# Patient Record
Sex: MALE | Race: WHITE | Employment: OTHER | URBAN - METROPOLITAN AREA
[De-identification: names, ages, dates, MRNs, and addresses within clinical notes are randomized per-mention and may not be internally consistent; named-entity substitution may affect disease eponyms.]

---

## 2021-08-04 LAB
CHOLESTEROL, TOTAL: 107 MG/DL
CHOLESTEROL/HDL RATIO: ABNORMAL
HDLC SERPL-MCNC: 25 MG/DL (ref 35–70)
LDL CHOLESTEROL CALCULATED: 50 MG/DL (ref 0–160)
NONHDLC SERPL-MCNC: 82 MG/DL
TRIGL SERPL-MCNC: 159 MG/DL
VLDLC SERPL CALC-MCNC: ABNORMAL MG/DL

## 2022-01-24 ENCOUNTER — OFFICE VISIT (OUTPATIENT)
Dept: PRIMARY CARE CLINIC | Age: 72
End: 2022-01-24
Payer: COMMERCIAL

## 2022-01-24 VITALS
BODY MASS INDEX: 27.74 KG/M2 | TEMPERATURE: 97.9 F | OXYGEN SATURATION: 95 % | HEIGHT: 78 IN | DIASTOLIC BLOOD PRESSURE: 62 MMHG | RESPIRATION RATE: 18 BRPM | WEIGHT: 239.8 LBS | SYSTOLIC BLOOD PRESSURE: 98 MMHG | HEART RATE: 116 BPM

## 2022-01-24 DIAGNOSIS — M25.511 ACUTE PAIN OF BOTH SHOULDERS: Primary | ICD-10-CM

## 2022-01-24 DIAGNOSIS — E11.9 TYPE 2 DIABETES MELLITUS WITHOUT COMPLICATION, WITHOUT LONG-TERM CURRENT USE OF INSULIN (HCC): ICD-10-CM

## 2022-01-24 DIAGNOSIS — M10.022 ACUTE IDIOPATHIC GOUT OF LEFT ELBOW: ICD-10-CM

## 2022-01-24 DIAGNOSIS — M25.512 ACUTE PAIN OF BOTH SHOULDERS: Primary | ICD-10-CM

## 2022-01-24 PROCEDURE — 36415 COLL VENOUS BLD VENIPUNCTURE: CPT | Performed by: NURSE PRACTITIONER

## 2022-01-24 PROCEDURE — 99203 OFFICE O/P NEW LOW 30 MIN: CPT | Performed by: NURSE PRACTITIONER

## 2022-01-24 RX ORDER — ATORVASTATIN CALCIUM 40 MG/1
40 TABLET, FILM COATED ORAL DAILY
COMMUNITY
Start: 2021-03-15 | End: 2022-02-07 | Stop reason: SDUPTHER

## 2022-01-24 RX ORDER — VIT C/E/ZN/COPPR/LUTEIN/ZEAXAN 60 MG-6 MG
1 CAPSULE ORAL DAILY
COMMUNITY

## 2022-01-24 RX ORDER — METFORMIN HYDROCHLORIDE 500 MG/1
TABLET, EXTENDED RELEASE ORAL
COMMUNITY
Start: 2021-03-15 | End: 2022-02-07 | Stop reason: SDUPTHER

## 2022-01-24 RX ORDER — FERROUS SULFATE 324(65)MG
1 TABLET, DELAYED RELEASE (ENTERIC COATED) ORAL
COMMUNITY

## 2022-01-24 RX ORDER — HYDROCHLOROTHIAZIDE 12.5 MG/1
25 CAPSULE, GELATIN COATED ORAL DAILY
COMMUNITY
Start: 2021-05-19 | End: 2022-02-07 | Stop reason: SDUPTHER

## 2022-01-24 RX ORDER — OMEPRAZOLE 20 MG/1
20 CAPSULE, DELAYED RELEASE ORAL 2 TIMES DAILY
COMMUNITY
Start: 2021-03-15 | End: 2022-02-07 | Stop reason: SDUPTHER

## 2022-01-24 RX ORDER — TAMSULOSIN HYDROCHLORIDE 0.4 MG/1
0.4 CAPSULE ORAL DAILY
COMMUNITY
Start: 2021-03-15 | End: 2022-02-07 | Stop reason: SDUPTHER

## 2022-01-24 RX ORDER — ASPIRIN 81 MG/1
81 TABLET ORAL DAILY
COMMUNITY

## 2022-01-24 RX ORDER — ALLOPURINOL 300 MG/1
300 TABLET ORAL DAILY
COMMUNITY
Start: 2021-10-19 | End: 2022-02-07 | Stop reason: SDUPTHER

## 2022-01-24 SDOH — ECONOMIC STABILITY: FOOD INSECURITY: WITHIN THE PAST 12 MONTHS, YOU WORRIED THAT YOUR FOOD WOULD RUN OUT BEFORE YOU GOT MONEY TO BUY MORE.: NEVER TRUE

## 2022-01-24 SDOH — ECONOMIC STABILITY: FOOD INSECURITY: WITHIN THE PAST 12 MONTHS, THE FOOD YOU BOUGHT JUST DIDN'T LAST AND YOU DIDN'T HAVE MONEY TO GET MORE.: NEVER TRUE

## 2022-01-24 ASSESSMENT — PATIENT HEALTH QUESTIONNAIRE - PHQ9
SUM OF ALL RESPONSES TO PHQ9 QUESTIONS 1 & 2: 0
1. LITTLE INTEREST OR PLEASURE IN DOING THINGS: 0
2. FEELING DOWN, DEPRESSED OR HOPELESS: 0
SUM OF ALL RESPONSES TO PHQ QUESTIONS 1-9: 0

## 2022-01-24 ASSESSMENT — SOCIAL DETERMINANTS OF HEALTH (SDOH): HOW HARD IS IT FOR YOU TO PAY FOR THE VERY BASICS LIKE FOOD, HOUSING, MEDICAL CARE, AND HEATING?: NOT HARD AT ALL

## 2022-01-24 ASSESSMENT — ENCOUNTER SYMPTOMS
WHEEZING: 0
COUGH: 0
SHORTNESS OF BREATH: 0

## 2022-01-24 NOTE — PROGRESS NOTES
PROGRESS NOTE  Date of Service:  1/24/2022  Address: [place] [address] [facility]  [address] [place] [address] [address]  Dept: [phone]  Loc: [phone]    Subjective:      Patient ID: [id number]  Carla Rose is a 66 y.o. 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 Patient is . Patient is having bilateral shoulder pain, patient said that he can get comfortable for a while, he said he will sleep on one then it with hurt him. Patient said it has been going on 18 months. Patient denies injury his shoulders. Patient works on older cars, he said getting out hurts, he said pushing up hurt now pulling hurt. Patient said he has lost a little strength. Patient is taking some aleve, he said he takes when he is trying to help him sleep. Gout: right wrist, patient said he has pain in the left elbow, he was told by ortho. He said he thinks he has gout in ankle. Diabetes: does not check sugars. Patient sugars were checked in september 6.8 a1c    Patient does go to the [place] for his medical care as well, he is getting melanoma excised from his neck, not scheduled. Review of Systems   Constitutional: Negative for chills and fatigue. Respiratory: Negative for cough, shortness of breath and wheezing. Cardiovascular: Negative for chest pain and palpitations. Musculoskeletal:        Bilateral shoulder pain    Psychiatric/Behavioral: The patient is not nervous/anxious. All other systems reviewed and are negative. Objective:   Physical Exam  Vitals reviewed. Constitutional:       Appearance: Normal appearance. Cardiovascular:      Rate and Rhythm: Normal rate and regular rhythm. Pulses: Normal pulses. Heart sounds: Normal heart sounds. Pulmonary:      Effort: Pulmonary effort is normal.      Breath sounds: Normal breath sounds. Abdominal:      General: Abdomen is flat. Palpations: Abdomen is soft. Skin:     Capillary Refill: Capillary refill takes less than 2 seconds. Neurological:      General: No focal deficit present. Mental Status: He is alert and oriented to person, place, and time. Psychiatric:         Mood and Affect: Mood normal.         Behavior: Behavior normal.         Thought Content: Thought content normal.         Judgment: Judgment normal.         Plan:   1. Acute pain of both shoulders-4 polymyalgia rheumatica. Been having pain in both shoulders will get sed rate and CRP if elevated will start patient on steroids discussed in office and get a referral to rheumatology. Patient understands that steroids can affect his blood sugars we will check A1c today. -     Sedimentation Rate  -     C-Reactive Protein  2. Acute idiopathic gout of left elbow-patient has a history of gout has been followed by orthopedic. We will continue to monitor patient will continue allopurinol. Patient's last uric acid was within normal limits. 3. Type 2 diabetes mellitus without complication, without long-term current use of insulin (HCC)-patient's A1c has been well controlled over the years. Patient is on Metformin will continue this with Lipitor.  -     Comprehensive Metabolic Panel  -     Hemoglobin A1C  -     glucose monitoring (FREESTYLE FREEDOM) kit; DAILY Starting Thu 1/27/2022, Disp-1 kit, R-0, Normal             Electronically signed by JOSE Campa CNP on 1/24/22 at 3:12 PM EST     This dictation was generated by voice recognition computer software. Although all attempts are made to edit the dictation for accuracy, there may be errors in the transcription that were not intended.

## 2022-01-25 LAB
A/G RATIO: 1 (ref 1.1–2.2)
ALBUMIN SERPL-MCNC: 3.8 G/DL (ref 3.4–5)
ALP BLD-CCNC: 149 U/L (ref 40–129)
ALT SERPL-CCNC: 22 U/L (ref 10–40)
ANION GAP SERPL CALCULATED.3IONS-SCNC: 14 MMOL/L (ref 3–16)
AST SERPL-CCNC: 21 U/L (ref 15–37)
BILIRUB SERPL-MCNC: 0.5 MG/DL (ref 0–1)
BUN BLDV-MCNC: 19 MG/DL (ref 7–20)
C-REACTIVE PROTEIN: 12.3 MG/L (ref 0–5.1)
CALCIUM SERPL-MCNC: 9.8 MG/DL (ref 8.3–10.6)
CHLORIDE BLD-SCNC: 98 MMOL/L (ref 99–110)
CO2: 22 MMOL/L (ref 21–32)
CREAT SERPL-MCNC: 1.1 MG/DL (ref 0.8–1.3)
ESTIMATED AVERAGE GLUCOSE: 151.3 MG/DL
GFR AFRICAN AMERICAN: >60
GFR NON-AFRICAN AMERICAN: >60
GLUCOSE BLD-MCNC: 117 MG/DL (ref 70–99)
HBA1C MFR BLD: 6.9 %
POTASSIUM SERPL-SCNC: 4.2 MMOL/L (ref 3.5–5.1)
SEDIMENTATION RATE, ERYTHROCYTE: 80 MM/HR (ref 0–20)
SODIUM BLD-SCNC: 134 MMOL/L (ref 136–145)
TOTAL PROTEIN: 7.7 G/DL (ref 6.4–8.2)

## 2022-01-26 DIAGNOSIS — M25.512 ACUTE PAIN OF BOTH SHOULDERS: Primary | ICD-10-CM

## 2022-01-26 DIAGNOSIS — M25.511 ACUTE PAIN OF BOTH SHOULDERS: Primary | ICD-10-CM

## 2022-01-26 DIAGNOSIS — R70.0 ELEVATED SED RATE: Primary | ICD-10-CM

## 2022-01-26 DIAGNOSIS — R70.0 ELEVATED SED RATE: ICD-10-CM

## 2022-01-26 RX ORDER — ALLOPURINOL 300 MG/1
300 TABLET ORAL DAILY
Qty: 30 TABLET | Status: CANCELLED | OUTPATIENT
Start: 2022-01-26

## 2022-01-26 RX ORDER — PREDNISONE 10 MG/1
TABLET ORAL
Qty: 55 TABLET | Refills: 0 | Status: SHIPPED | OUTPATIENT
Start: 2022-01-26 | End: 2022-03-30

## 2022-01-27 RX ORDER — BLOOD-GLUCOSE METER
1 KIT MISCELLANEOUS DAILY
Qty: 1 KIT | Refills: 0 | Status: SHIPPED | OUTPATIENT
Start: 2022-01-27 | End: 2022-07-20

## 2022-01-31 ENCOUNTER — TELEPHONE (OUTPATIENT)
Dept: PRIMARY CARE CLINIC | Age: 72
End: 2022-01-31

## 2022-02-05 ENCOUNTER — PATIENT MESSAGE (OUTPATIENT)
Dept: PRIMARY CARE CLINIC | Age: 72
End: 2022-02-05

## 2022-02-05 DIAGNOSIS — N39.44 NOCTURNAL ENURESIS: ICD-10-CM

## 2022-02-05 DIAGNOSIS — M10.9 GOUT, UNSPECIFIED CAUSE, UNSPECIFIED CHRONICITY, UNSPECIFIED SITE: ICD-10-CM

## 2022-02-05 DIAGNOSIS — E11.9 TYPE 2 DIABETES MELLITUS WITHOUT COMPLICATION, WITHOUT LONG-TERM CURRENT USE OF INSULIN (HCC): Primary | ICD-10-CM

## 2022-02-05 DIAGNOSIS — I10 HYPERTENSION, UNSPECIFIED TYPE: ICD-10-CM

## 2022-02-05 DIAGNOSIS — K21.9 GASTROESOPHAGEAL REFLUX DISEASE, UNSPECIFIED WHETHER ESOPHAGITIS PRESENT: ICD-10-CM

## 2022-02-07 DIAGNOSIS — E11.9 TYPE 2 DIABETES MELLITUS WITHOUT COMPLICATION, WITHOUT LONG-TERM CURRENT USE OF INSULIN (HCC): ICD-10-CM

## 2022-02-07 RX ORDER — ALLOPURINOL 300 MG/1
300 TABLET ORAL DAILY
Qty: 90 TABLET | Refills: 1 | Status: SHIPPED | OUTPATIENT
Start: 2022-02-07 | End: 2022-04-13 | Stop reason: SDUPTHER

## 2022-02-07 RX ORDER — HYDROCHLOROTHIAZIDE 12.5 MG/1
25 CAPSULE, GELATIN COATED ORAL DAILY
Qty: 90 CAPSULE | Refills: 1 | Status: SHIPPED | OUTPATIENT
Start: 2022-02-07 | End: 2022-07-26 | Stop reason: SDUPTHER

## 2022-02-07 RX ORDER — OMEPRAZOLE 20 MG/1
20 CAPSULE, DELAYED RELEASE ORAL 2 TIMES DAILY
Qty: 90 CAPSULE | Refills: 1 | Status: SHIPPED | OUTPATIENT
Start: 2022-02-07 | End: 2022-07-26 | Stop reason: SDUPTHER

## 2022-02-07 RX ORDER — ATORVASTATIN CALCIUM 40 MG/1
40 TABLET, FILM COATED ORAL DAILY
Qty: 90 TABLET | Refills: 1 | Status: SHIPPED | OUTPATIENT
Start: 2022-02-07 | End: 2022-08-16 | Stop reason: SDUPTHER

## 2022-02-07 RX ORDER — BLOOD-GLUCOSE METER
1 KIT MISCELLANEOUS DAILY
Qty: 1 KIT | Refills: 0 | Status: CANCELLED | OUTPATIENT
Start: 2022-02-07

## 2022-02-07 RX ORDER — METFORMIN HYDROCHLORIDE 500 MG/1
TABLET, EXTENDED RELEASE ORAL
Qty: 90 TABLET | Refills: 1 | Status: SHIPPED | OUTPATIENT
Start: 2022-02-07 | End: 2022-04-29 | Stop reason: SDUPTHER

## 2022-02-07 RX ORDER — LANCETS 30 GAUGE
1 EACH MISCELLANEOUS DAILY
Qty: 300 EACH | Refills: 1 | Status: SHIPPED | OUTPATIENT
Start: 2022-02-07 | End: 2022-02-09 | Stop reason: SDUPTHER

## 2022-02-07 RX ORDER — TAMSULOSIN HYDROCHLORIDE 0.4 MG/1
0.4 CAPSULE ORAL DAILY
Qty: 90 CAPSULE | Refills: 1 | Status: SHIPPED | OUTPATIENT
Start: 2022-02-07 | End: 2022-08-16 | Stop reason: SDUPTHER

## 2022-02-07 NOTE — TELEPHONE ENCOUNTER
Spoke to patient, requested all Rx's to [facility] order pharmacy. Orders loaded, please review and also review diagnosis prior to sending to pharmacy. Medication:   Requested Prescriptions     Pending Prescriptions Disp Refills    Blood Glucose Monitoring Suppl KIT 1 kit 0     Si kit by Does not apply route daily Dispense according to insurance formulary    blood glucose test strips (ASCENSIA AUTODISC VI;ONE TOUCH ULTRA TEST VI) strip 300 each 1     Si each by In Vitro route daily Test as directed,Dispense according to insurance formulary    Lancets MISC 300 each 1     Si each by Does not apply route daily Test as directed, Dispense according to insurance formulary    allopurinol (ZYLOPRIM) 300 MG tablet 90 tablet 1     Sig: Take 1 tablet by mouth daily    atorvastatin (LIPITOR) 40 MG tablet 90 tablet 1     Sig: Take 1 tablet by mouth daily    hydroCHLOROthiazide (MICROZIDE) 12.5 MG capsule 90 capsule 1     Sig: Take 2 capsules by mouth daily    metFORMIN (GLUCOPHAGE-XR) 500 MG extended release tablet 90 tablet 1     Sig: TAKE 1 TABLET EVERY NIGHT    omeprazole (PRILOSEC) 20 MG delayed release capsule 90 capsule 1     Sig: Take 1 capsule by mouth 2 times daily    tamsulosin (FLOMAX) 0.4 MG capsule 90 capsule 1     Sig: Take 1 capsule by mouth daily        Last Filled:      Patient Phone Number: [phone] (home)     Last appt: 2022   Next appt: 2022    Last OARRS: No flowsheet data found.

## 2022-02-08 DIAGNOSIS — E11.9 TYPE 2 DIABETES MELLITUS WITHOUT COMPLICATION, WITHOUT LONG-TERM CURRENT USE OF INSULIN (HCC): ICD-10-CM

## 2022-02-09 RX ORDER — LANCETS 30 GAUGE
1 EACH MISCELLANEOUS DAILY
Qty: 300 EACH | Refills: 1 | Status: SHIPPED | OUTPATIENT
Start: 2022-02-09

## 2022-03-26 NOTE — PROGRESS NOTES
Trevin Vasquez MD  [facility] Physicians - Rheumatology    [x] [facility]:  [facility]  [address] [facility] [] [address]:  [address], [address]   Office: [phone]  Fax: [phone]     RHEUMATOLOGY CONSULT NOTE    ASSESSMENT/PLAN:  Daniel Gomez is a 66 y.o. male referred by JOSE Langston - CNP for acute pain of both shoulders and elevated ESR. PMHx includes recent Hx of melanoma (on neck and trunk s/p resection, per pt localized) and T2DM. Current rheum meds:  Aleve PRN  Allopurinol 300 mg daily  Vitamin D3 2000 IU daily    Past rheum meds:  Prednisone taper starting w/ 50 mg daily: prescribed by PCP on 1/26/22, \"took away all my pains\"     1. Polyarthralgia  Assessment & Plan:  - chronic inflammatory polyarthritis involving the b/l MCP, PIP, wrist and likely MTP and ankle joints. He had significant synovitis in his wrist and MCP joints on exam w/ limited ROM in his wrist joints. Clinical presentation not c/w gout, I doubt he had gout. DDx includes RA vs CPPD arthropathy, favor RA given findings of possible rheumatoid nodule on L elbow and lung nodule on prior PET CT.  - obtain rheum w/u to evaluate for inflammatory arthritis, specifically RA vs CPPD arthropathy. - switch Aleve to Naproxen 500 mg BID PRN. - short term f/u, pending Rheum w/u. Orders:  -     C-Reactive Protein; Future  -     Sedimentation Rate; Future  -     Hepatitis B Core Antibody, Total; Future  -     Hepatitis C Antibody; Future  -     Hepatitis B Surface Antigen; Future  -     Cyclic Citrul Peptide Antibody, IgG; Future  -     Rheumatoid Factor; Future  -     REEMA Reflex to Antibody Cascade; Future  -     C3 Complement; Future  -     C4 Complement; Future  -     Vitamin D 25 Hydroxy; Future  -     XR HAND LEFT (MIN 3 VIEWS); Future  -     XR HAND RIGHT (MIN 3 VIEWS); Future  -     XR FOOT RIGHT (MIN 3 VIEWS); Future  -     XR FOOT LEFT (MIN 3 VIEWS);  Future  - naproxen (NAPROSYN) 500 MG tablet; Take 1 tablet by mouth 2 times daily as needed for Pain, Disp-60 tablet, R-1Normal  -     Lysozyme, Serum; Future  -     Angiotensin Converting Enzyme; Future  -     Vitamin D 1,25 Dihydroxy; Future  2. Idiopathic gout, unspecified chronicity, unspecified site  Assessment & Plan:  - pt reported Hx of non-crystal proven gout treated w/ Allopurinol 300 mg daily. Clinical presentation not c/w gout, I doubt he had gout. - most recent uric acid on 8/4/21 not at goal. Would hold off on titrating Allopurinol dose, pending rheum w/u for inflammatory arthritis including X-rays to evaluate for gouty erosions. Orders:  -     C-Reactive Protein; Future  -     Uric Acid; Future  -     Sedimentation Rate; Future  -     XR HAND LEFT (MIN 3 VIEWS); Future  -     XR HAND RIGHT (MIN 3 VIEWS); Future  -     XR FOOT RIGHT (MIN 3 VIEWS); Future  -     XR FOOT LEFT (MIN 3 VIEWS); Future  3. Encounter for therapeutic drug monitoring  -     CBC with Auto Differential; Future  -     Hepatic Function Panel; Future  -     Renal Panel; Future     Return in about 4 weeks (around 4/27/2022) for lab result discussion and treatment plan, medication monitoring. 3/30/2022 10:58 AM      The risks and benefits of my recommendations, as well as other treatment options, benefits and side effects were discussed w/ the pt today. Questions were answered. NOTE: This report is transcribed by using voice recognition software dragon. Every effort is made to ensure accuracy; however, inadvertent computerized transcription errors may be present. Consult note will be sent to the referring provider. Thank you very much for allowing me to participate in this pt's care. Please do not hesitate to contact me if I can be of further assistance. HISTORY OF PRESENT ILLNESS:      Pt reports a 4 yr Hx of polyarthralgias in his b/l MCP joints, wrists, elbows and most recently his ankles. Arthralgias are worst in the morning. ([facility]     GERD (gastroesophageal reflux disease)     Gout     Hyperlipidemia     Osteoarthritis         No past surgical history on file. Social History     Socioeconomic History    Marital status:      Spouse name: Not on file    Number of children: Not on file    Years of education: Not on file    Highest education level: Not on file   Occupational History    Not on file   Tobacco Use    Smoking status: Former Smoker     Packs/day: 1.00     Years: 15.00     Pack years: 15.00     Start date: 1968     Quit date: 2000     Years since quittin.1    Smokeless tobacco: Never Used   Vaping Use    Vaping Use: Never used   Substance and Sexual Activity    Alcohol use: Not Currently    Drug use: Not Currently    Sexual activity: Not Currently   Other Topics Concern    Not on file   Social History Narrative    Not on file     Social Determinants of Health     Financial Resource Strain: Low Risk     Difficulty of Paying Living Expenses: Not hard at all   Food Insecurity: No Food Insecurity    Worried About Running Out of Food in the Last Year: Never true    Lauren of Food in the Last Year: Never true   Transportation Needs:     Lack of Transportation (Medical): Not on file    Lack of Transportation (Non-Medical):  Not on file   Physical Activity:     Days of Exercise per Week: Not on file    Minutes of Exercise per Session: Not on file   Stress:     Feeling of Stress : Not on file   Social Connections:     Frequency of Communication with Friends and Family: Not on file    Frequency of Social Gatherings with Friends and Family: Not on file    Attends Confucianist Services: Not on file    Active Member of Clubs or Organizations: Not on file    Attends Club or Organization Meetings: Not on file    Marital Status: Not on file   Intimate Partner Violence:     Fear of Current or Ex-Partner: Not on file    Emotionally Abused: Not on file    Physically Abused: Not on file   [facility] Sexually Abused: Not on file   Housing Stability:     Unable to Pay for Housing in the Last Year: Not on file    Number of Places Lived in the Last Year: Not on file    Unstable Housing in the Last Year: Not on file        No family history on file.     MEDICATIONS:    Current Outpatient Medications:     naproxen (NAPROSYN) 500 MG tablet, Take 1 tablet by mouth 2 times daily as needed for Pain, Disp: 60 tablet, Rfl: 1    Lancets MISC, 1 each by Does not apply route daily Test as directed, Dispense according to insurance formulary, Disp: 300 each, Rfl: 1    Blood Glucose Monitoring Suppl KIT, 1 kit by Does not apply route daily Dispense according to insurance formulary, Disp: 1 kit, Rfl: 0    blood glucose test strips (ASCENSIA AUTODISC VI;ONE TOUCH ULTRA TEST VI) strip, 1 each by In Vitro route daily Test as directed,Dispense according to insurance formulary, Disp: 300 each, Rfl: 1    allopurinol (ZYLOPRIM) 300 MG tablet, Take 1 tablet by mouth daily, Disp: 90 tablet, Rfl: 1    atorvastatin (LIPITOR) 40 MG tablet, Take 1 tablet by mouth daily, Disp: 90 tablet, Rfl: 1    hydroCHLOROthiazide (MICROZIDE) 12.5 MG capsule, Take 2 capsules by mouth daily, Disp: 90 capsule, Rfl: 1    metFORMIN (GLUCOPHAGE-XR) 500 MG extended release tablet, TAKE 1 TABLET EVERY NIGHT, Disp: 90 tablet, Rfl: 1    omeprazole (PRILOSEC) 20 MG delayed release capsule, Take 1 capsule by mouth 2 times daily, Disp: 90 capsule, Rfl: 1    tamsulosin (FLOMAX) 0.4 MG capsule, Take 1 capsule by mouth daily, Disp: 90 capsule, Rfl: 1    glucose monitoring (FREESTYLE FREEDOM) kit, 1 kit by Does not apply route daily, Disp: 1 kit, Rfl: 0    aspirin 81 MG EC tablet, Take 81 mg by mouth daily, Disp: , Rfl:     Cholecalciferol 50 MCG (2000 UT) TABS, Take 2,000 Units by mouth daily, Disp: , Rfl:     Ferrous Sulfate 324 MG TBEC, Take 1 tablet by mouth daily (with breakfast), Disp: , Rfl:     Krill Oil 300 MG CAPS, Take 300 mg by mouth nightly, Disp: , Rfl:     Multiple Vitamins-Minerals (OCUVITE-LUTEIN) CAPS multivitamin, Take 1 tablet by mouth daily, Disp: , Rfl:     ALLERGIES:  Patient has no known allergies. PHYSICAL EXAM:    Vitals:    /76   Pulse 90   Ht 6' 3\" (1.905 m)   Wt 241 lb (109.3 kg)   SpO2 96%   BMI 30.12 kg/m²     GEN: AAOx3, in NAD, well-appearing  HEAD: normocephalic, atraumatic  EYES: no injection or icterus  CVS: RRR  LUNGS: in no acute respiratory distress, CTAB  MSK:  Spine: no paraspinal muscle or vertebral tenderness, SI joints NTTP  Upper extremities:   Hands: b/l MCP joints w/ synovial proliferation and synovitis TTP, b/l PIP joints w/ bony enlargement and synovitis TTP, b/l DIP joints w/o significant swelling slightly TTP, full fist formation w/ weak  strength, no evidence of sclerodactyly, calcinosis, digital ulcers or pitting   Wrist: b/l wrist joints w/ synovial proliferation and significant synovitis TTP, limited ROM   Elbow: no synovitis or bursitis, small soft tissue nodule in lateral aspect of the L elbow, FROM   Shoulders: no pain or swelling or warmth on palpation, FROM  Lower extremities:   Knees: no warmth or effusion present, FROM   Ankles: no synovitis, FROM, Achilles tendons w/o swelling or warmth NTTP   Feet: no toe swelling or pain or warmth on palpation w/ FROM, +MTP squeeze test b/l  INTEGUMENT: no rash or psoriatic lesions, no petechiae, bruises, or palpable purpura, no patchy alopecia, no nail pitting or periungual changes, no clubbing or digital ulcers    Labs:   I personally reviewed prior labs including:    Lab Results   Component Value Date     (L) 01/24/2022    K 4.2 01/24/2022    CL 98 (L) 01/24/2022    CO2 22 01/24/2022    BUN 19 01/24/2022    CREATININE 1.1 01/24/2022    GLUCOSE 117 (H) 01/24/2022    CALCIUM 9.8 01/24/2022    PROT 7.7 01/24/2022    LABALBU 3.8 01/24/2022    BILITOT 0.5 01/24/2022    ALKPHOS 149 (H) 01/24/2022    AST 21 01/24/2022    ALT 22 01/24/2022    LABGLOM >60 01/24/2022    GFRAA >60 01/24/2022    AGRATIO 1.0 (L) 01/24/2022     Lab Results   Component Value Date    CRP 12.3 (H) 01/24/2022     Lab Results   Component Value Date    SEDRATE 80 (H) 01/24/2022     Urci acid 7.2 (3/25/20) --> 7.9 (8/7/20) --> 5.5 (2/4/21) --> 6.1 (8/4/21)    Radiology:  I personally reviewed prior imaging including:    PET CT (4/5/21): FINDINGS:   HEAD/NECK:   There is a focal hypermetabolic (maximum SUV 98.7) soft tissue lesion measuring 3 x 1.8 cm (series 3 image 72) surrounding the left aspect of the hyoid bone and extending into the left thyroid cartilage and pre-epiglottic fat. There are multiple small hypermetabolic lymph nodes in the bilateral lower neck. CHEST:   There are mildly enlarged bilateral axillary lymph nodes with preserved fatty fransisco, with the left axillary lymph node measuring a maximum SUV of 5.3. Moderate upper lung zone predominant centrilobular and paraseptal emphysema. Bandlike opacities at the lung bases, likely subsegmental atelectasis/scarring. No abnormal FDG uptake in the lungs, including in the 1.4 cm left upper lobe nodule. Mild coronary artery calcifications. ABDOMEN/PELVIS:   Mild cortical renal scarring. Scattered atherosclerotic calcifications throughout the aorta and its branches. Prostatomegaly. There are mildly hypermetabolic bilateral inguinal lymph nodes with preserved fatty fransisco. BONES/SOFT TISSUES:   There is symmetric increased FDG uptake in the bilateral glenohumeral joints, elbow joints, wrists, and hips, and at C1/dens, likely related to arthritis. The well circumscribed lucent lesion in the right humeral head does not demonstrate increased FDG uptake and is likely a benign lesion. No abnormal FDG uptake in the skeleton. IMPRESSION:   1. 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    2.  Multiple small lymph nodes in the lower neck and mildly enlarged bilateral axillary lymph nodes are indeterminate. While they demonstrate increased FDG uptake, no suspicious morphological features are seen and they may be reactive or inflammatory. Recommend continued attention on follow-up or comparison with outside prior imaging. 3.  No abnormal FDG uptake in the lungs, including in the left upper lobe nodule. Consider continued chest CT follow up imaging. 4.  Polyarticular arthritis with FDG uptake, which may be inflammatory. Above results were discussed w/ the pt in detail during today's visit.

## 2022-03-30 ENCOUNTER — HOSPITAL ENCOUNTER (OUTPATIENT)
Dept: GENERAL RADIOLOGY | Age: 72
Discharge: HOME | End: 2022-03-30
Payer: COMMERCIAL

## 2022-03-30 ENCOUNTER — OFFICE VISIT (OUTPATIENT)
Dept: RHEUMATOLOGY | Age: 72
End: 2022-03-30
Payer: COMMERCIAL

## 2022-03-30 VITALS
DIASTOLIC BLOOD PRESSURE: 76 MMHG | BODY MASS INDEX: 29.97 KG/M2 | OXYGEN SATURATION: 96 % | SYSTOLIC BLOOD PRESSURE: 116 MMHG | HEIGHT: 75 IN | HEART RATE: 90 BPM | WEIGHT: 241 LBS

## 2022-03-30 DIAGNOSIS — M25.50 POLYARTHRALGIA: ICD-10-CM

## 2022-03-30 DIAGNOSIS — M10.00 IDIOPATHIC GOUT, UNSPECIFIED CHRONICITY, UNSPECIFIED SITE: ICD-10-CM

## 2022-03-30 DIAGNOSIS — M25.50 POLYARTHRALGIA: Primary | ICD-10-CM

## 2022-03-30 DIAGNOSIS — Z51.81 ENCOUNTER FOR THERAPEUTIC DRUG MONITORING: ICD-10-CM

## 2022-03-30 LAB
ALBUMIN SERPL-MCNC: 3.8 G/DL (ref 3.4–5)
ALP BLD-CCNC: 153 U/L (ref 40–129)
ALT SERPL-CCNC: 18 U/L (ref 10–40)
ANION GAP SERPL CALCULATED.3IONS-SCNC: 14 MMOL/L (ref 3–16)
AST SERPL-CCNC: 17 U/L (ref 15–37)
BASOPHILS ABSOLUTE: 0 K/UL (ref 0–0.2)
BASOPHILS RELATIVE PERCENT: 0.5 %
BILIRUB SERPL-MCNC: 0.6 MG/DL (ref 0–1)
BILIRUBIN DIRECT: <0.2 MG/DL (ref 0–0.3)
BILIRUBIN, INDIRECT: ABNORMAL MG/DL (ref 0–1)
BUN BLDV-MCNC: 16 MG/DL (ref 7–20)
C-REACTIVE PROTEIN: <3 MG/L (ref 0–5.1)
C3 COMPLEMENT: 148.2 MG/DL (ref 90–180)
C4 COMPLEMENT: 33.2 MG/DL (ref 10–40)
CALCIUM SERPL-MCNC: 9.2 MG/DL (ref 8.3–10.6)
CHLORIDE BLD-SCNC: 102 MMOL/L (ref 99–110)
CO2: 23 MMOL/L (ref 21–32)
CREAT SERPL-MCNC: 1.2 MG/DL (ref 0.8–1.3)
EOSINOPHILS ABSOLUTE: 0.4 K/UL (ref 0–0.6)
EOSINOPHILS RELATIVE PERCENT: 5.6 %
GFR AFRICAN AMERICAN: >60
GFR NON-AFRICAN AMERICAN: 59
GLUCOSE BLD-MCNC: 126 MG/DL (ref 70–99)
HCT VFR BLD CALC: 40 % (ref 40.5–52.5)
HEMOGLOBIN: 13.7 G/DL (ref 13.5–17.5)
HEPATITIS B SURFACE ANTIGEN INTERPRETATION: NORMAL
HEPATITIS C ANTIBODY INTERPRETATION: NORMAL
LYMPHOCYTES ABSOLUTE: 1.8 K/UL (ref 1–5.1)
LYMPHOCYTES RELATIVE PERCENT: 25.8 %
MCH RBC QN AUTO: 31 PG (ref 26–34)
MCHC RBC AUTO-ENTMCNC: 34.2 G/DL (ref 31–36)
MCV RBC AUTO: 90.6 FL (ref 80–100)
MONOCYTES ABSOLUTE: 0.6 K/UL (ref 0–1.3)
MONOCYTES RELATIVE PERCENT: 9.1 %
NEUTROPHILS ABSOLUTE: 4.1 K/UL (ref 1.7–7.7)
NEUTROPHILS RELATIVE PERCENT: 59 %
PDW BLD-RTO: 15.8 % (ref 12.4–15.4)
PHOSPHORUS: 3.2 MG/DL (ref 2.5–4.9)
PLATELET # BLD: 168 K/UL (ref 135–450)
PMV BLD AUTO: 8.5 FL (ref 5–10.5)
POTASSIUM SERPL-SCNC: 4.1 MMOL/L (ref 3.5–5.1)
RBC # BLD: 4.42 M/UL (ref 4.2–5.9)
RHEUMATOID FACTOR: 15 IU/ML
SEDIMENTATION RATE, ERYTHROCYTE: 40 MM/HR (ref 0–20)
SODIUM BLD-SCNC: 139 MMOL/L (ref 136–145)
TOTAL PROTEIN: 6.5 G/DL (ref 6.4–8.2)
URIC ACID, SERUM: 6.3 MG/DL (ref 3.5–7.2)
VITAMIN D 25-HYDROXY: 38.5 NG/ML
WBC # BLD: 6.9 K/UL (ref 4–11)

## 2022-03-30 PROCEDURE — 73130 X-RAY EXAM OF HAND: CPT

## 2022-03-30 PROCEDURE — 99204 OFFICE O/P NEW MOD 45 MIN: CPT | Performed by: INTERNAL MEDICINE

## 2022-03-30 PROCEDURE — 73630 X-RAY EXAM OF FOOT: CPT

## 2022-03-30 RX ORDER — NAPROXEN 500 MG/1
500 TABLET ORAL 2 TIMES DAILY PRN
Qty: 60 TABLET | Refills: 1 | Status: SHIPPED
Start: 2022-03-30 | End: 2022-04-13 | Stop reason: SINTOL

## 2022-03-30 NOTE — ASSESSMENT & PLAN NOTE
- chronic inflammatory polyarthritis involving the b/l MCP, PIP, wrist and likely MTP and ankle joints. He had significant synovitis in his wrist and MCP joints on exam w/ limited ROM in his wrist joints. Clinical presentation not c/w gout, I doubt he had gout. DDx includes RA vs CPPD arthropathy, favor RA given findings of possible rheumatoid nodule on L elbow and lung nodule on prior PET CT.  - obtain rheum w/u to evaluate for inflammatory arthritis, specifically RA vs CPPD arthropathy. - switch Aleve to Naproxen 500 mg BID PRN. - short term f/u, pending Rheum w/u.

## 2022-03-30 NOTE — LETTER
[facility]  [address] [address]  Phone: [phone]  Fax: [phone]    Petty Sterling MD    March 30, 2022     Fabiano Mary, APRN - [address] [facility] [address]    Patient: Tena Mott   MR Number: [id number]   YOB: 1950   Date of Visit: 3/30/2022       Dear Fabiano Mary: Thank you for referring Tena Mott to me for evaluation/treatment. Below are the relevant portions of my assessment and plan of care. If you have questions, please do not hesitate to call me. I look forward to following Marilee Pickett along with you.     Sincerely,      Petty Sterling MD

## 2022-03-30 NOTE — ASSESSMENT & PLAN NOTE
- pt reported Hx of non-crystal proven gout treated w/ Allopurinol 300 mg daily. Clinical presentation not c/w gout, I doubt he had gout. - most recent uric acid on 8/4/21 not at goal. Would hold off on titrating Allopurinol dose, pending rheum w/u for inflammatory arthritis including X-rays to evaluate for gouty erosions.

## 2022-03-30 NOTE — PATIENT INSTRUCTIONS
After your visit with Dr. Niyah Vazquez today, please obtain all labs and imaging as ordered prior to your 2 week follow up visit. Please make sure to obtain all X-rays at a [facility]) facility as Dr. Niyah Vazquez personally reviews the films to make the proper diagnosis for you. If you are referred to another doctor, make sure to call the provided number to schedule an appointment for yourself. If you dont hear anything from that doctors office after a few business days, please give our office a call so we help you or reach out to them on your own if you can find their contact information    Please note:   Lab and imaging results will be discussed at follow up appointments (not over the phone or via [address] If you would like a copy of your labs before your follow up appointment, you can call the office and request for them to be mailed to you. Take pictures on your phone! Many manifestations of rheumatic disease are transient. If you take a picture of your bothersome physical finding (rash, swollen joint, ulcer, etc), we will have more information at your next appointment. Prescriptions and refills will be handled at scheduled office visits and enough medicine will be prescribed to last at least until the patients next appointment. Since we expect to fill prescriptions at the office visit, running out may be a signal that it is time to call our office to schedule an appointment. 90-day refills will be provided at [address] [place] [address] as most of our medications are high risk medications that require toxicity monitoring. It is your responsibility to schedule your follow up appointment on time to ensure that you have enough refills in between office visits. Patients with rheumatic disease are more susceptible to a variety of infections, whether or not they are on medicine to suppress their immune system.  Please discuss vaccinations with your primary care doctor,  including but not limited to:  o Influenza vaccination (yearly)  o Pneumococcal vaccinations (Prevnar 13, Pneumovax)  o Shingles vaccination (Shingrix)  o HPV vaccines (SLE patients have a higher rate of HPV infection and precancerous cervical lesions)  o TDaP vaccination  o Hepatitis B vaccination    Patients with rheumatic disease are at a higher risk for a variety of cancers. Please make sure you discuss age appropriate screening with your primary care doctor, including but not limited to:  o Breast, cervical, colon, and prostate cancer screenings  o Yearly full body skin exams with a dermatologist    Patients with rheumatic disease are at a higher risk of osteoporosis and osteopenia (fragile bones, reduced bone density, increased risk for fractures). Please discuss your need for a bone density test (DEXA scan) with your primary care doctor or Dr. Nico Acuna. Knowledge is power. There is a wealth of free patient information available on www. [email or web address]. Some articles require a subscription, so if you are unable to access an article, please let Dr. Nico Acuna know and she can print it or e-mail the article to you. We encourage you to read as much as you can about your diagnosis and the recommended medications.

## 2022-03-31 LAB
ANTI-CENTROMERE B IGG: <0.2 AI (ref 0–0.9)
ANTI-CHROMATIN IGG: <0.2 AI (ref 0–0.9)
ANTI-DSDNA IGG: 3 IU/ML (ref 0–9)
ANTI-JO1 IGG: <0.2 AI (ref 0–0.9)
ANTI-NUCLEAR ANTIBODY (ANA): POSITIVE
ANTI-RIBOSOMAL P IGG: <0.2 AI (ref 0–0.9)
ANTI-RNP IGG: 1.7 AI (ref 0–0.9)
ANTI-SCL70 IGG: 0.2 AI (ref 0–0.9)
ANTI-SMITH IGG: <0.2 AI (ref 0–0.9)
ANTI-SMRNP IGG: <0.2 AI (ref 0–0.9)
ANTI-SS-A IGG: 6 AI (ref 0–0.9)
ANTI-SS-B IGG: <0.2 AI (ref 0–0.9)
CYCLIC CITRULLINATED PEPTIDE ANTIBODY IGG: >300 U/ML (ref 0–2.9)

## 2022-04-01 LAB
ANGIOTENSIN CONVERTING ENZYME: 83 U/L (ref 9–67)
HEPATITIS B CORE TOTAL ANTIBODY: NEGATIVE
VITAMIN D 1,25-DIHYDROXY: 33.6 PG/ML (ref 19.9–79.3)

## 2022-04-01 NOTE — RESULT ENCOUNTER NOTE
He tested strongly positive for rheumatoid arthritis. We had put him on our cancellation list, please offer 8 AM appt next Wednesday, 4/6/22.

## 2022-04-07 LAB
ANA PATTERN: ABNORMAL
ANA TITER: ABNORMAL
ANTINUCLEAR AB INTERPRETIVE COMMENT: ABNORMAL
ANTINUCLEAR ANTIBODY, HEP-2, IGG: DETECTED
CYTOPLASM PATTERN: ABNORMAL
CYTOPLASMIC PATTERN TITER: ABNORMAL

## 2022-04-09 NOTE — PROGRESS NOTES
Robert Duncan MD  [facility] ([facility]) Physicians - Rheumatology    [x] [facility]:  [facility]  [address] [facility] [] [place] [address]:  [address], [address]   Office: [phone]  Fax: [phone]     RHEUMATOLOGY PROGRESS NOTE    ASSESSMENT/PLAN:  Beryle Hamburger is a 66 y.o. male w/ newly diagnosed seropositive RA (RF 15.0, CCP >300) and gout. PMHx includes recent Hx of melanoma (on neck and trunk s/p resection, per pt localized) and T2DM. Current rheum meds:  Naproxen 500 mg BID PRN  Allopurinol 300 mg daily  Vitamin D3 2000 IU daily     Past rheum meds:  Prednisone taper starting w/ 50 mg daily: prescribed by PCP on 1/26/22, \"took away all my pains\"  Aleve PRN    1. Seropositive rheumatoid arthritis of multiple joints (HCC)  Assessment & Plan:  - discussed the disease course of his high titer CCP positive RA which portends severe, progressive disease. He had significant synovitis in his wrist and MCP joints on exam w/ limited ROM in his wrist joints. - start  mg BID and MTX 5 tabs/wk. - start Pred taper starting w/ 20 mg daily  - stop Naproxen/NSAIDs d/t mild NELLY and ineffectiveness. - check baseline CXR for new seropositive RA Dx, need to screen for ILD. Need to f/u on L lung nodule seen on prior PET CT study. Orders:  -     methotrexate (RHEUMATREX) 2.5 MG chemo tablet; Take 5 tablets by mouth once a week, Disp-60 tablet, R-0, DAWNormal  -     folic acid (FOLVITE) 1 MG tablet; Take 1 tablet by mouth daily, Disp-90 tablet, R-0Normal  -     hydroxychloroquine (PLAQUENIL) 200 MG tablet; Take 1 tablet by mouth 2 times daily, Disp-180 tablet, R-0Normal  -     C-Reactive Protein; Future  -     predniSONE (DELTASONE) 10 MG tablet; Take 2 tablets by mouth every morning for 10 days, THEN 1 tablet every morning for 10 days, THEN 0.5 tablets every morning for 10 days. , Disp-35 tablet, R-0Normal  -     XR CHEST STANDARD (2 VW); Future  2.  Idiopathic gout, unspecified chronicity, unspecified site  Assessment & Plan:  - he has radiographic evidence of gouty erosion, specifically at his R 1st MCP and L 1st MTP joints. Pt denied Hx of podagra or kidney stones. No evidence of tophi on exam.  - uric acid not at goal of <6.0.   - he will increase Allopurinol dose from 300 mg to 400 mg daily. Orders:  -     allopurinol (ZYLOPRIM) 100 MG tablet; Take 4 tablets by mouth daily, Disp-360 tablet, R-0Normal  -     C-Reactive Protein; Future  -     Uric Acid; Future  -     predniSONE (DELTASONE) 10 MG tablet; Take 2 tablets by mouth every morning for 10 days, THEN 1 tablet every morning for 10 days, THEN 0.5 tablets every morning for 10 days. , Disp-35 tablet, R-0Normal  3. Nodule of upper lobe of left lung  Assessment & Plan:  - seen on PET CT study from 4/5/21.  - he has known melanoma and newly diagnosed high titer CCP positive RA.  - check CXR. Orders:  -     XR CHEST STANDARD (2 VW); Future  4. High risk medication use  Assessment & Plan:  - the risk, benefits and side effects were fully explained to my best knowledge, including but not limited to skin rash, GI side effects, visual blurring, and the risk of retinal toxicity and need to have yearly eye/retinal exam. I also provided pt a detailed hand out on HCQ information. Pt verbalized understanding.  - d/w pt that MTX increases the risk of infections, birth defects, liver toxicity, rare lung problems, probable malignancy and bone marrow toxicity. Discussed need to check safety labs 4 weeks after starting MTX followed by labs every 3 months once we reach a steady dose. Strongly recommended alcohol abstinence. - the risks, benefits, and alternatives to the use of Prednisone were discussed w/ the pt.  Side effects included, but were not limited development of hypertension, hyperglycemia or diabetes mellitus, peptic ulcer disease, cataracts and glaucoma, osteoporosis and risk for fractures, weight gain, cushingoid appearance, avascular necrosis, mood changes including depression and euphoria, the development of a decreased ability to fight off infections. Rare side effects like AVN are seen with long term use of steroids as also, are complications associated with withdrawing from the drug abruptly. Orders:  -     Hepatic Function Panel; Future  -     CBC with Auto Differential; Future  -     Creatinine; Future     Return in about 8 weeks (around 6/8/2022) for lab result discussion and treatment plan, medication monitoring. TIME SPENT TODAY:  I spent over 40 minutes of face-to-face time with the pt (including taking interval history and performing physical exam, review of medical records, independently interpreting results and communicating results to the pt/family/caregiver, counseling and educating the pt/family/caregiver, implementation of treatment plan, coordination of care, documenting clinical information in the EMR) during today visit. At least 50% of this time was spent in counseling, explanation of diagnosis, planning of further management, and coordination of care. The risks and benefits of my recommendations, as well as other treatment options, benefits and side effects were discussed with the patient today. Questions were answered. NOTE: This report is transcribed by using voice recognition software dragon. Every effort is made to ensure accuracy; however, inadvertent computerized  transcription errors may be present. SUBJECTIVE:  Past medical/surgical history, medications and allergies are reviewed and updated as appropriate. Interval Hx:   Pt reports persistent polyarthralgias in his hands, wrists and ankle joints. He reports swelling of his fingers and wrists and prolonged morning stiffness. He feels Naproxen is no more effective than Aleve. He denies recent gout attacks or kidney stones. He reports compliance w/ Allopurinol 300 mg daily.     ROS:  Constitutional: denies chronic fatigue, fever/chills, night sweats, unintentional weight loss  Integumentary: denies photosensitivity, rash, patchy alopecia, or Sx of Raynaud's phenomenon  Eyes: denies dry eyes, redness or pain, visual disturbance, or floaters  Nose: denies nasal ulcers or recurrent sinusitis  Oral cavity: +chronic dry mouth, denies oral ulcers  Cardiovascular: denies CP, palpitations, Hx of pericardial effusion or pericarditis  Respiratory: denies SOB, cough, hemoptysis, or pleurisy  Gastrointestinal: denies heart burn, dysphagia or esophageal dysmotility, denies change in bowel habits or Sx of IBD  Hematologic/Lymphatic: denies abnormal bruising or bleeding, denies Hx of blood clots, denies swollen LNs  Musculoskeletal:  refer to above HPI   Neurological: denies focal weakness, paresthesias/hyperesthesias or change in sensation, denies Hx of seizure, denies change in gait, balance, or memory    No Known Allergies    Past Medical History:        Diagnosis Date    Anemia     BPH (benign prostatic hyperplasia)     Diabetes mellitus (HCC)     GERD (gastroesophageal reflux disease)     Gout     Hyperlipidemia     Osteoarthritis        Past Surgical History:    No past surgical history on file. Medications:    Current Outpatient Medications   Medication Sig Dispense Refill    allopurinol (ZYLOPRIM) 100 MG tablet Take 4 tablets by mouth daily 360 tablet 0    methotrexate (RHEUMATREX) 2.5 MG chemo tablet Take 5 tablets by mouth once a week 60 tablet 0    folic acid (FOLVITE) 1 MG tablet Take 1 tablet by mouth daily 90 tablet 0    hydroxychloroquine (PLAQUENIL) 200 MG tablet Take 1 tablet by mouth 2 times daily 180 tablet 0    predniSONE (DELTASONE) 10 MG tablet Take 2 tablets by mouth every morning for 10 days, THEN 1 tablet every morning for 10 days, THEN 0.5 tablets every morning for 10 days.  35 tablet 0    Lancets MISC 1 each by Does not apply route daily Test as directed, Dispense according to insurance formulary 300 each 1    Blood Glucose Monitoring Suppl KIT 1 kit by Does not apply route daily Dispense according to insurance formulary 1 kit 0    blood glucose test strips (ASCENSIA AUTODISC VI;ONE TOUCH ULTRA TEST VI) strip 1 each by In Vitro route daily Test as directed,Dispense according to insurance formulary 300 each 1    atorvastatin (LIPITOR) 40 MG tablet Take 1 tablet by mouth daily 90 tablet 1    hydroCHLOROthiazide (MICROZIDE) 12.5 MG capsule Take 2 capsules by mouth daily 90 capsule 1    metFORMIN (GLUCOPHAGE-XR) 500 MG extended release tablet TAKE 1 TABLET EVERY NIGHT 90 tablet 1    omeprazole (PRILOSEC) 20 MG delayed release capsule Take 1 capsule by mouth 2 times daily 90 capsule 1    tamsulosin (FLOMAX) 0.4 MG capsule Take 1 capsule by mouth daily 90 capsule 1    glucose monitoring (FREESTYLE FREEDOM) kit 1 kit by Does not apply route daily 1 kit 0    aspirin 81 MG EC tablet Take 81 mg by mouth daily      Cholecalciferol 50 MCG (2000 UT) TABS Take 2,000 Units by mouth daily      Ferrous Sulfate 324 MG TBEC Take 1 tablet by mouth daily (with breakfast)      Krill Oil 300 MG CAPS Take 300 mg by mouth nightly      Multiple Vitamins-Minerals (OCUVITE-LUTEIN) CAPS multivitamin Take 1 tablet by mouth daily       No current facility-administered medications for this visit.         OBJECTIVE:  Physical Exam:  /78   Pulse 71   Ht 6' 3\" (1.905 m)   Wt 245 lb (111.1 kg)   SpO2 96%   BMI 30.62 kg/m²     GEN: AAOx3, in NAD, well-appearing  HEAD: normocephalic, atraumatic  EYES: no injection or icterus  CVS: RRR  LUNGS: in no acute respiratory distress, CTAB  MSK:  Spine: no paraspinal muscle or vertebral tenderness, SI joints NTTP  Upper extremities:              Hands: b/l MCP joints w/ synovial proliferation and synovitis TTP, b/l PIP joints w/ bony enlargement and synovitis TTP, b/l DIP joints w/o significant swelling slightly TTP, full fist formation w/ weak  strength, no evidence of sclerodactyly, calcinosis, digital ulcers or pitting              Wrist: b/l wrist joints w/ synovial proliferation and synovitis TTP, limited ROM              Elbow: no synovitis or bursitis, small soft tissue nodule in lateral aspect of the L elbow, FROM  Lower extremities:              Knees: no warmth or effusion present, FROM              Ankles: no synovitis, FROM, Achilles tendons w/o swelling or warmth NTTP              Feet: no toe swelling or pain or warmth on palpation w/ FROM, -MTP squeeze test b/l  INTEGUMENT: no tophaceous deposits, no rash or psoriatic lesions, no petechiae, bruises, or palpable purpura, no patchy alopecia, no nail pitting or periungual changes, no clubbing or digital ulcers    DATA:  Labs:   I personally reviewed interval labs and discussed w/ the pt in detail which showed:    Lab Results   Component Value Date    WBC 6.9 03/30/2022    HGB 13.7 03/30/2022    HCT 40.0 (L) 03/30/2022    MCV 90.6 03/30/2022     03/30/2022    LYMPHOPCT 25.8 03/30/2022    RBC 4.42 03/30/2022    MCH 31.0 03/30/2022    MCHC 34.2 03/30/2022    RDW 15.8 (H) 03/30/2022     Lab Results   Component Value Date     03/30/2022    K 4.1 03/30/2022     03/30/2022    CO2 23 03/30/2022    BUN 16 03/30/2022    CREATININE 1.2 03/30/2022    GLUCOSE 126 (H) 03/30/2022    CALCIUM 9.2 03/30/2022    PROT 6.5 03/30/2022    LABALBU 3.8 03/30/2022    BILITOT 0.6 03/30/2022    ALKPHOS 153 (H) 03/30/2022    AST 17 03/30/2022    ALT 18 03/30/2022    LABGLOM 59 (A) 03/30/2022    GFRAA >60 03/30/2022    AGRATIO 1.0 (L) 01/24/2022     Lab Results   Component Value Date    VITD25 38.5 03/30/2022     Vitamin D 1,25-dihydroxy wnl (3/30/22)    Lab Results   Component Value Date    C3 148.2 03/30/2022     Lab Results   Component Value Date    C4 33.2 03/30/2022     Lab Results   Component Value Date    ANTIDSDNAIGG 3 03/30/2022      Lab Results   Component Value Date    LABURIC 6.3 03/30/2022   Urci acid 7.2 (3/25/20) --> 7.9 (8/7/20) --> 5.5 (2/4/21) --> 6.1 (8/4/21)    Lab Results   Component Value Date    CRP <3.0 03/30/2022    CRP 12.3 (H) 01/24/2022     Lab Results   Component Value Date    SEDRATE 40 (H) 03/30/2022    SEDRATE 80 (H) 01/24/2022     No results found for: CKTOTAL     RF 15.0, CCP >300 (3/30/22)  REEMA 1:160 speckled pattern (3/30/22)  Positive RNP and SSA, rest of LADONNA panel negative (3/30/22)  Serum ACE 83 (3/30/22)  Negative hepatitis B surface Ag and core total Ab, hepatitis C Ab (3/30/22)    Imaging:  I personally reviewed interval imaging and discussed w/ the pt in detail which included:    PET CT (4/5/21): FINDINGS:   HEAD/NECK:   There is a focal hypermetabolic (maximum SUV 89.2) soft tissue lesion measuring 3 x 1.8 cm (series 3 image 72) surrounding the left aspect of the hyoid bone and extending into the left thyroid cartilage and pre-epiglottic fat. There are multiple small hypermetabolic lymph nodes in the bilateral lower neck. CHEST:   There are mildly enlarged bilateral axillary lymph nodes with preserved fatty fransisco, with the left axillary lymph node measuring a maximum SUV of 5.3. Moderate upper lung zone predominant centrilobular and paraseptal emphysema. Bandlike opacities at the lung bases, likely subsegmental atelectasis/scarring. No abnormal FDG uptake in the lungs, including in the 1.4 cm left upper lobe nodule. Mild coronary artery calcifications. ABDOMEN/PELVIS:   Mild cortical renal scarring. Scattered atherosclerotic calcifications throughout the aorta and its branches. Prostatomegaly. There are mildly hypermetabolic bilateral inguinal lymph nodes with preserved fatty fransisco. BONES/SOFT TISSUES:   There is symmetric increased FDG uptake in the bilateral glenohumeral joints, elbow joints, wrists, and hips, and at C1/dens, likely related to arthritis. The well circumscribed lucent lesion in the right humeral head does not demonstrate increased FDG uptake and is likely a benign lesion.  No abnormal FDG uptake in the skeleton. IMPRESSION:   1.  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 2.  Multiple small lymph nodes in the lower neck and mildly enlarged bilateral axillary lymph nodes are indeterminate. While they demonstrate increased FDG uptake, no suspicious morphological features are seen and they may be reactive or inflammatory. Recommend continued attention on follow-up or comparison with outside prior imaging. 3.  No abnormal FDG uptake in the lungs, including in the left upper lobe nodule. Consider continued chest CT follow up imaging. 4.  Polyarticular arthritis with FDG uptake, which may be inflammatory. XR Right Hand:  XR HAND RIGHT (MIN 3 VIEWS) 03/30/2022    Narrative  EXAM: XR FOOT RIGHT (MIN 3 VIEWS), XR HAND LEFT (MIN 3 VIEWS), XR FOOT LEFT (MIN 3 VIEWS), XR HAND RIGHT (MIN 3 VIEWS)    INDICATION:  Bilateral foot and hand pain, evaluate for inflammatory arthritis    COMPARISON: None    VIEWS: 3 views each of the right pneumothorax and right and left hand. FINDINGS:    Right foot:  No acute fracture or osseous destruction. There is no soft tissue swelling, joint subluxation, periarticular osteopenia, or erosions. There is mild first MTP osteoarthrosis. Os navicularis is present. Left foot:  There are periarticular erosive changes at the first MTP joint. There is no associated soft tissue swelling. No other acute inflammatory findings identified. Os navicularis is present. Right hand:  Periarticular erosions of the first MCP joint with mild subluxation. Periarticular erosions at the radiocarpal and ulnar carpal joints. Multiple bone cysts within the carpal joints are present. Left hand:  Periarticular erosions at the radiocarpal and intercarpal joints. Multiple bone cysts within the carpal joints are present. Erosive changes at the STT joint.     Impression  Right foot:  1. Mild first MTP osteoarthrosis. 2.  No evidence of inflammatory or arthritis. Left foot:  1. Erosive changes at the first MTP joint can be seen with gout given the common location however other inflammatory arthritides included in the differential.    Right hand:  1. Multifocal periarticular erosions at the first MCP joint and wrist can be seen with crystal deposition such as gout or other inflammatory arthritides. Left hand:  1. Multifocal periarticular erosions in the wrist can be seen with can be seen with crystal deposition such as gout or other inflammatory arthritides. XR Left Hand:  XR HAND LEFT (MIN 3 VIEWS) 03/30/2022    Narrative  EXAM: XR FOOT RIGHT (MIN 3 VIEWS), XR HAND LEFT (MIN 3 VIEWS), XR FOOT LEFT (MIN 3 VIEWS), XR HAND RIGHT (MIN 3 VIEWS)    INDICATION:  Bilateral foot and hand pain, evaluate for inflammatory arthritis    COMPARISON: None    VIEWS: 3 views each of the right pneumothorax and right and left hand. FINDINGS:    Right foot:  No acute fracture or osseous destruction. There is no soft tissue swelling, joint subluxation, periarticular osteopenia, or erosions. There is mild first MTP osteoarthrosis. Os navicularis is present. Left foot:  There are periarticular erosive changes at the first MTP joint. There is no associated soft tissue swelling. No other acute inflammatory findings identified. Os navicularis is present. Right hand:  Periarticular erosions of the first MCP joint with mild subluxation. Periarticular erosions at the radiocarpal and ulnar carpal joints. Multiple bone cysts within the carpal joints are present. Left hand:  Periarticular erosions at the radiocarpal and intercarpal joints. Multiple bone cysts within the carpal joints are present. Erosive changes at the STT joint. Impression  Right foot:  1. Mild first MTP osteoarthrosis. 2.  No evidence of inflammatory or arthritis. Left foot:  1.   Erosive changes at the first MTP joint can be seen with gout given the common location however other inflammatory arthritides included in the differential.    Right hand:  1. Multifocal periarticular erosions at the first MCP joint and wrist can be seen with crystal deposition such as gout or other inflammatory arthritides. Left hand:  1. Multifocal periarticular erosions in the wrist can be seen with can be seen with crystal deposition such as gout or other inflammatory arthritides. I independently reviewed above x-rays, agree with findings of periarticular erosions in the R 1st MCP, b/l carpal and L 1st MTP joints. R 1st MCP erosion w/ punched out appearance suspicious for gout. Above results were discussed w/ the pt in detail during today's visit.

## 2022-04-12 LAB — LYSOZYME: NORMAL

## 2022-04-13 ENCOUNTER — OFFICE VISIT (OUTPATIENT)
Dept: RHEUMATOLOGY | Age: 72
End: 2022-04-13
Payer: COMMERCIAL

## 2022-04-13 ENCOUNTER — HOSPITAL ENCOUNTER (OUTPATIENT)
Dept: GENERAL RADIOLOGY | Age: 72
Discharge: HOME | End: 2022-04-13
Payer: COMMERCIAL

## 2022-04-13 VITALS
BODY MASS INDEX: 30.46 KG/M2 | SYSTOLIC BLOOD PRESSURE: 132 MMHG | OXYGEN SATURATION: 96 % | WEIGHT: 245 LBS | DIASTOLIC BLOOD PRESSURE: 78 MMHG | HEART RATE: 71 BPM | HEIGHT: 75 IN

## 2022-04-13 DIAGNOSIS — M05.79 SEROPOSITIVE RHEUMATOID ARTHRITIS OF MULTIPLE JOINTS (HCC): ICD-10-CM

## 2022-04-13 DIAGNOSIS — R91.1 NODULE OF UPPER LOBE OF LEFT LUNG: ICD-10-CM

## 2022-04-13 DIAGNOSIS — M05.79 SEROPOSITIVE RHEUMATOID ARTHRITIS OF MULTIPLE JOINTS (HCC): Primary | ICD-10-CM

## 2022-04-13 DIAGNOSIS — Z79.899 HIGH RISK MEDICATION USE: ICD-10-CM

## 2022-04-13 DIAGNOSIS — M10.00 IDIOPATHIC GOUT, UNSPECIFIED CHRONICITY, UNSPECIFIED SITE: ICD-10-CM

## 2022-04-13 PROCEDURE — 99215 OFFICE O/P EST HI 40 MIN: CPT | Performed by: INTERNAL MEDICINE

## 2022-04-13 PROCEDURE — 71046 X-RAY EXAM CHEST 2 VIEWS: CPT

## 2022-04-13 RX ORDER — FOLIC ACID 1 MG/1
1 TABLET ORAL DAILY
Qty: 90 TABLET | Refills: 0 | Status: SHIPPED | OUTPATIENT
Start: 2022-04-13 | End: 2022-05-18 | Stop reason: SDUPTHER

## 2022-04-13 RX ORDER — HYDROXYCHLOROQUINE SULFATE 200 MG/1
200 TABLET, FILM COATED ORAL 2 TIMES DAILY
Qty: 180 TABLET | Refills: 0 | Status: SHIPPED | OUTPATIENT
Start: 2022-04-13 | End: 2022-05-18 | Stop reason: SDUPTHER

## 2022-04-13 RX ORDER — NAPROXEN 500 MG/1
500 TABLET ORAL 2 TIMES DAILY PRN
Qty: 60 TABLET | Refills: 1 | Status: CANCELLED | OUTPATIENT
Start: 2022-04-13

## 2022-04-13 RX ORDER — ALLOPURINOL 100 MG/1
400 TABLET ORAL DAILY
Qty: 360 TABLET | Refills: 0 | Status: SHIPPED | OUTPATIENT
Start: 2022-04-13 | End: 2022-05-18 | Stop reason: SDUPTHER

## 2022-04-13 RX ORDER — PREDNISONE 10 MG/1
TABLET ORAL
Qty: 35 TABLET | Refills: 0 | Status: SHIPPED | OUTPATIENT
Start: 2022-04-13 | End: 2022-05-13

## 2022-04-13 NOTE — PATIENT INSTRUCTIONS
Rheumatoid Arthritis     Fast Facts  Rheumatoid arthritis (RA) is the most common type of autoimmune arthritis. It is triggered by a faulty immune system (the bodys defense system) and affects the wrist and small joints of the hand, including the knuckles and the middle joints of the fingers. Treatments have improved greatly and help many of those affected. For most people with RA, early treatment can control joint pain and swelling, and lessen joint damage. Perform low-impact aerobic exercises, such as walking, and exercises to boost muscle strength. This will improve your overall health and reduce pressure on your joints. Studies show that people who receive early treatment for RA feel better sooner and more often, and are more likely to lead an active life. They also are less likely to have the type of joint damage that leads to joint replacement. Seek an expert in arthritis: a rheumatologist. Expertise is vital to make an early diagnosis of RA and to rule out diseases that mimic RA, thus avoiding unneeded tests and treatments. Rheumatologists are experts in RA and can design a customized treatment plan that is best suited for you. People have long feared rheumatoid arthritis (commonly called RA) as one of the most disabling types of arthritis. The good news is that the outlook has greatly improved for many people with newly diagnosed (detected) RA. Of course, RA remains a serious disease, and one that can vary widely in symptoms (what you feel) and outcomes. Even so, treatment advances have made it possible to stop or at least slow the progression (worsening) of joint damage. Rheumatologists now have many new treatments that target the inflammation that RA causes. They also understand better when and how to use treatments to get the best effects. What is rheumatoid arthritis? RA is the most common form of autoimmune arthritis, affecting more than 1.3 million Americans.  Of these, about 75 percent are women. In fact, 1-3 percent of women may get rheumatoid arthritis in their lifetime. The disease most often begins between the fourth and sixth decades of life. However, RA can start at any age. RA is a chronic (long-term) disease that causes pain, stiffness, swelling and limited motion and function of many joints. While RA can affect any joint, the small joints in the hands and feet tend to be involved most often. Inflammation sometimes can affect organs as well, for instance, the eyes or lungs. The stiffness seen in active RA is most often worst in the morning. It may last one to two hours (or even the whole day). Stiffness for a long time in the morning is a clue that you may have RA, since few other arthritic diseases behave this way. For instance, osteoarthritis most often does not cause prolonged morning stiffness. Other signs and symptoms that can occur in RA include: Loss of energy   Low fevers   Loss of appetite   Dry eyes and mouth from a related health problem, Sjogren's syndrome   Firm lumps, called rheumatoid nodules, which grow beneath the skin in places such as the elbow and hands    What causes rheumatoid arthritis? RA is an autoimmune disease. This means that certain cells of the immune system do not work properly and start attacking healthy tissues -- the joints in RA. The cause of RA is not known. Yet, new research is giving us a better idea of what makes the immune system attack the body and create inflammation. In RA, the focus of the inflammation is in the synovium, the tissue that lines the joint. Immune cells release inflammation-causing chemicals. These chemicals can damage cartilage (the tissue that cushions between joints) and bone. Other things likely play a role in RA as well. For instance, genes that affect the immune system may make some people more prone to getting RA. How is rheumatoid arthritis diagnosed?   RA can be hard to detect because it may begin with subtle symptoms, such as achy joints or a little stiffness in the morning. Also, many diseases behave like RA early on. For this reason, if you or your primary care physician thinks you have RA, you should see a rheumatologist. A rheumatologist is a physician with the skill and knowledge to reach a correct diagnosis of RA and to make the most suitable treatment plan. Diagnosis of RA depends on the symptoms and results of a physical exam, such as warmth, swelling and pain in the joints. Some blood tests also can help confirm RA. Telltale signs include: Anemia (a low red blood cell count)   Rheumatoid factor (an antibody, or blood protein, found in about 80 percent of patients with RA in time, but in as few as 30 percent at the start of arthritis)   Antibodies to cyclic citrullinated peptides (pieces of proteins), or anti-CCP for short (found in 60-70 percent of patients with RA)   Elevated erythrocyte sedimentation rate (a blood test that, in most patients with RA, confirms the amount of inflammation in the joints)  X-rays can help in detecting RA, but may not show anything abnormal in early arthritis. Even so, these first X-rays may be useful later to show if the disease is progressing. Often, MRI and ultrasound scanning are done to help  the severity of RA. There is no single test that confirms an RA diagnosis for most patients with this disease. (This is above all true for patients who have had symptoms fewer than six months.) Rather, a doctor makes the diagnosis by looking at the symptoms and results from the physical exam, lab tests and X-rays. How is rheumatoid arthritis treated? Therapy for RA has improved greatly in the past 30 years. Current treatments give most patients good or excellent relief of symptoms and let them keep functioning at, or near, normal levels. With the right medications, many patients can achieve remission -- that is, have no signs of active disease. There is no cure for RA.  The goal of treatment is to lessen your symptoms and poor function. Doctors do this by starting proper medical therapy as soon as possible, before your joints have lasting damage. No single treatment works for all patients. Many people with RA must change their treatment at least once during their lifetime. Good control of RA requires early diagnosis and, at times, aggressive treatment. Thus, patients with a diagnosis of RA should begin their treatment with disease-modifying antirheumatic drugs -- referred to as DMARDs. These drugs not only relieve symptoms but also slow progression of the disease. Often, doctors prescribe DMARDs along with nonsteroidal anti-inflammatory drugs or NSAIDs and/or low-dose corticosteroids, to lower swelling, pain and fever. DMARDs have greatly improved the symptoms, function and quality of life for nearly all patients with RA. Ask your rheumatologist about the need for DMARD therapy and the risks and benefits of these drugs. Common DMARDs include methotrexate (Rheumatrex, Trexall, Otrexup, Rasuvo), leflunomide (Arava), hydroxychloroquine (Plaquenil) and sulfasalazine (Azulfidine). Gold is an older DMARD that is often given as an injection into a muscle (such as Myochrysine), but can also be given as a pill -- auranofin (Ridaura). The antibiotic minocycline (Minocin) also is a DMARD, as well as the immune suppressants azathioprine (Imuran) and cyclosporine (Neoral, Sandimmune, Gengraf). These three drugs and gold are rarely prescribed for RA these days, because other drugs work better or have fewer side effects. Patients with more serious disease may need medications called biologic response modifiers or biologic agents.  They can target the parts of the immune system and the signals that lead to inflammation and joint and tissue damage.  FDA-approved drugs of this type include abatacept (Orencia), adalimumab (Humira), anakinra (Kineret), certolizumab (Cimzia), etanercept (Enbrel), golimumab (Simponi) infliximab (Remicade), rituximab (Rituxan, MabThera) and tocilizumab (Actemra). Most often, patients take these drugs with methotrexate, as the mix of medicines is more helpful. Janus kinase (DEMETRIO) inhibitors are another type of DMARD. People who cannot be treated with methotrexate alone may be prescribed a DEMETRIO inhibitor such as tofacitinib Cinthya Da Silva). The best treatment of RA needs more than medicines alone. Patient education, such as how to cope with RA, also is important. Proper care requires the expertise of a team of providers, including rheumatologists, primary care physicians, and physical and occupational therapists. You will need frequent visits through the year with your rheumatologist. These checkups let your doctor track the course of your disease and check for any side effects of your medications. You likely also will need to repeat blood tests and X-rays or ultrasounds from time to time. Living with rheumatoid arthritis  Research shows that people with RA, mainly those whose disease is not well controlled, have a higher risk for heart disease and stroke. Talk with your doctor about these risks and ways to lower them. It is important to be physically active most of the time, but to sometimes scale back activities when the disease flares. In general, rest is helpful when a joint is inflamed, or when you feel tired. At these times, do gentle range-of-motion exercises, such as stretching. This will keep the joint flexible. When you feel better, do low-impact aerobic exercises, such as walking, and exercises to boost muscle strength. This will improve your overall health and reduce pressure on your joints. A physical or occupational therapist can help you find which types of activities are best for you, and at what level or pace you should do them. Finding that you have a chronic illness is a life-changing event. It can cause worry and sometimes feelings of isolation or depression. Thanks to greatly improved treatments, these feelings tend to decrease with time as energy improves, and pain and stiffness decrease. Discuss these normal feelings with your health care providers. They can provide helpful information and resources. The rheumatologist's role in the treatment of rheumatoid arthritis  RA is a complex disease, but many advances in treatment have occurred recently. Rheumatologists are doctors who are experts in diagnosing and treating arthritis and other diseases of the joints, muscles and bones. Thus, they are best qualified to make a proper diagnosis of RA. They can also advise patients about the best treatment options. Hydroxychloroquine (Plaquenil) instructions: Take one tablet daily for the first week. If well tolerated, then take one tablet twice a day. Hydroxychloroquine     Pronunciation: eros drox misael STONE oh fern   Brand: Plaquenil Sulfate     What is hydroxychloroquine? Hydroxychloroquine is used to treat or prevent malaria, a disease caused by parasites that enter the body through the bite of a mosquito. Malaria is common in areas such as [place], [place], and [place]. Hydroxychloroquine is also used to treat symptoms of rheumatoid arthritis and discoid or systemic lupus erythematosus. Hydroxychloroquine may also be used for purposes not listed in this medication guide. What should I discuss with my health care provider before taking hydroxychloroquine? You should not use this medication if you are allergic to hydroxychloroquine, or if you have a history of vision changes or damage to your retina caused by an anti-malaria medication. Hydroxychloroquine should not be used for long-term treatment in children. To make sure hydroxychloroquine is safe for you, tell your doctor if you have any of these conditions:    psoriasis;    porphyria;    liver disease;    alcoholism; or    glucose-6-phosphate dehydrogenase (G-6-PD) deficiency.   It is not known whether hydroxychloroquine will harm an unborn baby. Tell your doctor if you are pregnant or plan to become pregnant while using this medication. Malaria is more likely to cause death in a pregnant woman. If you are pregnant, talk with your doctor about the risks of traveling to areas where malaria is common. It is not known whether hydroxychloroquine passes into breast milk or if it could harm a nursing baby. Do not use this medication without telling your doctor if you are breast-feeding a baby. How should I take hydroxychloroquine? Take exactly as prescribed by your doctor. Do not take in larger or smaller amounts or for longer than recommended. Follow the directions on your prescription label. Take hydroxychloroquine with a meal or a glass of milk, unless your doctor tells you otherwise. Hydroxychloroquine is sometimes given only once per week. Choose the same day each week to take this medication if you are on a weekly dosing schedule. To prevent malaria: Start taking the medicine 2 weeks before entering an area where malaria is common. Continue taking the medicine regularly during your stay and for at least 8 weeks after you leave the area. To treat malaria: Your doctor may recommend a single dose, or a high starting dose followed by a smaller dose during the last 2 days of treatment. Follow your doctor's instructions. Take this medicine for the full prescribed length of time for malaria. Your symptoms may improve before the infection is completely cleared. In addition to taking hydroxychloroquine, use protective clothing, insect repellents, and mosquito netting around your bed to further prevent mosquito bites that could cause malaria. Contact your doctor as soon as possible if you have been exposed to malaria, or if you have fever or other symptoms of illness during or after a stay in an area where malaria is common.    When treating lupus or arthritis, hydroxychloroquine is usually given daily for several weeks or months. For best results, keep using the medication as directed. Talk with your doctor if your symptoms do not improve after 6 months of treatment. While using hydroxychloroquine, you may need frequent blood tests at your doctor's office. No medication is 100% effective in treating or preventing all types of malaria. For best results, keep using the medication as directed. Talk with your doctor if you have fever, vomiting, or diarrhea during your treatment. Store at room temperature away from moisture, heat, and light. What happens if I miss a dose? Take the missed dose as soon as you remember. Skip the missed dose if it is almost time for your next scheduled dose. Do not take extra medicine to make up the missed dose. What happens if I overdose? Seek emergency medical attention or call the Poison Help line at 1-605.362.5988. An overdose of hydroxychloroquine can be fatal, especially in children. Treatment of a hydroxychloroquine overdose must be started quickly. You may be told to induce vomiting right away (at home, before transport to an emergency room). Ask the poison control center how to induce vomiting in the case of a hydroxychloroquine overdose. Overdose symptoms may include headache, drowsiness, vision changes, slow heart rate, chest pain or heavy feeling, pain spreading to the arm or shoulder, nausea, sweating, seizure (convulsions), shallow breathing, or breathing that stops. What should I avoid while taking hydroxychloroquine? Avoid taking an antacid or Kaopectate (kaolin-pectin) within 4 hours before or after you take hydroxychloroquine. Some antacids can make it harder for your body to absorb hydroxychloroquine. What are the possible side effects of hydroxychloroquine? Some people taking this medication over long periods of time or at high doses have developed irreversible damage to the retina of the eye.  Stop taking hydroxychloroquine and call your doctor at once if you have trouble focusing, if you see light streaks or flashes in your vision, or if you notice any swelling or color changes in your eyes. Get emergency medical help if you have any of these signs of an allergic reaction: hives; difficulty breathing; swelling of your face, lips, tongue, or throat. Call your doctor at once if you have a serious side effect such as:    muscle weakness, twitching, or uncontrolled movement;    loss of balance or coordination;    blurred vision, light sensitivity, seeing halos around lights;    pale skin, easy bruising or bleeding;    confusion, unusual thoughts or behavior; or    seizure (convulsions). Less serious side effects may include:    headache, ringing in your ears, spinning sensation;    nausea, vomiting, stomach pain;    loss of appetite, weight loss;    mood changes, feeling nervous or irritable;    skin rash or itching; or    hair loss.    This is not a complete list of side effects and others may occur. Call your doctor for medical advice about side effects. You may report side effects to FDA at 0-860-FDA-4174. What other drugs will affect hydroxychloroquine? Hydroxychloroquine can harm your liver. This effect is increased when you also use other medicines harmful to the liver.  You may need dose adjustments or special tests if you have recently used:    acetaminophen (Tylenol);    an antibiotic, antifungal medicine, sulfa drug, or tuberculosis medicine;    birth control pills or hormone replacement therapy;    blood pressure medication;    cancer medications;    cholesterol-lowering medications such as Crestor, Lipitor, Pravachol, Simcor, Vytorin, Zocor;    gout or arthritis medications (including gold injections);    HIV/AIDS medications;    medicines to treat psychiatric disorders;    an NSAID such as Advil, Aleve, Arthrotec, Cataflam, Celebrex, Indocin, Motrin, Naprosyn, Treximet, Voltaren; or    seizure medications.    This list is not complete and other drugs may interact with hydroxychloroquine. Tell your doctor about all medications you use. This includes prescription, over-the-counter, vitamin, and herbal products. Do not start a new medication without telling your doctor. Where can I get more information? Your pharmacist can provide more information about hydroxychloroquine. Remember, keep this and all other medicines out of the reach of children, never share your medicines with others, and use this medication only for the indication prescribed. Every effort has been made to ensure that the information provided by [address] is accurate, up-to-date, and complete, but no guarantee is made to that effect. Drug information contained herein may be time sensitive. [facility] information has been compiled for use by healthcare practitioners and consumers in the [place] and therefore [facility] does not warrant that uses outside of the [place] are appropriate, unless specifically indicated otherwise. [facility]'s drug information does not endorse drugs, diagnose patients or recommend therapy. [facility]'s drug information is an informational resource designed to assist licensed healthcare practitioners in caring for their patients and/or to serve consumers viewing this service as a supplement to, and not a substitute for, the expertise, skill, knowledge and judgment of healthcare practitioners. The absence of a warning for a given drug or drug combination in no way should be construed to indicate that the drug or drug combination is safe, effective or appropriate for any given patient. [facility] does not assume any responsibility for any aspect of healthcare administered with the aid of information [facility] provides.  The information contained herein is not intended to cover all possible uses, directions, precautions, warnings, drug interactions, allergic reactions, or adverse effects. If you have questions about the drugs you are taking, check with your doctor, nurse or pharmacist.   Copyright 0321-5497 [address]: 7.01. Revision date: 11/13/2012. This information does not replace the advice of a doctor. [employer] disclaims any warranty or liability for your use of this information. Content Version: 61.9.619942    METHOTREXATE DOSING    1. Start taking 4 tablets of Methotrexate once a week for the first 2 weeks. If well-tolerated, then start taking 5 tablets all at once once a week. 2. Check laboratory studies in 4 weeks after starting the Methotrexate  3. Take Folic Acid 1 mg daily     Hold Methotrexate if you are on antibiotics. Restart once and have recovered from the infection. No alcohol while on Methotrexate! PLEASE CALL WITH ANY QUESTIONS OR CONCERNS [phone]    Methotrexate (oral)     Pronunciation: meth oh TREX ate   Brand: Rheumatrex Dose Pack, Trexall   What is methotrexate? Methotrexate interferes with the growth of certain cells of the body, especially cells that reproduce quickly, such as cancer cells, bone marrow cells, and skin cells. Methotrexate is used to treat certain types of cancer of the breast, skin, head and neck, or lung. Methotrexate is also used to treat severe psoriasis and rheumatoid arthritis. Methotrexate is usually given after other medications have been tried without successful treatment of symptoms. Methotrexate may also be used for purposes not listed in this medication guide. What should I discuss with my healthcare provider before taking methotrexate? You should not use this medicine if you are allergic to methotrexate.  Do not use methotrexate to treat psoriasis or rheumatoid arthritis if you have:    alcoholism, cirrhosis, or other liver disease;    a blood cell disorder such as anemia (lack of red blood cells) or leukopenia (lack of white blood cells);    a bone marrow disorder; or    if you are breast-feeding a baby. Methotrexate is sometimes used to treat cancer even when patients do have one of the conditions listed above. Your doctor will decide if this treatment is right for you. To make sure methotrexate is safe for you, tell your doctor if you have:    kidney disease;    a folate deficiency;    pneumonia or lung disease;    stomach ulcers;    any type of infection; or    if you are receiving radiation treatments. Methotrexate can cause birth defects in an unborn baby. Do not use methotrexate to treat psoriasis or rheumatoid arthritis if you are pregnant. Tell your doctor right away if you become pregnant during treatment. You may need to have a negative pregnancy test before starting this treatment. Use birth control to prevent pregnancy while you are using methotrexate, whether you are a man or a woman. Methotrexate use by either parent may cause birth defects. If you are a man, use a condom to keep from causing a pregnancy while you are using methotrexate. Continue using condoms for at least 90 days after your treatment ends. If you are a woman, use an effective form of birth control while you are taking methotrexate, and for at least one cycle of ovulation after your treatment ends. Do not give this medicine to a child without the advice of a doctor. How should I take methotrexate? Follow all directions on your prescription label. Do not take this medicine in larger or smaller amounts or for longer than recommended. You must use the correct dose of methotrexate for your condition. Methotrexate is sometimes taken once or twice per week and not every day. Follow the directions on your prescription label. Some people have  after taking methotrexate every day by accident. Ask your doctor or pharmacist if you have questions about your dose of methotrexate or how often to take it. Use methotrexate regularly to get the most benefit.  Get your prescription refilled before you run out of medicine completely. Methotrexate can lower blood cells that help your body fight infections and help your blood to clot. Your blood will need to be tested often, and you may need an occasional liver biopsy. Your cancer treatments may be delayed based on the results of these tests. Store at room temperature away from moisture and heat. What happens if I miss a dose? Call your doctor for instructions if you miss a dose of methotrexate. What happens if I overdose? Seek emergency medical attention or call the Poison Help line at 1-794.845.2795. An overdose of methotrexate can be fatal.   What should I avoid while taking methotrexate? This medicine can pass into body fluids (including urine, feces, vomit, semen, vaginal fluid). Patients and caregivers should wear rubber gloves while cleaning up body fluids, handling contaminated trash or laundry or changing diapers. Wash hands before and after removing gloves. Wash soiled clothing and linens separately from other laundry. Body fluids should not be handled by a woman who is pregnant or who may become pregnant. Use condoms during sexual activity to avoid exposure to body fluids. Avoid exposure to sunlight or artificial UV rays (sunlamps or tanning beds), especially if you are being treated for psoriasis. Methotrexate can make your skin more sensitive to sunlight and your psoriasis may worsen. Avoid drinking alcohol while taking methotrexate. What are the possible side effects of methotrexate? Get emergency medical help if you have signs of an allergic reaction: hives; difficulty breathing; swelling of your face, lips, tongue, or throat.    Stop using methotrexate and call your doctor at once if you have:    dry cough, shortness of breath;    diarrhea, vomiting, white patches or sores inside your mouth or on your lips;    blood in your urine or stools;    swelling, rapid weight gain, little or no urinating;    seizure (convulsions);    fever, chills, body aches, flu symptoms;    pale skin, easy bruising, unusual bleeding, weakness, feeling light-headed or short of breath;    liver problems --nausea, upper stomach pain, itching, tired feeling, loss of appetite, dark urine, piyush-colored stools, jaundice (yellowing of the skin or eyes); or    severe skin reaction --fever, sore throat, swelling in your face or tongue, burning in your eyes, skin pain, followed by a red or purple skin rash that spreads (especially in the face or upper body) and causes blistering and peeling. Older adults may be more likely to have side effects from this medicine. Common side effects may include:    vomiting, upset stomach;    headache, dizziness, tired feeling; or    blurred vision. This is not a complete list of side effects and others may occur. Call your doctor for medical advice about side effects. You may report side effects to FDA at 4-327-FDA-0187. What other drugs will affect methotrexate? Many drugs can interact with methotrexate. Not all possible interactions are listed here. Tell your doctor about all your medications and any you start or stop using during treatment with methotrexate, especially:    azathioprine;    leucovorin;    phenytoin;    probenecid;    theophylline;    an antibiotic or sulfa drugs;    isotretinoin, retinol, tretinoin;    NSAIDs (non-steroidal anti-inflammatory drugs) --ibuprofen (Advil, Motrin), naproxen (Aleve), celecoxib, diclofenac, indomethacin, meloxicam, and others; or    salicylates --aspirin, Nuprin Backache Caplet, Kaopectate, KneeRelief, Pamprin Cramp Formula, Pepto-Bismol, Tricosal, Trilisate, and others. This list is not complete and many other drugs can interact with methotrexate. This includes prescription and over-the-counter medicines, vitamins, and herbal products. Give a list of all your medicines to any healthcare provider who treats you.    Where can I get more information? Your pharmacist can provide more information about methotrexate. Remember, keep this and all other medicines out of the reach of children, never share your medicines with others, and use this medication only for the indication prescribed. Every effort has been made to ensure that the information provided by [address] is accurate, up-to-date, and complete, but no guarantee is made to that effect. Drug information contained herein may be time sensitive. [facility] information has been compiled for use by healthcare practitioners and consumers in the [place] and therefore [facility] does not warrant that uses outside of the [place] are appropriate, unless specifically indicated otherwise. [facility]'s drug information does not endorse drugs, diagnose patients or recommend therapy. [facility] drug information is an informational resource designed to assist licensed healthcare practitioners in caring for their patients and/or to serve consumers viewing this service as a supplement to, and not a substitute for, the expertise, skill, knowledge and judgment of healthcare practitioners. The absence of a warning for a given drug or drug combination in no way should be construed to indicate that the drug or drug combination is safe, effective or appropriate for any given patient. [facility] does not assume any responsibility for any aspect of healthcare administered with the aid of information [facility] provides. The information contained herein is not intended to cover all possible uses, directions, precautions, warnings, drug interactions, allergic reactions, or adverse effects. If you have questions about the drugs you are taking, check with your doctor, nurse or pharmacist.   Copyright 5434-9877 [address]. Version: 11.03. Revision date: 1/6/2015. This information does not replace the advice of a doctor.  [employer] disclaims any warranty or liability for your use of this information.    Content Version: 25.0.786417

## 2022-04-13 NOTE — ASSESSMENT & PLAN NOTE
- seen on PET CT study from 4/5/21.  - he has known melanoma and newly diagnosed high titer CCP positive RA.  - check CXR.

## 2022-04-13 NOTE — ASSESSMENT & PLAN NOTE
- discussed the disease course of his high titer CCP positive RA which portends severe, progressive disease. He had significant synovitis in his wrist and MCP joints on exam w/ limited ROM in his wrist joints. - start  mg BID and MTX 5 tabs/wk. - start Pred taper starting w/ 20 mg daily  - stop Naproxen/NSAIDs d/t mild NELLY and ineffectiveness. - check baseline CXR for new seropositive RA Dx, need to screen for ILD. Need to f/u on L lung nodule seen on prior PET CT study.

## 2022-04-13 NOTE — ASSESSMENT & PLAN NOTE
- the risk, benefits and side effects were fully explained to my best knowledge, including but not limited to skin rash, GI side effects, visual blurring, and the risk of retinal toxicity and need to have yearly eye/retinal exam. I also provided pt a detailed hand out on HCQ information. Pt verbalized understanding.  - d/w pt that MTX increases the risk of infections, birth defects, liver toxicity, rare lung problems, probable malignancy and bone marrow toxicity. Discussed need to check safety labs 4 weeks after starting MTX followed by labs every 3 months once we reach a steady dose. Strongly recommended alcohol abstinence. - the risks, benefits, and alternatives to the use of Prednisone were discussed w/ the pt. Side effects included, but were not limited development of hypertension, hyperglycemia or diabetes mellitus, peptic ulcer disease, cataracts and glaucoma, osteoporosis and risk for fractures, weight gain, cushingoid appearance, avascular necrosis, mood changes including depression and euphoria, the development of a decreased ability to fight off infections. Rare side effects like AVN are seen with long term use of steroids as also, are complications associated with withdrawing from the drug abruptly.

## 2022-04-29 DIAGNOSIS — E11.9 TYPE 2 DIABETES MELLITUS WITHOUT COMPLICATION, WITHOUT LONG-TERM CURRENT USE OF INSULIN (HCC): ICD-10-CM

## 2022-04-29 RX ORDER — METFORMIN HYDROCHLORIDE 500 MG/1
TABLET, EXTENDED RELEASE ORAL
Qty: 90 TABLET | Refills: 1 | Status: SHIPPED | OUTPATIENT
Start: 2022-04-29 | End: 2022-08-16 | Stop reason: SDUPTHER

## 2022-05-09 DIAGNOSIS — M05.79 SEROPOSITIVE RHEUMATOID ARTHRITIS OF MULTIPLE JOINTS (HCC): ICD-10-CM

## 2022-05-09 DIAGNOSIS — M10.00 IDIOPATHIC GOUT, UNSPECIFIED CHRONICITY, UNSPECIFIED SITE: ICD-10-CM

## 2022-05-09 DIAGNOSIS — Z79.899 HIGH RISK MEDICATION USE: ICD-10-CM

## 2022-05-09 LAB
ALBUMIN SERPL-MCNC: 4.1 G/DL (ref 3.4–5)
ALP BLD-CCNC: 111 U/L (ref 40–129)
ALT SERPL-CCNC: 33 U/L (ref 10–40)
AST SERPL-CCNC: 27 U/L (ref 15–37)
BASOPHILS ABSOLUTE: 0.1 K/UL (ref 0–0.2)
BASOPHILS RELATIVE PERCENT: 1 %
BILIRUB SERPL-MCNC: 0.7 MG/DL (ref 0–1)
BILIRUBIN DIRECT: <0.2 MG/DL (ref 0–0.3)
BILIRUBIN, INDIRECT: NORMAL MG/DL (ref 0–1)
C-REACTIVE PROTEIN: <3 MG/L (ref 0–5.1)
CREAT SERPL-MCNC: 1.4 MG/DL (ref 0.8–1.3)
EOSINOPHILS ABSOLUTE: 0.2 K/UL (ref 0–0.6)
EOSINOPHILS RELATIVE PERCENT: 2.6 %
GFR AFRICAN AMERICAN: >60
GFR NON-AFRICAN AMERICAN: 50
HCT VFR BLD CALC: 43.5 % (ref 40.5–52.5)
HEMOGLOBIN: 14.5 G/DL (ref 13.5–17.5)
LYMPHOCYTES ABSOLUTE: 1.6 K/UL (ref 1–5.1)
LYMPHOCYTES RELATIVE PERCENT: 21.4 %
MCH RBC QN AUTO: 31.4 PG (ref 26–34)
MCHC RBC AUTO-ENTMCNC: 33.4 G/DL (ref 31–36)
MCV RBC AUTO: 93.9 FL (ref 80–100)
MONOCYTES ABSOLUTE: 0.6 K/UL (ref 0–1.3)
MONOCYTES RELATIVE PERCENT: 7.6 %
NEUTROPHILS ABSOLUTE: 5.2 K/UL (ref 1.7–7.7)
NEUTROPHILS RELATIVE PERCENT: 67.4 %
PDW BLD-RTO: 16.4 % (ref 12.4–15.4)
PLATELET # BLD: 149 K/UL (ref 135–450)
PMV BLD AUTO: 9 FL (ref 5–10.5)
RBC # BLD: 4.64 M/UL (ref 4.2–5.9)
TOTAL PROTEIN: 6.8 G/DL (ref 6.4–8.2)
URIC ACID, SERUM: 5.8 MG/DL (ref 3.5–7.2)
WBC # BLD: 7.7 K/UL (ref 4–11)

## 2022-05-10 ENCOUNTER — TELEPHONE (OUTPATIENT)
Dept: RHEUMATOLOGY | Age: 72
End: 2022-05-10

## 2022-05-10 DIAGNOSIS — N17.9 AKI (ACUTE KIDNEY INJURY) (HCC): Primary | ICD-10-CM

## 2022-05-13 NOTE — PROGRESS NOTES
Manuel Everett MD  [address]  [facility]umatology    [x] [facility]:  [facility]  [address] Count includes the [facility] [] [facility]:  [address]   Office: [phone]  Fax: [phone]     RHEUMATOLOGY PROGRESS NOTE    ASSESSMENT/PLAN:  Johnna Redmond is a 66 y.o. male w/ newly diagnosed seropositive RA (RF 15.0, CCP >300) and gout. PMHx includes recent Hx of melanoma (on neck and trunk s/p resection, per pt localized) and T2DM. Current rheum meds:   mg BID: started on 4/13/22  MTX 12.5 mg PO wkly: started on 4/13/22  Allopurinol 400 mg daily  Vitamin D3 2000 IU daily     Past rheum meds:  Prednisone taper starting w/ 50 mg daily: prescribed by PCP on 1/26/22, \"took away all my pains\"  Aleve PRN  Naproxen 500 mg BID PRN    1. Seropositive rheumatoid arthritis of multiple joints (HCC)  Assessment & Plan:  - synovitis significantly improved since starting HCQ, MTX and Pred taper.  - pt declined biologic DMARDs d/t Hx of melanoma. As such we will maximize his conventional DMARDs. - cont  mg BID. - cont MTX 12.5 mg PO wkly for now, will adjust dose based on repeat renal function from today. Discussed switching MTX to Leflunomide if he develops GI s/e on higher dose of MTX.  - add SSZ titrated up to 1000 mg BID. - he will take low dose Prednisone taper for RA flares. Orders:  -     methotrexate (RHEUMATREX) 2.5 MG chemo tablet; Take 5 tablets by mouth once a week, Disp-60 tablet, R-0, DAWNormal  -     hydroxychloroquine (PLAQUENIL) 200 MG tablet; Take 1 tablet by mouth 2 times daily, Disp-180 tablet, [id number]  -     folic acid (FOLVITE) 1 MG tablet; Take 1 tablet by mouth daily, Disp-90 tablet, R-0Normal  -     C-Reactive Protein;  Future  -     sulfaSALAzine (AZULFIDINE) 500 MG tablet; WK 1: 1 pill with dinner   WK 2: 1 pill with meal twice daily   WK 3: 1 pill with breakfast and 2 pills with dinner  WK 4: 2 pills with meal twice daily, Disp-70 tablet, R-1Normal  2. Idiopathic gout, unspecified chronicity, unspecified site  Assessment & Plan:  - he has radiographic evidence of gouty erosion, specifically at his R 1st MCP and L 1st MTP joints. Pt denied Hx of podagra or kidney stones. No evidence of tophi on exam.  - uric acid now at goal of <6.0. Cont Allopurinol 400 mg daily (pt stated he has 300 mg and 100 mg tablets). Orders:  -     allopurinol (ZYLOPRIM) 100 MG tablet; Take 1 tablet by mouth daily, Disp-90 tablet, R-0Normal  -     C-Reactive Protein; Future  -     Uric Acid; Future  3. High risk medication use  Assessment & Plan:  - discussed his mild NELLY, pt denied any recent NSAID use. Repeat renal function now. - d/w pt that SSZ can cause live function abnormality, bone marrow toxicity, nausea and GI s/e. Discussed need to check safety labs 4 wks after starting SSZ followed by safety labs Q3mo. - will make referral to Ophthalmology for baseline eye exam for HCQ toxicity monitoring at next f/u visit. Orders:  -     Hepatic Function Panel; Future  -     CBC with Auto Differential; Future  -     Creatinine; Future     Return in about 2 months (around 7/18/2022) for lab result discussion and treatment plan, medication monitoring. TIME SPENT TODAY:  I spent over 40 minutes of face-to-face time with the pt (including taking interval history and performing physical exam, review of medical records, independently interpreting results and communicating results to the pt/family/caregiver, counseling and educating the pt/family/caregiver, implementation of treatment plan, coordination of care, documenting clinical information in the EMR) during today visit. At least 50% of this time was spent in counseling, explanation of diagnosis, planning of further management, and coordination of care. The risks and benefits of my recommendations, as well as other treatment options, benefits and side effects were discussed with the patient today.  Questions were answered. NOTE: This report is transcribed by using voice recognition software dragon. Every effort is made to ensure accuracy; however, inadvertent computerized  transcription errors may be present. SUBJECTIVE:  Past medical/surgical history, medications and allergies are reviewed and updated as appropriate. Interval Hx:   Pt states he started HCQ and MTX approximately 4 wks ago. He denies any s/e from these medications. He's not sure if his RA has significantly improved since starting HCQ and MTX. He did take a low dose Pred taper that significantly improved his polyarthralgias. He reports chronic polyarthralgias in his hands, wrists and ankle joints. Swelling has improved. Joints cont to feel stiff throughout the mroning. He denies recent gout attacks or kidney stones. He reports compliance w/ Allopurinol 400 mg daily.     ROS:  Constitutional: denies chronic fatigue, fever/chills, night sweats, unintentional weight loss  Integumentary: denies photosensitivity, rash, patchy alopecia, or Sx of Raynaud's phenomenon  Eyes: denies dry eyes, redness or pain, visual disturbance, or floaters  Nose: denies nasal ulcers or recurrent sinusitis  Oral cavity: +chronic dry mouth, denies oral ulcers  Cardiovascular: denies CP, palpitations, Hx of pericardial effusion or pericarditis  Respiratory: denies SOB, cough, hemoptysis, or pleurisy  Gastrointestinal: denies heart burn, dysphagia or esophageal dysmotility, denies change in bowel habits or Sx of IBD  Hematologic/Lymphatic: denies abnormal bruising or bleeding, denies Hx of blood clots, denies swollen LNs  Musculoskeletal:  refer to above HPI   Neurological: denies focal weakness, paresthesias/hyperesthesias or change in sensation, denies Hx of seizure, denies change in gait, balance, or memory    No Known Allergies    Past Medical History:        Diagnosis Date    Anemia     BPH (benign prostatic hyperplasia)     Diabetes mellitus (HCC)     GERD (gastroesophageal reflux disease)     Gout     Hyperlipidemia     Osteoarthritis        Past Surgical History:    No past surgical history on file. Medications:    Current Outpatient Medications   Medication Sig Dispense Refill    methotrexate (RHEUMATREX) 2.5 MG chemo tablet Take 5 tablets by mouth once a week 60 tablet 0    hydroxychloroquine (PLAQUENIL) 200 MG tablet Take 1 tablet by mouth 2 times daily 809 tablet 0    folic acid (FOLVITE) 1 MG tablet Take 1 tablet by mouth daily 90 tablet 0    allopurinol (ZYLOPRIM) 100 MG tablet Take 1 tablet by mouth daily 90 tablet 0    predniSONE (DELTASONE) 5 MG tablet Take 4 tablets by mouth daily for 7 days, THEN 3 tablets daily for 7 days, THEN 2 tablets daily for 7 days, THEN 1 tablet daily for 4 days.  67 tablet 2    sulfaSALAzine (AZULFIDINE) 500 MG tablet WK 1: 1 pill with dinner   WK 2: 1 pill with meal twice daily   WK 3: 1 pill with breakfast and 2 pills with dinner  WK 4: 2 pills with meal twice daily 70 tablet 1    metFORMIN (GLUCOPHAGE-XR) 500 MG extended release tablet TAKE 1 TABLET EVERY NIGHT 90 tablet 1    Lancets MISC 1 each by Does not apply route daily Test as directed, Dispense according to insurance formulary 300 each 1    Blood Glucose Monitoring Suppl KIT 1 kit by Does not apply route daily Dispense according to insurance formulary 1 kit 0    blood glucose test strips (ASCENSIA AUTODISC VI;ONE TOUCH ULTRA TEST VI) strip 1 each by In Vitro route daily Test as directed,Dispense according to insurance formulary 300 each 1    atorvastatin (LIPITOR) 40 MG tablet Take 1 tablet by mouth daily 90 tablet 1    hydroCHLOROthiazide (MICROZIDE) 12.5 MG capsule Take 2 capsules by mouth daily 90 capsule 1    omeprazole (PRILOSEC) 20 MG delayed release capsule Take 1 capsule by mouth 2 times daily 90 capsule 1    tamsulosin (FLOMAX) 0.4 MG capsule Take 1 capsule by mouth daily 90 capsule 1    glucose monitoring (FREESTYLE FREEDOM) kit 1 kit by Does not apply route daily 1 kit 0    aspirin 81 MG EC tablet Take 81 mg by mouth daily      Cholecalciferol 50 MCG (2000 UT) TABS Take 2,000 Units by mouth daily      Ferrous Sulfate 324 MG TBEC Take 1 tablet by mouth daily (with breakfast)      Krill Oil 300 MG CAPS Take 300 mg by mouth nightly      Multiple Vitamins-Minerals (OCUVITE-LUTEIN) CAPS multivitamin Take 1 tablet by mouth daily       No current facility-administered medications for this visit. OBJECTIVE:  Physical Exam:  /76   Pulse 77   Ht 6' 3\" (1.905 m)   Wt 240 lb (108.9 kg)   SpO2 98%   BMI 30.00 kg/m²     GEN: AAOx3, in NAD, well-appearing  HEAD: normocephalic, atraumatic  EYES: no injection or icterus  CVS: RRR  LUNGS: in no acute respiratory distress, CTAB  MSK:  Upper extremities:              Hands: prior synovitis in b/l MCP joints mostly resolved TTP, b/l PIP joints w/ bony enlargement and improved synovitis TTP, b/l DIP joints w/o significant swelling TTP, full fist formation w/ weak  strength, no evidence of sclerodactyly, calcinosis, digital ulcers or pitting              Wrist: R wrist joint w/ synovial proliferation and synovitis TTP, limited ROM              Elbow: no synovitis or bursitis, small soft tissue nodule in lateral aspect of the L elbow, FROM  Lower extremities:              Knees: no warmth or effusion present, FROM              Ankles: no synovitis, FROM, Achilles tendons w/o swelling or warmth NTTP              Feet: no toe swelling or pain or warmth on palpation w/ FROM, -MTP squeeze test b/l  INTEGUMENT: no tophaceous deposits, no rash or psoriatic lesions, no petechiae, bruises, or palpable purpura, no patchy alopecia, no nail pitting or periungual changes, no clubbing or digital ulcers    DATA:  Labs:   I personally reviewed interval labs and discussed w/ the pt in detail which showed:    Lab Results   Component Value Date    WBC 7.7 05/09/2022    HGB 14.5 05/09/2022    HCT 43.5 05/09/2022 MCV 93.9 05/09/2022     05/09/2022    LYMPHOPCT 21.4 05/09/2022    RBC 4.64 05/09/2022    MCH 31.4 05/09/2022    MCHC 33.4 05/09/2022    RDW 16.4 (H) 05/09/2022     Lab Results   Component Value Date     03/30/2022    K 4.1 03/30/2022     03/30/2022    CO2 23 03/30/2022    BUN 16 03/30/2022    CREATININE 1.4 (H) 05/09/2022    GLUCOSE 126 (H) 03/30/2022    CALCIUM 9.2 03/30/2022    PROT 6.8 05/09/2022    LABALBU 4.1 05/09/2022    BILITOT 0.7 05/09/2022    ALKPHOS 111 05/09/2022    AST 27 05/09/2022    ALT 33 05/09/2022    LABGLOM 50 (A) 05/09/2022    GFRAA >60 05/09/2022    AGRATIO 1.0 (L) 01/24/2022     Lab Results   Component Value Date    VITD25 38.5 03/30/2022     Vitamin D 1,25-dihydroxy wnl (3/30/22)    Lab Results   Component Value Date    C3 148.2 03/30/2022     Lab Results   Component Value Date    C4 33.2 03/30/2022     Lab Results   Component Value Date    ANTIDSDNAIGG 3 03/30/2022      Lab Results   Component Value Date    LABURIC 5.8 05/09/2022    LABURIC 6.3 03/30/2022   Urci acid 7.2 (3/25/20) --> 7.9 (8/7/20) --> 5.5 (2/4/21) --> 6.1 (8/4/21)    Lab Results   Component Value Date    CRP <3.0 05/09/2022    CRP <3.0 03/30/2022    CRP 12.3 (H) 01/24/2022     Lab Results   Component Value Date    SEDRATE 40 (H) 03/30/2022    SEDRATE 80 (H) 01/24/2022     No results found for: CKTOTAL     RF 15.0, CCP >300 (3/30/22)  REEMA 1:160 speckled pattern (3/30/22)  Positive RNP and SSA, rest of LADONNA panel negative (3/30/22)  Serum ACE 83 (3/30/22)  Negative hepatitis B surface Ag and core total Ab, hepatitis C Ab (3/30/22)    Imaging:  I personally reviewed interval imaging and discussed w/ the pt in detail which included:    PET CT (4/5/21): FINDINGS:   HEAD/NECK:   There is a focal hypermetabolic (maximum SUV 17.3) soft tissue lesion measuring 3 x 1.8 cm (series 3 image 72) surrounding the left aspect of the hyoid bone and extending into the left thyroid cartilage and pre-epiglottic fat.    There are multiple small hypermetabolic lymph nodes in the bilateral lower neck. CHEST:   There are mildly enlarged bilateral axillary lymph nodes with preserved fatty fransisco, with the left axillary lymph node measuring a maximum SUV of 5.3. Moderate upper lung zone predominant centrilobular and paraseptal emphysema. Bandlike opacities at the lung bases, likely subsegmental atelectasis/scarring. No abnormal FDG uptake in the lungs, including in the 1.4 cm left upper lobe nodule. Mild coronary artery calcifications. ABDOMEN/PELVIS:   Mild cortical renal scarring. Scattered atherosclerotic calcifications throughout the aorta and its branches. Prostatomegaly. There are mildly hypermetabolic bilateral inguinal lymph nodes with preserved fatty fransisco. BONES/SOFT TISSUES:   There is symmetric increased FDG uptake in the bilateral glenohumeral joints, elbow joints, wrists, and hips, and at C1/dens, likely related to arthritis. The well circumscribed lucent lesion in the right humeral head does not demonstrate increased FDG uptake and is likely a benign lesion. No abnormal FDG uptake in the skeleton. IMPRESSION:   1.  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 2.  Multiple small lymph nodes in the lower neck and mildly enlarged bilateral axillary lymph nodes are indeterminate. While they demonstrate increased FDG uptake, no suspicious morphological features are seen and they may be reactive or inflammatory. Recommend continued attention on follow-up or comparison with outside prior imaging. 3.  No abnormal FDG uptake in the lungs, including in the left upper lobe nodule. Consider continued chest CT follow up imaging. 4.  Polyarticular arthritis with FDG uptake, which may be inflammatory. X-rays (3/30/22):   FINDINGS:     Right hand:   Periarticular erosions of the first MCP joint with mild subluxation. Periarticular erosions at the radiocarpal and ulnar carpal joints. Multiple bone cysts within the carpal joints are present. Left hand:   Periarticular erosions at the radiocarpal and intercarpal joints. Multiple bone cysts within the carpal joints are present. Erosive changes at the STT joint. Right foot:   No acute fracture or osseous destruction. There is no soft tissue swelling, joint subluxation, periarticular osteopenia, or erosions. There is mild first MTP osteoarthrosis. Os navicularis is present. Left foot:   There are periarticular erosive changes at the first MTP joint. There is no associated soft tissue swelling. No other acute inflammatory findings identified. Os navicularis is present. IMPRESSION:  Right hand:   1. Multifocal periarticular erosions at the first MCP joint and wrist can be seen with crystal deposition such as gout or other inflammatory arthritides. Left hand:   1. Multifocal periarticular erosions in the wrist can be seen with can be seen with crystal deposition such as gout or other inflammatory arthritides. Right foot:   1. Mild first MTP osteoarthrosis. 2. No evidence of inflammatory or arthritis. Left foot:   1. Erosive changes at the first MTP joint can be seen with gout given the common location however other inflammatory arthritides included in the differential.       I independently reviewed above x-rays, agree with findings of periarticular erosions in the R 1st MCP, b/l carpal and L 1st MTP joints. R 1st MCP erosion w/ punched out appearance suspicious for gout. CXR (4/13/22): FINDINGS:     The lungs are clear. The cardiomediastinal contours are within normal limits There are no visible pleural abnormalities There are no acute osseous abnormalities. IMPRESSION:  No acute cardiopulmonary disease    Above results were discussed w/ the pt in detail during today's visit.

## 2022-05-18 ENCOUNTER — OFFICE VISIT (OUTPATIENT)
Dept: RHEUMATOLOGY | Age: 72
End: 2022-05-18
Payer: COMMERCIAL

## 2022-05-18 VITALS
WEIGHT: 240 LBS | HEART RATE: 77 BPM | BODY MASS INDEX: 29.84 KG/M2 | OXYGEN SATURATION: 98 % | SYSTOLIC BLOOD PRESSURE: 122 MMHG | DIASTOLIC BLOOD PRESSURE: 76 MMHG | HEIGHT: 75 IN

## 2022-05-18 DIAGNOSIS — Z79.899 HIGH RISK MEDICATION USE: ICD-10-CM

## 2022-05-18 DIAGNOSIS — N17.9 AKI (ACUTE KIDNEY INJURY) (HCC): ICD-10-CM

## 2022-05-18 DIAGNOSIS — M05.79 SEROPOSITIVE RHEUMATOID ARTHRITIS OF MULTIPLE JOINTS (HCC): Primary | ICD-10-CM

## 2022-05-18 DIAGNOSIS — M10.00 IDIOPATHIC GOUT, UNSPECIFIED CHRONICITY, UNSPECIFIED SITE: ICD-10-CM

## 2022-05-18 PROBLEM — M25.50 POLYARTHRALGIA: Status: RESOLVED | Noted: 2022-03-30 | Resolved: 2022-05-18

## 2022-05-18 PROBLEM — D69.6 THROMBOCYTOPENIA, UNSPECIFIED (HCC): Status: ACTIVE | Noted: 2022-05-18

## 2022-05-18 LAB
CREAT SERPL-MCNC: 1.1 MG/DL (ref 0.8–1.3)
GFR AFRICAN AMERICAN: >60
GFR NON-AFRICAN AMERICAN: >60

## 2022-05-18 PROCEDURE — 99215 OFFICE O/P EST HI 40 MIN: CPT | Performed by: INTERNAL MEDICINE

## 2022-05-18 RX ORDER — FOLIC ACID 1 MG/1
1 TABLET ORAL DAILY
Qty: 90 TABLET | Refills: 0 | Status: SHIPPED | OUTPATIENT
Start: 2022-05-18 | End: 2022-07-20 | Stop reason: SDUPTHER

## 2022-05-18 RX ORDER — HYDROXYCHLOROQUINE SULFATE 200 MG/1
200 TABLET, FILM COATED ORAL 2 TIMES DAILY
Qty: 180 TABLET | Refills: 0 | Status: SHIPPED | OUTPATIENT
Start: 2022-05-18 | End: 2022-07-20 | Stop reason: SDUPTHER

## 2022-05-18 RX ORDER — SULFASALAZINE 500 MG/1
TABLET ORAL
Qty: 70 TABLET | Refills: 1 | Status: SHIPPED | OUTPATIENT
Start: 2022-05-18 | End: 2022-07-20 | Stop reason: SDUPTHER

## 2022-05-18 RX ORDER — PREDNISONE 1 MG/1
TABLET ORAL
Qty: 67 TABLET | Refills: 2 | Status: SHIPPED | OUTPATIENT
Start: 2022-05-18 | End: 2022-06-12

## 2022-05-18 RX ORDER — ALLOPURINOL 100 MG/1
400 TABLET ORAL DAILY
Qty: 360 TABLET | Refills: 0 | Status: CANCELLED | OUTPATIENT
Start: 2022-05-18 | End: 2022-08-16

## 2022-05-18 RX ORDER — ALLOPURINOL 100 MG/1
100 TABLET ORAL DAILY
Qty: 90 TABLET | Refills: 0 | Status: SHIPPED | OUTPATIENT
Start: 2022-05-18 | End: 2022-07-20 | Stop reason: SDUPTHER

## 2022-05-18 NOTE — PATIENT INSTRUCTIONS
continues to be useful for treating for mild to moderate symptoms, or may be given along with other drugs for more severe symptoms of rheumatoid arthritis. It is also used for other conditions, including juvenile idiopathic arthritis (also called juvenile rheumatoid arthritis), ankylosing spondylitis, psoriatic arthritis, reactive arthritis, and ulcerative colitis. How it works Sulfasalazine is a DMARD. DMARDs work to decrease pain and inflammation, reduce/prevent joint damage, and preserve joint mobility. So, Sulfasalazine treats swelling, pain and stiffness in arthritis. Dosing Sulfasalazine comes in a 500 milligram tablet and should be taken with food and a full glass of water to avoid an upset stomach. The medication is often started at low doses when treating rheumatoid arthritis to prevent side effects, typically 1 to 2 tablets a day. After the first week, the dose may be slowly increased to the usual dosage of 2 tablets (1 gram) twice a day. This dose can be increased to up to 6 pills (3 grams) a day in some situations. An Enteric-coated (or stomach-coated) preparation is available that may lessen some of the side effects associated with sulfasalazine, particularly stomach upset. This form of sulfasalazine should not be crushed or chewed. Time to effect It usually takes between 1 and 3 months to notice any improvement in rheumatoid arthritis symptoms after starting sulfasalazine    Time to effect It usually takes between 1 and 3 months to notice any improvement in rheumatoid arthritis symptoms after starting sulfasalazine     Drug interactions Sulfasalazine may interfere with warfarin (Coumadin), cyclosporine or digoxin, so dose adjustments may be needed if these medications are taken together.  Sulfasalazine increases the risk for liver injury if given with the drug isoniazid (INH) for tuberculosis and may increase the risk for low blood sugar in patients taking certain diabetes drugs - sulfonylureas such as glimepiride (Amaryl), glyburide (Diabeta, Micronase, Glynase) and glipizide (Glucotrol). Tell your doctor if you have ever had any unusual or allergic reaction to any other sulfa medicines as well as medicines that are chemically related to sulfa drugs. Your doctor will then determine whether you should take sulfasalazine. Sulfa drugs and those related to them include: Some antibiotics (often used to treat urinary or upper respiratory infections): trimethoprim-sulfamethoxazole (Bactrim, Septra) sulfadiazine, sulfisoxizole (Gantrisin), and dapsone Fluid and blood pressure pills such as furosemide (Lasix) and thiazide diuretics (hydrochlorothiazide or HCTZ) Some diabetes medications Some glaucoma medications such as acetazolamide (Diamox), dichlorphenamide (Daranide) and methazolamide (Neptazane) Salicylates such as aspirin and the Foreman-2 inhibitor celecoxib (Celebrex)     - See more at: [email or web address]

## 2022-05-18 NOTE — ASSESSMENT & PLAN NOTE
- synovitis significantly improved since starting HCQ, MTX and Pred taper.  - pt declined biologic DMARDs d/t Hx of melanoma. As such we will maximize his conventional DMARDs. - cont  mg BID. - cont MTX 12.5 mg PO wkly for now, will adjust dose based on repeat renal function from today. Discussed switching MTX to Leflunomide if he develops GI s/e on higher dose of MTX.  - add SSZ titrated up to 1000 mg BID. - he will take low dose Prednisone taper for RA flares.

## 2022-05-18 NOTE — ASSESSMENT & PLAN NOTE
- he has radiographic evidence of gouty erosion, specifically at his R 1st MCP and L 1st MTP joints. Pt denied Hx of podagra or kidney stones. No evidence of tophi on exam.  - uric acid now at goal of <6.0. Cont Allopurinol 400 mg daily (pt stated he has 300 mg and 100 mg tablets).

## 2022-05-18 NOTE — ASSESSMENT & PLAN NOTE
- discussed his mild NELLY, pt denied any recent NSAID use. Repeat renal function now. - d/w pt that SSZ can cause live function abnormality, bone marrow toxicity, nausea and GI s/e. Discussed need to check safety labs 4 wks after starting SSZ followed by safety labs Q3mo. - will make referral to Ophthalmology for baseline eye exam for HCQ toxicity monitoring at next f/u visit.

## 2022-05-19 NOTE — RESULT ENCOUNTER NOTE
Please let pt know his kidney function went back to normal. Avoid NSAIDs. Needs to get repeat labs 4 wks after starting SSZ.

## 2022-07-15 NOTE — PROGRESS NOTES
evidence of gouty erosion, specifically at his R 1st MCP and L 1st MTP joints. Pt denied Hx of podagra or kidney stones. No evidence of tophi on exam.  - uric acid now at goal of <6.0. Cont Allopurinol 400 mg daily (pt stated he has 300 mg and 100 mg tablets). Orders:  -     allopurinol (ZYLOPRIM) 100 MG tablet; Take 1 tablet by mouth in the morning., Disp-90 tablet, R-0Normal  -     C-Reactive Protein; Future  -     Uric Acid; Future  -     allopurinol (ZYLOPRIM) 300 MG tablet; Take 1 tablet by mouth in the morning., Disp-90 tablet, R-0Normal  3. High risk medication use  Assessment & Plan:  - he will obtain safety labs 4 wks after starting SSZ. - will make referral to Ophthalmology for baseline eye exam for HCQ toxicity monitoring at next f/u visit. Orders:  -     Hepatic Function Panel; Future  -     CBC with Auto Differential; Future  -     Creatinine; Future     Return in about 2 months (around 9/20/2022) for lab result discussion and treatment plan, medication monitoring. NOTE: This report is transcribed by using voice recognition software dragon. Every effort is made to ensure accuracy; however, inadvertent computerized  transcription errors may be present. SUBJECTIVE:  Past medical/surgical history, medications and allergies are reviewed and updated as appropriate. Interval Hx:   Pt states he started SSZ one wk ago, he is currently taking 500 mg BID and is tolerating this dose w/o difficulty. He reports compliance w/ HCQ and MTX. He reports overall improvement in his joint Sx since starting immunosuppression. He cont to have arthralgias in his MCP joints however. Morning stiffness lasts <30 min now. He denies recent gout attacks or kidney stones. He reports compliance w/ Allopurinol 400 mg daily.     ROS:  Constitutional: denies chronic fatigue, fever/chills, night sweats, unintentional weight loss  Integumentary: denies photosensitivity, rash, patchy alopecia, or Sx of Raynaud's phenomenon  Eyes: denies dry eyes, redness or pain, visual disturbance, or floaters  Nose: denies nasal ulcers or recurrent sinusitis  Oral cavity: +chronic dry mouth, denies oral ulcers  Cardiovascular: denies CP, palpitations, Hx of pericardial effusion or pericarditis  Respiratory: denies SOB, cough, hemoptysis, or pleurisy  Gastrointestinal: denies heart burn, dysphagia or esophageal dysmotility, denies change in bowel habits or Sx of IBD  Hematologic/Lymphatic: denies abnormal bruising or bleeding, denies Hx of blood clots, denies swollen LNs  Musculoskeletal:  refer to above HPI   Neurological: denies focal weakness, paresthesias/hyperesthesias or change in sensation, denies Hx of seizure, denies change in gait, balance, or memory    No Known Allergies    Past Medical History:        Diagnosis Date    Anemia     BPH (benign prostatic hyperplasia)     Diabetes mellitus (HCC)     GERD (gastroesophageal reflux disease)     Gout     Hyperlipidemia     Osteoarthritis        Past Surgical History:    No past surgical history on file. Medications:    Current Outpatient Medications   Medication Sig Dispense Refill    allopurinol (ZYLOPRIM) 100 MG tablet Take 1 tablet by mouth in the morning. 90 tablet 0    folic acid (FOLVITE) 1 MG tablet Take 1 tablet by mouth in the morning. 90 tablet 0    hydroxychloroquine (PLAQUENIL) 200 MG tablet Take 1 tablet by mouth in the morning and 1 tablet before bedtime. 180 tablet 0    methotrexate (RHEUMATREX) 2.5 MG chemo tablet Take 5 tablets by mouth once a week 60 tablet 0    sulfaSALAzine (AZULFIDINE) 500 MG tablet Take 2 tablets by mouth in the morning and 2 tablets before bedtime. 360 tablet 0    allopurinol (ZYLOPRIM) 300 MG tablet Take 1 tablet by mouth in the morning.  90 tablet 0    metFORMIN (GLUCOPHAGE-XR) 500 MG extended release tablet TAKE 1 TABLET EVERY NIGHT 90 tablet 1    Lancets MISC 1 each by Does not apply route daily Test as directed, Dispense according to insurance formulary 300 each 1    Blood Glucose Monitoring Suppl KIT 1 kit by Does not apply route daily Dispense according to insurance formulary 1 kit 0    blood glucose test strips (ASCENSIA AUTODISC VI;ONE TOUCH ULTRA TEST VI) strip 1 each by In Vitro route daily Test as directed,Dispense according to insurance formulary 300 each 1    atorvastatin (LIPITOR) 40 MG tablet Take 1 tablet by mouth daily 90 tablet 1    hydroCHLOROthiazide (MICROZIDE) 12.5 MG capsule Take 2 capsules by mouth daily 90 capsule 1    omeprazole (PRILOSEC) 20 MG delayed release capsule Take 1 capsule by mouth 2 times daily 90 capsule 1    tamsulosin (FLOMAX) 0.4 MG capsule Take 1 capsule by mouth daily 90 capsule 1    aspirin 81 MG EC tablet Take 81 mg by mouth daily      Cholecalciferol 50 MCG (2000 UT) TABS Take 2,000 Units by mouth daily      Ferrous Sulfate 324 MG TBEC Take 1 tablet by mouth daily (with breakfast)      Krill Oil 300 MG CAPS Take 300 mg by mouth nightly      Multiple Vitamins-Minerals (OCUVITE-LUTEIN) CAPS multivitamin Take 1 tablet by mouth daily      glucose monitoring (FREESTYLE FREEDOM) kit 1 kit by Does not apply route daily 1 kit 0     No current facility-administered medications for this visit.         OBJECTIVE:  Physical Exam:  /80 (Site: Left Upper Arm, Position: Sitting, Cuff Size: Medium Adult)   Pulse (!) 101   Ht 6' 3\" (1.905 m)   Wt 242 lb 9.6 oz (110 kg)   SpO2 96%   BMI 30.32 kg/m²     GEN: AAOx3, in NAD, well-appearing  HEAD: normocephalic, atraumatic  EYES: no injection or icterus  CVS: RRR  LUNGS: in no acute respiratory distress, CTAB  MSK:  Upper extremities:              Hands: b/l MCP joints w/ synovial thickening R MCP joints w/ mild swelling slightly TTP, b/l PIP joints w/ bony enlargement no synovitis NTTP, b/l DIP joints w/o significant swelling TTP, full fist formation w/ weak  strength, no evidence of sclerodactyly, calcinosis, digital ulcers or pitting              Wrist: R wrist joint w/ synovial proliferation and minimal synovitis NTTP, limited ROM              Elbow: no synovitis or bursitis, small soft tissue nodule in lateral aspect of the L elbow, FROM  Lower extremities:              Knees: no warmth or effusion present, FROM              Ankles: no synovitis, FROM, Achilles tendons w/o swelling or warmth NTTP              Feet: no toe swelling or pain or warmth on palpation w/ FROM, -MTP squeeze test b/l  INTEGUMENT: no tophaceous deposits, no rash or psoriatic lesions, no petechiae, bruises, or palpable purpura, no patchy alopecia, no nail pitting or periungual changes, no clubbing or digital ulcers    DATA:  Labs:   I personally reviewed interval labs and discussed w/ the pt in detail which showed:    Lab Results   Component Value Date    WBC 7.7 05/09/2022    HGB 14.5 05/09/2022    HCT 43.5 05/09/2022    MCV 93.9 05/09/2022     05/09/2022    LYMPHOPCT 21.4 05/09/2022    RBC 4.64 05/09/2022    MCH 31.4 05/09/2022    MCHC 33.4 05/09/2022    RDW 16.4 (H) 05/09/2022     Lab Results   Component Value Date     03/30/2022    K 4.1 03/30/2022     03/30/2022    CO2 23 03/30/2022    BUN 16 03/30/2022    CREATININE 1.1 05/18/2022    GLUCOSE 126 (H) 03/30/2022    CALCIUM 9.2 03/30/2022    PROT 6.8 05/09/2022    LABALBU 4.1 05/09/2022    BILITOT 0.7 05/09/2022    ALKPHOS 111 05/09/2022    AST 27 05/09/2022    ALT 33 05/09/2022    LABGLOM >60 05/18/2022    GFRAA >60 05/18/2022    AGRATIO 1.0 (L) 01/24/2022     Lab Results   Component Value Date    VITD25 38.5 03/30/2022     Vitamin D 1,25-dihydroxy wnl (3/30/22)    Lab Results   Component Value Date    C3 148.2 03/30/2022     Lab Results   Component Value Date    C4 33.2 03/30/2022     Lab Results   Component Value Date    ANTIDSDNAIGG 3 03/30/2022      Lab Results   Component Value Date    LABURIC 5.8 05/09/2022    LABURIC 6.3 03/30/2022   Urci acid 7.2 (3/25/20) --> 7.9 (8/7/20) --> 5.5 (2/4/21) --> 6.1 (8/4/21)    Lab Results   Component Value Date    CRP <3.0 05/09/2022    CRP <3.0 03/30/2022    CRP 12.3 (H) 01/24/2022     Lab Results   Component Value Date    SEDRATE 40 (H) 03/30/2022    SEDRATE 80 (H) 01/24/2022     No results found for: CKTOTAL     RF 15.0, CCP >300 (3/30/22)  REEMA 1:160 speckled pattern (3/30/22)  Positive RNP and SSA, rest of LADONNA panel negative (3/30/22)  Serum ACE 83 (3/30/22)  Negative hepatitis B surface Ag and core total Ab, hepatitis C Ab (3/30/22)    Imaging:  I personally reviewed interval imaging and discussed w/ the pt in detail which included:    PET CT (4/5/21): FINDINGS:   HEAD/NECK:   There is a focal hypermetabolic (maximum SUV 45.6) soft tissue lesion measuring 3 x 1.8 cm (series 3 image 72) surrounding the left aspect of the hyoid bone and extending into the left thyroid cartilage and pre-epiglottic fat. There are multiple small hypermetabolic lymph nodes in the bilateral lower neck. CHEST:   There are mildly enlarged bilateral axillary lymph nodes with preserved fatty fransisco, with the left axillary lymph node measuring a maximum SUV of 5.3. Moderate upper lung zone predominant centrilobular and paraseptal emphysema. Bandlike opacities at the lung bases, likely subsegmental atelectasis/scarring. No abnormal FDG uptake in the lungs, including in the 1.4 cm left upper lobe nodule. Mild coronary artery calcifications. ABDOMEN/PELVIS:   Mild cortical renal scarring. Scattered atherosclerotic calcifications throughout the aorta and its branches. Prostatomegaly. There are mildly hypermetabolic bilateral inguinal lymph nodes with preserved fatty fransisco. BONES/SOFT TISSUES:   There is symmetric increased FDG uptake in the bilateral glenohumeral joints, elbow joints, wrists, and hips, and at C1/dens, likely related to arthritis. The well circumscribed lucent lesion in the right humeral head does not demonstrate increased FDG uptake and is likely a benign lesion.  No abnormal FDG uptake in the skeleton. IMPRESSION:   1. 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 2.  Multiple small lymph nodes in the lower neck and mildly enlarged bilateral axillary lymph nodes are indeterminate. While they demonstrate increased FDG uptake, no suspicious morphological features are seen and they may be reactive or inflammatory. Recommend continued attention on follow-up or comparison with outside prior imaging. 3.  No abnormal FDG uptake in the lungs, including in the left upper lobe nodule. Consider continued chest CT follow up imaging. 4.  Polyarticular arthritis with FDG uptake, which may be inflammatory. X-rays (3/30/22): FINDINGS:     Right hand:   Periarticular erosions of the first MCP joint with mild subluxation. Periarticular erosions at the radiocarpal and ulnar carpal joints. Multiple bone cysts within the carpal joints are present. Left hand:   Periarticular erosions at the radiocarpal and intercarpal joints. Multiple bone cysts within the carpal joints are present. Erosive changes at the STT joint. Right foot:   No acute fracture or osseous destruction. There is no soft tissue swelling, joint subluxation, periarticular osteopenia, or erosions. There is mild first MTP osteoarthrosis. Os navicularis is present. Left foot:   There are periarticular erosive changes at the first MTP joint. There is no associated soft tissue swelling. No other acute inflammatory findings identified. Os navicularis is present. IMPRESSION:  Right hand:   1. Multifocal periarticular erosions at the first MCP joint and wrist can be seen with crystal deposition such as gout or other inflammatory arthritides. Left hand:   1. Multifocal periarticular erosions in the wrist can be seen with can be seen with crystal deposition such as gout or other inflammatory arthritides.    Right foot: 1. Mild first MTP osteoarthrosis. 2. No evidence of inflammatory or arthritis. Left foot:   1. Erosive changes at the first MTP joint can be seen with gout given the common location however other inflammatory arthritides included in the differential.       I independently reviewed above x-rays, agree with findings of periarticular erosions in the R 1st MCP, b/l carpal and L 1st MTP joints. R 1st MCP erosion w/ punched out appearance suspicious for gout. CXR (4/13/22): FINDINGS:     The lungs are clear. The cardiomediastinal contours are within normal limits There are no visible pleural abnormalities There are no acute osseous abnormalities. IMPRESSION:  No acute cardiopulmonary disease    Above results were discussed w/ the pt in detail during today's visit.

## 2022-07-20 ENCOUNTER — OFFICE VISIT (OUTPATIENT)
Dept: RHEUMATOLOGY | Age: 72
End: 2022-07-20
Payer: COMMERCIAL

## 2022-07-20 VITALS
OXYGEN SATURATION: 96 % | BODY MASS INDEX: 30.16 KG/M2 | DIASTOLIC BLOOD PRESSURE: 80 MMHG | WEIGHT: 242.6 LBS | HEART RATE: 101 BPM | HEIGHT: 75 IN | SYSTOLIC BLOOD PRESSURE: 110 MMHG

## 2022-07-20 DIAGNOSIS — Z79.899 HIGH RISK MEDICATION USE: ICD-10-CM

## 2022-07-20 DIAGNOSIS — M05.79 SEROPOSITIVE RHEUMATOID ARTHRITIS OF MULTIPLE JOINTS (HCC): Primary | ICD-10-CM

## 2022-07-20 DIAGNOSIS — M10.00 IDIOPATHIC GOUT, UNSPECIFIED CHRONICITY, UNSPECIFIED SITE: ICD-10-CM

## 2022-07-20 PROCEDURE — 1123F ACP DISCUSS/DSCN MKR DOCD: CPT | Performed by: INTERNAL MEDICINE

## 2022-07-20 PROCEDURE — 99214 OFFICE O/P EST MOD 30 MIN: CPT | Performed by: INTERNAL MEDICINE

## 2022-07-20 RX ORDER — FOLIC ACID 1 MG/1
1 TABLET ORAL DAILY
Qty: 90 TABLET | Refills: 0 | Status: SHIPPED | OUTPATIENT
Start: 2022-07-20 | End: 2022-07-26 | Stop reason: SDUPTHER

## 2022-07-20 RX ORDER — SULFASALAZINE 500 MG/1
1000 TABLET ORAL 2 TIMES DAILY
Qty: 360 TABLET | Refills: 0 | Status: SHIPPED | OUTPATIENT
Start: 2022-07-20 | End: 2022-10-18

## 2022-07-20 RX ORDER — ALLOPURINOL 300 MG/1
300 TABLET ORAL DAILY
Qty: 90 TABLET | Refills: 0 | Status: SHIPPED | OUTPATIENT
Start: 2022-07-20 | End: 2022-08-16 | Stop reason: SDUPTHER

## 2022-07-20 RX ORDER — NAPROXEN 500 MG/1
TABLET ORAL
COMMUNITY
Start: 2021-09-27 | End: 2022-07-25 | Stop reason: ALTCHOICE

## 2022-07-20 RX ORDER — HYDROXYCHLOROQUINE SULFATE 200 MG/1
200 TABLET, FILM COATED ORAL 2 TIMES DAILY
Qty: 180 TABLET | Refills: 0 | Status: SHIPPED | OUTPATIENT
Start: 2022-07-20 | End: 2022-08-16 | Stop reason: SDUPTHER

## 2022-07-20 RX ORDER — ALLOPURINOL 100 MG/1
100 TABLET ORAL DAILY
Qty: 90 TABLET | Refills: 0 | Status: SHIPPED | OUTPATIENT
Start: 2022-07-20 | End: 2022-08-16 | Stop reason: SDUPTHER

## 2022-07-20 NOTE — ASSESSMENT & PLAN NOTE
- synovitis significantly improved since starting immunosuppression. He had minimal synovitis on exam today. - pt declined biologic DMARDs d/t Hx of melanoma. As such we will maximize his conventional DMARDs. - he will titrate SSZ dose up to 1000 mg BID. Cont  mg BID and MTX 12.5 mg PO wkly. - he will take low dose Prednisone taper for RA flares.

## 2022-07-20 NOTE — ASSESSMENT & PLAN NOTE
- he will obtain safety labs 4 wks after starting SSZ. - will make referral to Ophthalmology for baseline eye exam for HCQ toxicity monitoring at next f/u visit.

## 2022-07-21 DIAGNOSIS — E11.9 TYPE 2 DIABETES MELLITUS WITHOUT COMPLICATION, WITHOUT LONG-TERM CURRENT USE OF INSULIN (HCC): ICD-10-CM

## 2022-07-22 RX ORDER — ATORVASTATIN CALCIUM 40 MG/1
TABLET, FILM COATED ORAL
Qty: 90 TABLET | Refills: 1 | OUTPATIENT
Start: 2022-07-22

## 2022-07-25 ENCOUNTER — OFFICE VISIT (OUTPATIENT)
Dept: PRIMARY CARE CLINIC | Age: 72
End: 2022-07-25
Payer: COMMERCIAL

## 2022-07-25 VITALS
HEART RATE: 91 BPM | DIASTOLIC BLOOD PRESSURE: 82 MMHG | WEIGHT: 243 LBS | HEIGHT: 75 IN | BODY MASS INDEX: 30.21 KG/M2 | SYSTOLIC BLOOD PRESSURE: 128 MMHG | OXYGEN SATURATION: 97 %

## 2022-07-25 DIAGNOSIS — M05.79 SEROPOSITIVE RHEUMATOID ARTHRITIS OF MULTIPLE JOINTS (HCC): ICD-10-CM

## 2022-07-25 DIAGNOSIS — M10.00 IDIOPATHIC GOUT, UNSPECIFIED CHRONICITY, UNSPECIFIED SITE: ICD-10-CM

## 2022-07-25 DIAGNOSIS — E11.9 TYPE 2 DIABETES MELLITUS WITHOUT COMPLICATION, WITHOUT LONG-TERM CURRENT USE OF INSULIN (HCC): Primary | ICD-10-CM

## 2022-07-25 PROBLEM — D69.6 THROMBOCYTOPENIA, UNSPECIFIED (HCC): Status: RESOLVED | Noted: 2022-05-18 | Resolved: 2022-07-25

## 2022-07-25 LAB
A/G RATIO: 1.9 (ref 1.1–2.2)
ALBUMIN SERPL-MCNC: 4.4 G/DL (ref 3.4–5)
ALP BLD-CCNC: 123 U/L (ref 40–129)
ALT SERPL-CCNC: 35 U/L (ref 10–40)
ANION GAP SERPL CALCULATED.3IONS-SCNC: 15 MMOL/L (ref 3–16)
AST SERPL-CCNC: 37 U/L (ref 15–37)
BASOPHILS ABSOLUTE: 0.1 K/UL (ref 0–0.2)
BASOPHILS RELATIVE PERCENT: 1.3 %
BILIRUB SERPL-MCNC: 0.6 MG/DL (ref 0–1)
BUN BLDV-MCNC: 20 MG/DL (ref 7–20)
C-REACTIVE PROTEIN: <3 MG/L (ref 0–5.1)
CALCIUM SERPL-MCNC: 9.6 MG/DL (ref 8.3–10.6)
CHLORIDE BLD-SCNC: 102 MMOL/L (ref 99–110)
CHOLESTEROL, TOTAL: 104 MG/DL (ref 0–199)
CO2: 23 MMOL/L (ref 21–32)
CREAT SERPL-MCNC: 1.2 MG/DL (ref 0.8–1.3)
EOSINOPHILS ABSOLUTE: 0.1 K/UL (ref 0–0.6)
EOSINOPHILS RELATIVE PERCENT: 1.9 %
GFR AFRICAN AMERICAN: >60
GFR NON-AFRICAN AMERICAN: 59
GLUCOSE BLD-MCNC: 127 MG/DL (ref 70–99)
HBA1C MFR BLD: 6.4 %
HCT VFR BLD CALC: 41.3 % (ref 40.5–52.5)
HDLC SERPL-MCNC: 37 MG/DL (ref 40–60)
HEMOGLOBIN: 14.3 G/DL (ref 13.5–17.5)
LDL CHOLESTEROL CALCULATED: 38 MG/DL
LYMPHOCYTES ABSOLUTE: 1.4 K/UL (ref 1–5.1)
LYMPHOCYTES RELATIVE PERCENT: 22 %
MCH RBC QN AUTO: 33.1 PG (ref 26–34)
MCHC RBC AUTO-ENTMCNC: 34.6 G/DL (ref 31–36)
MCV RBC AUTO: 95.7 FL (ref 80–100)
MONOCYTES ABSOLUTE: 0.4 K/UL (ref 0–1.3)
MONOCYTES RELATIVE PERCENT: 6.5 %
NEUTROPHILS ABSOLUTE: 4.4 K/UL (ref 1.7–7.7)
NEUTROPHILS RELATIVE PERCENT: 68.3 %
PDW BLD-RTO: 16.9 % (ref 12.4–15.4)
PLATELET # BLD: 189 K/UL (ref 135–450)
PMV BLD AUTO: 8.6 FL (ref 5–10.5)
POTASSIUM SERPL-SCNC: 4.3 MMOL/L (ref 3.5–5.1)
RBC # BLD: 4.32 M/UL (ref 4.2–5.9)
SODIUM BLD-SCNC: 140 MMOL/L (ref 136–145)
TOTAL PROTEIN: 6.7 G/DL (ref 6.4–8.2)
TRIGL SERPL-MCNC: 146 MG/DL (ref 0–150)
URIC ACID, SERUM: 5 MG/DL (ref 3.5–7.2)
VLDLC SERPL CALC-MCNC: 29 MG/DL
WBC # BLD: 6.5 K/UL (ref 4–11)

## 2022-07-25 PROCEDURE — 3044F HG A1C LEVEL LT 7.0%: CPT | Performed by: NURSE PRACTITIONER

## 2022-07-25 PROCEDURE — 1123F ACP DISCUSS/DSCN MKR DOCD: CPT | Performed by: NURSE PRACTITIONER

## 2022-07-25 PROCEDURE — 99214 OFFICE O/P EST MOD 30 MIN: CPT | Performed by: NURSE PRACTITIONER

## 2022-07-25 PROCEDURE — 83036 HEMOGLOBIN GLYCOSYLATED A1C: CPT | Performed by: NURSE PRACTITIONER

## 2022-07-25 ASSESSMENT — ENCOUNTER SYMPTOMS
CONSTIPATION: 1
DIARRHEA: 0
COUGH: 0
SHORTNESS OF BREATH: 0

## 2022-07-25 NOTE — PROGRESS NOTES
PROGRESS NOTE  Date of Service:  7/25/2022  Address: [place] [address] [facility]  [address] [place] [address] [address] [place] [address]  Dept: [phone]  Loc: [phone]    Subjective:      Patient ID: [id number]  Cuate Mark is a 66 y.o. male    HPI: Patient presents for diabetes check and refill. Patient checks sugars at home, runs  in morning, 210 after a meal. Denies lows. Patient recently had skin cancer removed from back and neck. [facility] and found he had a dilated [facility]. Patient has some constipation, denies diarrhea. Last bowel movement was yesterday. Patiet has increased urination especially at night, 3 times a night. Review of Systems   Constitutional:  Negative for fatigue and fever. HENT:  Positive for postnasal drip. Respiratory:  Negative for cough and shortness of breath. Cardiovascular:  Negative for chest pain, palpitations and leg swelling. Gastrointestinal:  Positive for constipation. Negative for diarrhea. Genitourinary:  Positive for frequency. Neurological:  Negative for dizziness, light-headedness and numbness. Psychiatric/Behavioral:  The patient is not nervous/anxious. Objective:   Physical Exam  Constitutional:       Appearance: Normal appearance. HENT:      Head: Normocephalic. Mouth/Throat:      Mouth: Mucous membranes are dry. Cardiovascular:      Rate and Rhythm: Normal rate and regular rhythm. Pulses: Normal pulses. Heart sounds: Normal heart sounds. Pulmonary:      Effort: Pulmonary effort is normal.      Breath sounds: Normal breath sounds. Musculoskeletal:         General: Normal range of motion. Skin:     General: Skin is warm. Capillary Refill: Capillary refill takes less than 2 seconds. Neurological:      General: No focal deficit present. Mental Status: He is alert and oriented to person, place, and time.    Psychiatric:         Mood and Affect: Mood normal.         Behavior: Behavior normal.         Thought Content: Thought content normal.         Judgment: Judgment normal.       Plan:   1. Type 2 diabetes mellitus without complication, without long-term current use of insulin ([address]- Patient's A1C well controlled on this visit. Patient advised to continue monitoring nutrition but does not need to check sugars daily unless symptomatic.   -     POCT glycosylated hemoglobin (Hb A1C)  -     Comprehensive Metabolic Panel  -     Lipid Panel  2. Seropositive rheumatoid arthritis of multiple joints (HCC)-patient sees rheumatology for this will get blood work drawn from rheumatology  -     C-Reactive Protein  -     CBC with Auto Differential  -     Comprehensive Metabolic Panel  3. Idiopathic gout, unspecified chronicity, unspecified site-patient sees rheumatology for this we will get blood work done for rheumatology  -     Uric Acid    Patient's been battling some constipation recommend patient try MiraLAX every day to help with constipation if he starts getting loose stool to every other day patient agrees with plan. If constipation persist patient will call office. Patient denies any blood in stool. Electronically signed by JOSE Baird CNP on 7/25/22 at 9:06 AM EDT     This dictation was generated by voice recognition computer software. Although all attempts are made to edit the dictation for accuracy, there may be errors in the transcription that were not intended.

## 2022-07-26 DIAGNOSIS — M05.79 SEROPOSITIVE RHEUMATOID ARTHRITIS OF MULTIPLE JOINTS (HCC): ICD-10-CM

## 2022-07-26 DIAGNOSIS — K21.9 GASTROESOPHAGEAL REFLUX DISEASE, UNSPECIFIED WHETHER ESOPHAGITIS PRESENT: ICD-10-CM

## 2022-07-26 DIAGNOSIS — I10 HYPERTENSION, UNSPECIFIED TYPE: ICD-10-CM

## 2022-07-27 RX ORDER — OMEPRAZOLE 20 MG/1
20 CAPSULE, DELAYED RELEASE ORAL 2 TIMES DAILY
Qty: 90 CAPSULE | Refills: 1 | Status: SHIPPED | OUTPATIENT
Start: 2022-07-27 | End: 2022-08-16 | Stop reason: SDUPTHER

## 2022-07-27 RX ORDER — HYDROCHLOROTHIAZIDE 12.5 MG/1
25 CAPSULE, GELATIN COATED ORAL DAILY
Qty: 90 CAPSULE | Refills: 1 | Status: SHIPPED | OUTPATIENT
Start: 2022-07-27 | End: 2022-08-16 | Stop reason: SDUPTHER

## 2022-07-27 NOTE — TELEPHONE ENCOUNTER
Medication:   Requested Prescriptions     Pending Prescriptions Disp Refills    hydroCHLOROthiazide (MICROZIDE) 12.5 MG capsule 90 capsule 1     Sig: Take 2 capsules by mouth in the morning. omeprazole (PRILOSEC) 20 MG delayed release capsule 90 capsule 1     Sig: Take 1 capsule by mouth in the morning and 1 capsule before bedtime. Last Filled:  Both: 2/7/2022    Patient Phone Number: [phone] (home)     Last appt: 7/25/2022   Next appt: Visit date not found    Last OARRS: No flowsheet data found.

## 2022-07-29 RX ORDER — FOLIC ACID 1 MG/1
1 TABLET ORAL DAILY
Qty: 90 TABLET | Refills: 0 | Status: SHIPPED | OUTPATIENT
Start: 2022-07-29 | End: 2022-08-16 | Stop reason: SDUPTHER

## 2022-08-01 DIAGNOSIS — M05.79 SEROPOSITIVE RHEUMATOID ARTHRITIS OF MULTIPLE JOINTS (HCC): ICD-10-CM

## 2022-08-01 RX ORDER — FOLIC ACID 1 MG/1
TABLET ORAL
Qty: 90 TABLET | Refills: 0 | OUTPATIENT
Start: 2022-08-01

## 2022-08-16 DIAGNOSIS — E11.9 TYPE 2 DIABETES MELLITUS WITHOUT COMPLICATION, WITHOUT LONG-TERM CURRENT USE OF INSULIN (HCC): ICD-10-CM

## 2022-08-16 DIAGNOSIS — M10.00 IDIOPATHIC GOUT, UNSPECIFIED CHRONICITY, UNSPECIFIED SITE: ICD-10-CM

## 2022-08-16 DIAGNOSIS — I10 HYPERTENSION, UNSPECIFIED TYPE: ICD-10-CM

## 2022-08-16 DIAGNOSIS — M05.79 SEROPOSITIVE RHEUMATOID ARTHRITIS OF MULTIPLE JOINTS (HCC): ICD-10-CM

## 2022-08-16 DIAGNOSIS — K21.9 GASTROESOPHAGEAL REFLUX DISEASE, UNSPECIFIED WHETHER ESOPHAGITIS PRESENT: ICD-10-CM

## 2022-08-16 DIAGNOSIS — N39.44 NOCTURNAL ENURESIS: ICD-10-CM

## 2022-08-17 RX ORDER — ALLOPURINOL 300 MG/1
300 TABLET ORAL DAILY
Qty: 90 TABLET | Refills: 0 | Status: SHIPPED | OUTPATIENT
Start: 2022-08-17

## 2022-08-17 RX ORDER — FOLIC ACID 1 MG/1
1 TABLET ORAL DAILY
Qty: 90 TABLET | Refills: 0 | Status: SHIPPED | OUTPATIENT
Start: 2022-08-17 | End: 2022-11-15

## 2022-08-17 RX ORDER — ALLOPURINOL 100 MG/1
100 TABLET ORAL DAILY
Qty: 90 TABLET | Refills: 0 | Status: SHIPPED | OUTPATIENT
Start: 2022-08-17 | End: 2022-11-15

## 2022-08-17 RX ORDER — HYDROCHLOROTHIAZIDE 12.5 MG/1
25 CAPSULE, GELATIN COATED ORAL DAILY
Qty: 90 CAPSULE | Refills: 1 | Status: SHIPPED | OUTPATIENT
Start: 2022-08-17

## 2022-08-17 RX ORDER — HYDROXYCHLOROQUINE SULFATE 200 MG/1
200 TABLET, FILM COATED ORAL 2 TIMES DAILY
Qty: 180 TABLET | Refills: 0 | Status: SHIPPED | OUTPATIENT
Start: 2022-08-17 | End: 2022-11-15

## 2022-08-17 RX ORDER — TAMSULOSIN HYDROCHLORIDE 0.4 MG/1
0.4 CAPSULE ORAL DAILY
Qty: 90 CAPSULE | Refills: 1 | Status: SHIPPED | OUTPATIENT
Start: 2022-08-17

## 2022-08-17 RX ORDER — ATORVASTATIN CALCIUM 40 MG/1
40 TABLET, FILM COATED ORAL DAILY
Qty: 90 TABLET | Refills: 1 | Status: SHIPPED | OUTPATIENT
Start: 2022-08-17

## 2022-08-17 RX ORDER — OMEPRAZOLE 20 MG/1
20 CAPSULE, DELAYED RELEASE ORAL 2 TIMES DAILY
Qty: 90 CAPSULE | Refills: 1 | Status: SHIPPED | OUTPATIENT
Start: 2022-08-17

## 2022-08-17 RX ORDER — METFORMIN HYDROCHLORIDE 500 MG/1
TABLET, EXTENDED RELEASE ORAL
Qty: 90 TABLET | Refills: 1 | Status: SHIPPED | OUTPATIENT
Start: 2022-08-17

## 2022-08-17 NOTE — TELEPHONE ENCOUNTER
Medication:   Requested Prescriptions     Pending Prescriptions Disp Refills    atorvastatin (LIPITOR) 40 MG tablet 90 tablet 1     Sig: Take 1 tablet by mouth daily    tamsulosin (FLOMAX) 0.4 MG capsule 90 capsule 1     Sig: Take 1 capsule by mouth daily    metFORMIN (GLUCOPHAGE-XR) 500 MG extended release tablet 90 tablet 1     Sig: TAKE 1 TABLET EVERY NIGHT    hydroCHLOROthiazide (MICROZIDE) 12.5 MG capsule 90 capsule 1     Sig: Take 2 capsules by mouth daily    omeprazole (PRILOSEC) 20 MG delayed release capsule 90 capsule 1     Sig: Take 1 capsule by mouth 2 times daily        Last Filled:      Patient Phone Number: [phone] (home)     Last appt: 7/25/2022   Next appt: 1/23/2023    Last OARRS: No flowsheet data found.

## 2022-09-26 DIAGNOSIS — E11.9 TYPE 2 DIABETES MELLITUS WITHOUT COMPLICATION, WITHOUT LONG-TERM CURRENT USE OF INSULIN (HCC): ICD-10-CM

## 2022-09-26 RX ORDER — METFORMIN HYDROCHLORIDE 500 MG/1
TABLET, EXTENDED RELEASE ORAL
Qty: 90 TABLET | Refills: 1 | OUTPATIENT
Start: 2022-09-26

## 2022-10-16 DIAGNOSIS — M10.00 IDIOPATHIC GOUT, UNSPECIFIED CHRONICITY, UNSPECIFIED SITE: ICD-10-CM

## 2022-10-17 RX ORDER — ALLOPURINOL 300 MG/1
TABLET ORAL
Qty: 90 TABLET | Refills: 0 | OUTPATIENT
Start: 2022-10-17

## 2022-10-26 DIAGNOSIS — M05.79 SEROPOSITIVE RHEUMATOID ARTHRITIS OF MULTIPLE JOINTS (HCC): ICD-10-CM

## 2022-10-26 RX ORDER — METHOTREXATE 2.5 MG/1
TABLET ORAL
Qty: 60 TABLET | Refills: 0 | OUTPATIENT
Start: 2022-10-26

## 2022-11-10 DIAGNOSIS — M05.79 SEROPOSITIVE RHEUMATOID ARTHRITIS OF MULTIPLE JOINTS (HCC): ICD-10-CM

## 2022-11-10 DIAGNOSIS — M10.00 IDIOPATHIC GOUT, UNSPECIFIED CHRONICITY, UNSPECIFIED SITE: ICD-10-CM

## 2022-11-10 RX ORDER — HYDROXYCHLOROQUINE SULFATE 200 MG/1
TABLET, FILM COATED ORAL
Qty: 180 TABLET | Refills: 0 | OUTPATIENT
Start: 2022-11-10

## 2022-11-10 RX ORDER — ALLOPURINOL 100 MG/1
TABLET ORAL
Qty: 90 TABLET | Refills: 0 | OUTPATIENT
Start: 2022-11-10

## 2022-11-18 ENCOUNTER — OFFICE VISIT (OUTPATIENT)
Dept: PRIMARY CARE CLINIC | Age: 72
End: 2022-11-18
Payer: MEDICARE

## 2022-11-18 ENCOUNTER — TELEPHONE (OUTPATIENT)
Dept: ORTHOPEDIC SURGERY | Age: 72
End: 2022-11-18

## 2022-11-18 VITALS
WEIGHT: 247.8 LBS | OXYGEN SATURATION: 99 % | TEMPERATURE: 97.4 F | SYSTOLIC BLOOD PRESSURE: 120 MMHG | DIASTOLIC BLOOD PRESSURE: 68 MMHG | HEART RATE: 86 BPM | BODY MASS INDEX: 30.97 KG/M2

## 2022-11-18 DIAGNOSIS — N40.1 BENIGN PROSTATIC HYPERPLASIA WITH NOCTURIA: ICD-10-CM

## 2022-11-18 DIAGNOSIS — R35.1 BENIGN PROSTATIC HYPERPLASIA WITH NOCTURIA: ICD-10-CM

## 2022-11-18 DIAGNOSIS — E11.9 TYPE 2 DIABETES MELLITUS WITHOUT COMPLICATION, WITHOUT LONG-TERM CURRENT USE OF INSULIN (HCC): Primary | ICD-10-CM
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
## 2022-11-18 LAB — HBA1C MFR BLD: 6.3 %

## 2022-11-18 PROCEDURE — 99214 OFFICE O/P EST MOD 30 MIN: CPT | Performed by: NURSE PRACTITIONER

## 2022-11-18 PROCEDURE — 1123F ACP DISCUSS/DSCN MKR DOCD: CPT | Performed by: NURSE PRACTITIONER

## 2022-11-18 PROCEDURE — 3044F HG A1C LEVEL LT 7.0%: CPT | Performed by: NURSE PRACTITIONER

## 2022-11-18 PROCEDURE — 83036 HEMOGLOBIN GLYCOSYLATED A1C: CPT | Performed by: NURSE PRACTITIONER

## 2022-11-18 RX ORDER — LORATADINE 10 MG/1
10 TABLET, ORALLY DISINTEGRATING ORAL DAILY
COMMUNITY

## 2022-11-18 NOTE — TELEPHONE ENCOUNTER
Appointment Request     Patient requesting earlier appointment: Yes  Appointment offered to patient: yes  Patient Contact Number: [phone]    PATIENT IS REQ APPT REGARDING INJURY TO NAIL ON LITTLE FINGER, RIPPED PARTIALLY OFF. HE IS BEING REFERRED BY HIS PCP. DID OFFER APPT [email or web address] WITH DR Basia Bhatti, BUT PATIENT HAS A CARDIOLOGY APPOINTMENT. CAN GO TO [facility], , OR [facility]. PLEASE RETURN CALL TO ABOVE NUMBER.

## 2022-11-18 NOTE — PROGRESS NOTES
PROGRESS NOTE  Date of Service:  11/18/2022  Address: [place] [address] [facility]  [address] [place] [address] [address] [facility]  Dept: [phone]  Loc: [phone]    Subjective:      Patient ID: [id number]  Annalee Lynn is a 67 y.o. male    HPI: patient is here for his 6 month follow up for diabetes, patient does go to the [place], he has been to dermatology for melanoma, but being followed by [place]. Patient said he is seeing [place] [facility] as well due to an area on his neck. Patient said that he is going to get biopsied again, they are not sure what the area is. Patient has lymph nodes removed per patient that is negative for melanoma. Patient is checking sugars every once and a while. He said it is ok. Patient is not having any numbness in his feet. Patient hurt his left pinky finger, patient said that he was lifting a trailer and it is ripped his fingernail. Review of Systems   Genitourinary:  Positive for frequency. All other systems reviewed and are negative. Objective:   Physical Exam  Vitals reviewed. Constitutional:       Appearance: Normal appearance. Cardiovascular:      Rate and Rhythm: Normal rate and regular rhythm. Pulses: Normal pulses. Heart sounds: Normal heart sounds. Pulmonary:      Effort: Pulmonary effort is normal.      Breath sounds: Normal breath sounds. Musculoskeletal:      Comments: Left small fingernail injured dried blood painful to touch   Skin:     General: Skin is warm. Capillary Refill: Capillary refill takes less than 2 seconds. Neurological:      General: No focal deficit present. Mental Status: He is alert and oriented to person, place, and time. Psychiatric:         Mood and Affect: Mood normal.         Behavior: Behavior normal.         Thought Content: Thought content normal.         Judgment: Judgment normal.       Plan:   1.  Type 2 diabetes mellitus without complication, without long-term current use of insulin ([facility] [address] patient's blood sugars are well controlled. We will continue current medications. Patient does see the [address] we will get blood work completed they are uploaded to chart.  -     POCT glycosylated hemoglobin (Hb A1C)  2. Benign prostatic hyperplasia with nocturia patient's been getting up several times a night to urinate patient is on Flomax will refer patient to urology for evaluation.  -     Naty Aggarwal MD, Urology, [facility]  3. Injury by nail, initial encounter patient injured fingernail will refer patient to Ortho surgery for evaluation patient okay to wait until Monday if signs or symptoms worsen infection or pain worsens will go to ER.  -     Charo Burnette MD, Hand Surgery (Hand, Wrist, Upper Extremity), [facility]             Electronically signed by JOSE Parks CNP on 11/18/22 at 1:00 PM EST     This dictation was generated by voice recognition computer software. Although all attempts are made to edit the dictation for accuracy, there may be errors in the transcription that were not intended.

## 2022-11-19 SDOH — HEALTH STABILITY: PHYSICAL HEALTH: ON AVERAGE, HOW MANY MINUTES DO YOU ENGAGE IN EXERCISE AT THIS LEVEL?: 30 MIN

## 2022-11-19 SDOH — HEALTH STABILITY: PHYSICAL HEALTH: ON AVERAGE, HOW MANY DAYS PER WEEK DO YOU ENGAGE IN MODERATE TO STRENUOUS EXERCISE (LIKE A BRISK WALK)?: 1 DAY

## 2022-11-19 ASSESSMENT — SOCIAL DETERMINANTS OF HEALTH (SDOH)
WITHIN THE LAST YEAR, HAVE TO BEEN RAPED OR FORCED TO HAVE ANY KIND OF SEXUAL ACTIVITY BY YOUR PARTNER OR EX-PARTNER?: NO
WITHIN THE LAST YEAR, HAVE YOU BEEN KICKED, HIT, SLAPPED, OR OTHERWISE PHYSICALLY HURT BY YOUR PARTNER OR EX-PARTNER?: NO
WITHIN THE LAST YEAR, HAVE YOU BEEN AFRAID OF YOUR PARTNER OR EX-PARTNER?: NO
WITHIN THE LAST YEAR, HAVE YOU BEEN HUMILIATED OR EMOTIONALLY ABUSED IN OTHER WAYS BY YOUR PARTNER OR EX-PARTNER?: NO

## 2022-11-21 ENCOUNTER — PATIENT MESSAGE (OUTPATIENT)
Dept: PRIMARY CARE CLINIC | Age: 72
End: 2022-11-21

## 2022-11-21 ENCOUNTER — OFFICE VISIT (OUTPATIENT)
Dept: ORTHOPEDIC SURGERY | Age: 72
End: 2022-11-21
Payer: MEDICARE

## 2022-11-21 VITALS — BODY MASS INDEX: 30.71 KG/M2 | HEIGHT: 75 IN | WEIGHT: 247 LBS | RESPIRATION RATE: 16 BRPM

## 2022-11-21 DIAGNOSIS — S69.92XA INJURY OF NAIL BED OF FINGER OF LEFT HAND, INITIAL ENCOUNTER: Primary | ICD-10-CM

## 2022-11-21 PROCEDURE — 1123F ACP DISCUSS/DSCN MKR DOCD: CPT | Performed by: PHYSICIAN ASSISTANT

## 2022-11-21 PROCEDURE — 99204 OFFICE O/P NEW MOD 45 MIN: CPT | Performed by: PHYSICIAN ASSISTANT

## 2022-11-21 NOTE — TELEPHONE ENCOUNTER
From: Mynor Cheek  To: Whitney See  Sent: 11/21/2022 3:58 PM EST  Subject: pre surgery    need your exam from friday sent to or put on my chart for surgery tomorrow 11/22

## 2022-11-21 NOTE — PROGRESS NOTES

## 2022-11-21 NOTE — PROGRESS NOTES
Mr. Marcie Freeman is a 67 y.o. right handed man  who is seen today in Hand Surgical Consultation at the request of JOSE Franco CNP. He is seen today regarding an injury occurring on November 17th, 2022. He reports injuring his left Small Finger, having  caught it on a trailer door. At the time of injury, there was not clear dislocation or malposition of the finger. He was seen for Emergency evaluation elsewhere, radiographs were not obtained & he has not been immobilized. By report, his skin injury was not repaired after the wound was thoroughly cleansed. The nail structures were involved in the injury and did not require repair at the time. He reports mild pain located in the Distal aspect of the Small Finger, no tenderness of the wrist or elbow. He notes today, no neurologic symptoms in the Small Finger. He states that at the time of injury his nail plate \"flipped up at an 85 degree angle\". Symptoms show no change over time. I have today reviewed with Marcie Freeman the clinically relevant, past medical history, medications, allergies,  family history, social history, and Review Of Systems & I have documented any details relevant to today's presenting complaints in my history above. Mr. Janice Alanis self-reported past medical history, medications, allergies,  family history, social history, and Review Of Systems have been scanned into the chart under the \"Media\" tab. Physical Exam:  Mr. Janice Alanis most recent vitals:  Vitals  Resp: 16  Height: 6' 3\" (190.5 cm)  Weight: 247 lb (112 kg)    He is well nourished, oriented to person, place & time. He demonstrates appropriate mood and affect as well as normal gait and station. Skin: There is  no evidence of a laceration to the small finger on the left. There is a mild amount of dried blood noted around the eponychial skin of the nail. The nail plate is present.  The ulnar aspect of the eponychial skin has either been pushed back proximally or is lying under the nail plate. No other digits show sign of skin injury bilaterally. Digital range of motion is  limited by mild pain  in the injured digit on the Left, normal on the Right. FDS function is Intact, FDP function is Intact, common extensor function is Intact. Wrist range of motion is Full bilaterally. Sensation is subjectively normal in the zone of injury, objectively normal.  All other digits demonstrate normal sensation bilaterally. Vascular examination reveals normal, good capillary refill, and good color bilaterally. There is mild acute ecchymosis. Swelling is mild in the Small Finger, all other digits demonstrate no evidence of swelling bilaterally. There is no evidence of gross joint instability bilaterally. Muscular strength is clinically appropriate bilaterally. Maximal pain is elicited with palpation of the distal region of the Small Finger about the Left, Small Finger, DIP Joint. The base of the hand & wrist are not tender to palpation. There is a 0 degree angular deformity and no clinical evidence of  mal-rotation of the injured digit. Impression:  Mr. Severa Key has sustained recent left Small Finger nail injury. He  presents requesting further treatment. Plan:    I have discussed with Mr. Severa Key the various treatment options for treatment of left  Small Finger nail injury  We discussed the options of Conservative treatment, and Surgical revision and repair of the injured structures. I have explained the pre-, rainer- ane post-operative considerations to him. He has voiced an understanding of the other options available to him. I have explained the complications, limitations, expectations, alternatives, & risks of his chosen treatment. We discussed the possibility of residual symptoms as may be related to surgical treatment including the possiblities of:  Infection, poor healing of skin, bone, nail structures, persistant deformity, persistant pain, limitation of motion, future arthritic symptoms & the possible need for further treatment. We also specifically discussed risks related to any surgical procedure including: bleeding, infection, scar formation, & possible need for repeat operation. He understood our discussion and was comfortable with his decision; he was provided with appropriate expectations. I had an extensive discussion with Mr. Magdaleno Billy  and any family members present regarding the natural history, etiology, and long term consequences of this problem. I have outlined a treatment plan with them and, in my opinion, surgical intervention is indicated at this time. I have discussed with them the potential complications, limitations, expectations, alternatives, and risks of the procedure. He has had full opportunity to ask their questions. I have answered them all to his satisfaction. I feel that the patient and any present family members do understand our discussion today and he has provided informed consent for nail plate removal with possible nail bed repair  of the injured digit. He is specifically instructed to contact the office between now & his scheduled appointment if he has concerns related to his condition  He is welcome to call for an appointment sooner if he has any additional concerns or questions. I have also discussed with Mr. Magdaleno Billy the other treatment options available to him for this condition. We have today selected to proceed with Surgical treatment. He has voiced and  understanding that there are other less aggressive treatment options which are available in this situation, albeit possibly less efficacious or durable, and he is comfortable with the plan that he has chosen. Mr. Magdaleno Billy has been given a full verbal list of instructions and precautions related to his present condition. I have asked him to followup with me in the office at the prescribed time.  He is also specifically requested to call or return to the office sooner if his symptoms change or worsen prior to the next scheduled appointment.

## 2022-11-21 NOTE — LETTER
[facility] [place] [facility]\"" SURGERY  Surgery Scheduling Form:  DEMOGRAPHICS:                                                                                                              .    Patient Name:  Katiuska Antonio  Patient :  1950   Patient SS#:  [id number]    Patient Phone:  [phone] (home)  Alt. Patient Phone:    Patient Address:  [address]    PCP:  JOSE Brandt CNP  Insurance:   Payer/Plan Subscr  Sex Relation Sub. Ins. ID Effective Group Num   1. Bryson Pascual 1950 Male Self [id number] 1/1/18 [id number]                                   [address] [address]     DIAGNOSIS & PROCEDURE:                                                                                            .    Diagnosis:   left Small Finger Traumatic Nail Injury W45. 0XXA  Operation:  left Small Finger Nail Plate Removal with possible Nail Ablation  Location:  [facility]  Surgeon:  Sixto Fleming    SCHEDULING INFORMATION:                                                                                         .    Surgeon's Scheduling Instruction: tomorrow     RN Post-op Appt:  [] Yes   [x] No  Preferred Thursday:   [] Yes   [] No    Requested Date:  22 OR Time: 3:00pm  Patient Arrival Time:    OR Time Required:  15  Minutes  Anesthesia: MAC  Equipment:  None  Mini C-Arm:  YES   Standard C-Arm:  No  Status:  outpatient  PAT Required:  Yes  Comments:                      Bea Aguilar. Leena Eaton MD 22, 2:04 PM      BILLING INFORMATION:                                                                                                    .    Procedure:       CPT Code Modifier   left Small Finger Nail Plate Removal with possible Nail Bed Repair      .

## 2022-11-21 NOTE — LETTER
CONSENT TO OPERATION  AND/OR OTHER PROCEDURE(S)          PATIENT : Patricia Purcell   YOB: 1950      DATE : 11/21/22          1. I request and consent that Dr. Katlyn Acosta. Pedro Luis Jenkins,  and/or his associates or assistants perform an operation and/or procedures on the above patient at  [place] [address], to treat the condition(s) which appear indicated by the diagnostic studies already performed. I have been told that in general terms the nature, purpose and reasonable expectations of the operation and/or procedure(s) are:         left Small Finger Nail Plate Removal with possible Nail Bed Repair        2. It has been explained to me by the informing physician that during the course of the operation and/or procedure(s) unforeseen conditions may be revealed that necessitate an extension of the original operation and/or procedure(s) or different operation and/or procedures than those set forth in Paragraph 1. I therefore authorize and request that my physician and/or his associates or assistants perform such operations and/or procedures as are necessary and desirable in the exercise of professional judgment. The authority granted under this Paragraph 2 shall extend to all conditions that require treatment and are known to my physician at the time the operation is commenced. 3. I have been made aware by the informing physician of certain risks and consequences that are associated with the operation and/or procedure(s) described in Paragraph 1, the reasonable alternative methods or treatment, the possible consequences, the possibility that the operation and/or procedure(s) may be unsuccessful and the possibility of complications.   I understand the reasonably known risks to be:      - Bleeding  - Infection  - Poor Healing  - Possible Damage to Nerve, Vessel, Tendon/Muscle or Bone  - Need for further Treatment/Surgery  - Stiffness  - Pain  - Residual or Recurrent Symptoms  - Anesthetic and/or Medical Risks  - We have discussed the specific limitations and risks of hospital and/or office based treatment at this time due to the COVID-19 pandemic                I have been counseled about the risks of idris Covid-19 in the rainer-operative and post-operative periods related to this procedure. I have been made aware that idris Covid-19 around the time of a surgical procedure may worsen my prognosis for recovering from the virus and lend to a higher morbidity and or mortality risk. With this knowledge, I have requested to proceed with the procedure as scheduled. 4. I have also been informed by the informing physician that there are other risks from both known and unknown causes that are attendant to the performance of any surgical procedure. I am aware that the practice of medicine and surgery is not an exact science, and that no guarantees have been made to me concerning the results of the operation and/or procedure(s). 5. I   CONSENT / REFUSE CONSENT  (strike the phrase that does not apply) to the taking of photographs before, during and/or after the operation or procedure for scientific/educational purposes. 6. I consent to the administration of anesthesia and to the use of such anesthetics as may be deemed advisable by the anesthesiologist who has been engaged by me or my physician.     7. I certify that I have read and understand the above consent to operation and/or other procedure(s); that the explanations therein referred to were made to me by the informing physician in advance of my signing this consent; that all blanks or statements requiring insertion or completion were filled in and inapplicable paragraphs, if any, were stricken before I signed; and that all questions asked by me about the operation and/or procedure(s) which I have consented to have been fully answered in a satisfactory manner.                                 _______________________ 11/21/22                              Witness     Signature Of Patient         Date        Bakari Mart                                                 Informing Physician                                           Signature of Informing Physician                              If patient is unable to sign or is a minor, complete one of the following:    (A)  Patient is a minor   years of age. (B)  Patient is unable to sign because: The undersigned represents that he or she is duly authorized to execute this consent for and on behalf of the above named patient. Witness               o  Parent  o  Guardian   o  Spouse       o  Other (specify)                                           Patient Name: Subhash Russo  Patient YOB: 1950  Dr. MATIAS WHEELER [facility]' Return To Work Policy  Regarding your ability to return to work after surgery or injury, Dr. MATIAS WHEELER [facility] will not state that any patient is off of work or cannot work at all. He will place you on restrictions after your surgical procedure or injury. Depending on the details of your particular situation, Dr. MATIAS WHEELER [facility] may state that you will have either light use or no use of your hand for a specific number of weeks. It is your obligation to communicate with your employer regarding your restrictions. It is your employer's decision as to whether they will accommodate your restrictions (i.e. allow you to come to work in your restricted capacity) or to not allow you to return to work under your restrictions. Dr. MATIAS WHEELER [facility] does not participate in making this decision and cannot influence your employer regarding their decision. If you do not communicate your restrictions to your employer, or if you do not present to work as you are scheduled to, Dr. MATIAS WHEELER [facility] will not provide an 'excuse' to explain your absence.   A doctors note, or official forms (BWC, FMLA, etc.) will be filled out, upon request, to indicate your date of surgery and your restrictions as stated above. Dr. Tonya Galvan' Narcotic Policy  Patients will only be prescribed narcotics after surgical procedures or significant injury. Not all procedures cause pain great enough to require Narcotics and thus, not all patients will receive prescriptions after surgical procedures or injuries. Narcotics are never prescribed for chronic conditions. Narcotics are never prescribed for use longer than one week at a time. Refills are only granted in unusual circumstances and only at Dr. Mert Rowe discretion. Patients who are receiving narcotic medication from another physician or who are under pain management contracts will not be given a prescription for narcotics for any reason. Surgery Arrival Time:  You have been advised of the START TIME for your surgery as well as the ARRIVAL TIME at which you need to arrive at the surgery facility. Please understand that there is a certain amount of preparation which takes place at the surgery facility prior to the start of your surgery. If you arrive later than your scheduled ARRIVAL TIME, it may be necessary to cancel your surgery for that day and reschedule your procedure due to lack of adequate time for pre-surgery preparations. Thank you for being on time for your arrival.    I have read the above policies and understand that by agreeing to proceed with treatment by Dr. Renan Hernandez and his team, that I am agreeing to abide by these policies.   Patient Name:  Sheila Arroyo    Patient Signature:  _____________________________    Conor Baxter Date:   11/21/22

## 2022-11-22 ENCOUNTER — ANESTHESIA (OUTPATIENT)
Dept: OPERATING ROOM | Age: 72
End: 2022-11-22
Payer: MEDICARE

## 2022-11-22 ENCOUNTER — ANESTHESIA EVENT (OUTPATIENT)
Dept: OPERATING ROOM | Age: 72
End: 2022-11-22
Payer: MEDICARE

## 2022-11-22 ENCOUNTER — TELEPHONE (OUTPATIENT)
Dept: ORTHOPEDIC SURGERY | Age: 72
End: 2022-11-22

## 2022-11-22 ENCOUNTER — HOSPITAL ENCOUNTER (OUTPATIENT)
Age: 72
Setting detail: OUTPATIENT SURGERY
Discharge: HOME | End: 2022-11-22
Attending: ORTHOPAEDIC SURGERY | Admitting: ORTHOPAEDIC SURGERY
Payer: MEDICARE

## 2022-11-22 VITALS
HEIGHT: 72 IN | TEMPERATURE: 97 F | DIASTOLIC BLOOD PRESSURE: 64 MMHG | WEIGHT: 240 LBS | OXYGEN SATURATION: 98 % | RESPIRATION RATE: 15 BRPM | HEART RATE: 74 BPM | BODY MASS INDEX: 32.51 KG/M2 | SYSTOLIC BLOOD PRESSURE: 118 MMHG

## 2022-11-22 LAB
GLUCOSE BLD-MCNC: 104 MG/DL (ref 70–99)
GLUCOSE BLD-MCNC: 97 MG/DL (ref 70–99)
PERFORMED ON: ABNORMAL
PERFORMED ON: NORMAL

## 2022-11-22 PROCEDURE — 3700000001 HC ADD 15 MINUTES (ANESTHESIA): Performed by: ORTHOPAEDIC SURGERY

## 2022-11-22 PROCEDURE — 7100000010 HC PHASE II RECOVERY - FIRST 15 MIN: Performed by: ORTHOPAEDIC SURGERY

## 2022-11-22 PROCEDURE — 7100000011 HC PHASE II RECOVERY - ADDTL 15 MIN: Performed by: ORTHOPAEDIC SURGERY

## 2022-11-22 PROCEDURE — 2580000003 HC RX 258: Performed by: NURSE ANESTHETIST, CERTIFIED REGISTERED

## 2022-11-22 PROCEDURE — 2580000003 HC RX 258: Performed by: ORTHOPAEDIC SURGERY

## 2022-11-22 PROCEDURE — 2709999900 HC NON-CHARGEABLE SUPPLY: Performed by: ORTHOPAEDIC SURGERY

## 2022-11-22 PROCEDURE — 3600000012 HC SURGERY LEVEL 2 ADDTL 15MIN: Performed by: ORTHOPAEDIC SURGERY

## 2022-11-22 PROCEDURE — A4217 STERILE WATER/SALINE, 500 ML: HCPCS | Performed by: ORTHOPAEDIC SURGERY

## 2022-11-22 PROCEDURE — 3600000002 HC SURGERY LEVEL 2 BASE: Performed by: ORTHOPAEDIC SURGERY

## 2022-11-22 PROCEDURE — 3700000000 HC ANESTHESIA ATTENDED CARE: Performed by: ORTHOPAEDIC SURGERY

## 2022-11-22 PROCEDURE — 6360000002 HC RX W HCPCS: Performed by: NURSE ANESTHETIST, CERTIFIED REGISTERED

## 2022-11-22 PROCEDURE — 2500000003 HC RX 250 WO HCPCS: Performed by: NURSE ANESTHETIST, CERTIFIED REGISTERED

## 2022-11-22 PROCEDURE — 2500000003 HC RX 250 WO HCPCS: Performed by: ORTHOPAEDIC SURGERY

## 2022-11-22 RX ORDER — DIPHENHYDRAMINE HYDROCHLORIDE 50 MG/ML
12.5 INJECTION INTRAMUSCULAR; INTRAVENOUS
Status: DISCONTINUED | OUTPATIENT
Start: 2022-11-22 | End: 2022-11-22 | Stop reason: HOSPADM

## 2022-11-22 RX ORDER — PROPOFOL 10 MG/ML
INJECTION, EMULSION INTRAVENOUS PRN
Status: DISCONTINUED | OUTPATIENT
Start: 2022-11-22 | End: 2022-11-22 | Stop reason: SDUPTHER

## 2022-11-22 RX ORDER — SODIUM CHLORIDE 0.9 % (FLUSH) 0.9 %
5-40 SYRINGE (ML) INJECTION PRN
Status: DISCONTINUED | OUTPATIENT
Start: 2022-11-22 | End: 2022-11-22 | Stop reason: HOSPADM

## 2022-11-22 RX ORDER — MEPERIDINE HYDROCHLORIDE 50 MG/ML
12.5 INJECTION INTRAMUSCULAR; INTRAVENOUS; SUBCUTANEOUS EVERY 5 MIN PRN
Status: DISCONTINUED | OUTPATIENT
Start: 2022-11-22 | End: 2022-11-22 | Stop reason: HOSPADM

## 2022-11-22 RX ORDER — LABETALOL HYDROCHLORIDE 5 MG/ML
5 INJECTION, SOLUTION INTRAVENOUS EVERY 10 MIN PRN
Status: DISCONTINUED | OUTPATIENT
Start: 2022-11-22 | End: 2022-11-22 | Stop reason: HOSPADM

## 2022-11-22 RX ORDER — SODIUM CHLORIDE 0.9 % (FLUSH) 0.9 %
5-40 SYRINGE (ML) INJECTION EVERY 12 HOURS SCHEDULED
Status: DISCONTINUED | OUTPATIENT
Start: 2022-11-22 | End: 2022-11-22 | Stop reason: HOSPADM

## 2022-11-22 RX ORDER — LIDOCAINE HYDROCHLORIDE 20 MG/ML
INJECTION, SOLUTION INFILTRATION; PERINEURAL PRN
Status: DISCONTINUED | OUTPATIENT
Start: 2022-11-22 | End: 2022-11-22 | Stop reason: SDUPTHER

## 2022-11-22 RX ORDER — OXYCODONE HYDROCHLORIDE 5 MG/1
5 TABLET ORAL PRN
Status: DISCONTINUED | OUTPATIENT
Start: 2022-11-22 | End: 2022-11-22 | Stop reason: HOSPADM

## 2022-11-22 RX ORDER — ONDANSETRON 2 MG/ML
4 INJECTION INTRAMUSCULAR; INTRAVENOUS
Status: DISCONTINUED | OUTPATIENT
Start: 2022-11-22 | End: 2022-11-22 | Stop reason: HOSPADM

## 2022-11-22 RX ORDER — SODIUM CHLORIDE, SODIUM LACTATE, POTASSIUM CHLORIDE, CALCIUM CHLORIDE 600; 310; 30; 20 MG/100ML; MG/100ML; MG/100ML; MG/100ML
INJECTION, SOLUTION INTRAVENOUS CONTINUOUS PRN
Status: DISCONTINUED | OUTPATIENT
Start: 2022-11-22 | End: 2022-11-22 | Stop reason: SDUPTHER

## 2022-11-22 RX ORDER — OXYCODONE HYDROCHLORIDE 5 MG/1
10 TABLET ORAL PRN
Status: DISCONTINUED | OUTPATIENT
Start: 2022-11-22 | End: 2022-11-22 | Stop reason: HOSPADM

## 2022-11-22 RX ORDER — SODIUM CHLORIDE 9 MG/ML
INJECTION, SOLUTION INTRAVENOUS PRN
Status: DISCONTINUED | OUTPATIENT
Start: 2022-11-22 | End: 2022-11-22 | Stop reason: HOSPADM

## 2022-11-22 RX ORDER — MAGNESIUM HYDROXIDE 1200 MG/15ML
LIQUID ORAL CONTINUOUS PRN
Status: COMPLETED | OUTPATIENT
Start: 2022-11-22 | End: 2022-11-22

## 2022-11-22 RX ADMIN — SODIUM CHLORIDE, SODIUM LACTATE, POTASSIUM CHLORIDE, AND CALCIUM CHLORIDE: .6; .31; .03; .02 INJECTION, SOLUTION INTRAVENOUS at 15:13

## 2022-11-22 RX ADMIN — LIDOCAINE HYDROCHLORIDE 100 MG: 20 INJECTION, SOLUTION INFILTRATION; PERINEURAL at 15:15

## 2022-11-22 RX ADMIN — PROPOFOL 200 MG: 10 INJECTION, EMULSION INTRAVENOUS at 15:15

## 2022-11-22 ASSESSMENT — PAIN - FUNCTIONAL ASSESSMENT: PAIN_FUNCTIONAL_ASSESSMENT: 0-10

## 2022-11-22 NOTE — DISCHARGE INSTRUCTIONS
Post-Operative Instructions    Hand & Wrist Surgery:    Keep hand strictly elevated with fingers above eye-level to control swelling and pain. NOTE: If hand is allowed to swell, pain may be very difficult to control. Keep hand and bandage clean and dry. Do not change or unwrap bandage. Please leave bandage in place until your follow-up appointment. Maintain finger motion by fully straightening and fully bending fingers. You should not use your operated hand for any tasks. NO LIFTING, CARRYING OR HEAVY USE. You may use an arm sling as you wish, but this is not adequate elevation to control swelling and pain. You may take the prescribed pain medication as needed  OR   You may take over the counter medication (Tylenol, Advil, Aleve, etc.) but you should not take both together unless otherwise instructed. Please call the office at [phone]  in 24 - 48 hours to schedule a follow up appointment after surgery. Please call the office at [phone]  if you have any questions or problems. Jazmin Chen MD            ANESTHESIA DISCHARGE INSTRUCTIONS    You are under the influence of drugs- do not drink alcohol, drive a car, operate machinery(such as power tools, kitchen appliances, etc), sign legal documents, or make any important decisions for 24 hours (or while on pain medications). Children should not ride bikes or [place] or play on gym sets  for 24 hours after surgery. A responsible adult should be with you for 24 hours. Rest at home today- increase activity as tolerated. Progress slowly to a regular diet unless your physician has instructed you otherwise. Drink plenty of water. CALL YOUR DOCTOR IF YOU:  Have moderate to severe nausea or vomiting AND are unable to hold down fluids or prescribed medications. Have bright red bloody drainage from your dressing that won't stop oozing.   Do not get relief with your pain medication    NORMAL (POSSIBLE) SIDE EFFECTS FROM ANESTHESIA:     Confusion, temporary memory loss, delayed reaction times in the first 24 hours  Lightheadedness, dizziness, difficulty focusing, blurred vision  Nausea/vomiting can happen  Shivering, feeling cold, sore throat, cough and muscle aches should stop within 24-48 hours  Trouble urinating - call your surgeon if it has been more than 8 hrs  Bruising or soreness at the IV site - call if it remains red, firm or there is drainage             FEMALES OF CHILDBEARING AGE WHO ARE TAKING BIRTH CONTROL PILLS:  You may have received a medication during your procedure that interferes with the   actions of birth control pills (Bridion or Emend). Use some other kind of birth control in addition to your pills, like a condom, for 1 month after your procedure to prevent unwanted pregnancy. The following instructions are to be followed if you have a known history or diagnosis of sleep apnea: For all sleep apnea patients:  ? Sleep on your side or sitting up in a chair whenever possible, especially the first 24 hours after surgery. ? Use only medicines prescribed by your doctor. ? Do not drink alcohol. ? If you have a dental device to assist you while at rest, use it at all times for the first 24 hours. For patients using CPAP machines:  ? Use your CPAP machine during all periods of sleep as usual.  ? Use your CPAP machine during all periods of daytime rest while on pain medicines. ** Follow up with your primary care doctor for continued care. IF YOU DO NOT TAKE ALL OF YOUR NARCOTIC PAIN MEDICATION, please dispose of them responsibly. There are drop off boxes in the Emergency Departments 24/7 at both [facility] and [place]. If these locations are not convenient, other options for discarding them can be found at:  [email or web address]    Hospital or office staff may NOT accept any medications to drop off in the cabinet for you. What is a Surgical Site Infection or  (SSI)?         A surgical site infection (SSI) is an infection that occurs after surgery in the part of the body where the surgery took place. Most patients who have surgery do not develop an infection. However, infections can develop in about 1-3 cases for every 100 patients who have had surgery. Our goal is for you to NOT experience any complications and be completely satisfied with your care! However, some signs or symptoms to look for and report immediately to your doctor are:   1. Fever above 101 degrees    2. Redness and increasing pain around the area  where you had surgery   3. Drainage of cloudy fluid or pus coming from the surgical area    Some of the things we/ you can do to prevent SSI's are:   1. Clean hands with soap and water or an alcohol-based hand rub before and after caring for the operative area. This occurs the day of surgery and for the next 2 weeks. 2.Sometimes you receive an appropriate antibiotic within 60 minutes before your surgery or take one for several days after surgery depending on your surgeon's instructions and/or the type of surgery you are having. 3. Family and/or friends who visit you should NOT touch the surgical wound or dressings until advised by your surgeon. 4. Be sure to elevate and decrease the swelling after your surgery to help prevent infection. 5. If you are a diabetic, you need to closely monitor your blood sugar levels and report any significant increases or changes to your surgeon to help promote the healing process.

## 2022-11-22 NOTE — ANESTHESIA PRE PROCEDURE
Department of Anesthesiology  Preprocedure Note       Name:  Nabila Blanton   Age:  67 y.o.  :  1950                                          MRN:  [id number]         Date:  2022      Surgeon: Lynsey Shrestha):  Milagro Singleton MD    Procedure: Procedure(s):  LEFT SMALL FINGER NAIL PLATE REMOVAL WITH POSSIBLE NAIL ABLATION    Medications prior to admission:   Prior to Admission medications    Medication Sig Start Date End Date Taking?  Authorizing Provider   loratadine (CLARITIN REDITABS) 10 MG dissolvable tablet Take 10 mg by mouth daily    Historical Provider, MD   atorvastatin (LIPITOR) 40 MG tablet Take 1 tablet by mouth daily 22   JOSE Kaba CNP   tamsulosin [facility]) 0.4 MG capsule Take 1 capsule by mouth daily 22   JOSE Kaba CNP   metFORMIN (GLUCOPHAGE-XR) 500 MG extended release tablet TAKE 1 TABLET EVERY NIGHT 22   JOSE Kaba CNP   hydroCHLOROthiazide (MICROZIDE) 12.5 MG capsule Take 2 capsules by mouth daily 22   JOSE Kaba CNP   omeprazole (PRILOSEC) 20 MG delayed release capsule Take 1 capsule by mouth 2 times daily 22   JOSE Kaba CNP   allopurinol (ZYLOPRIM) 100 MG tablet Take 1 tablet by mouth daily 8/17/22 11/15/22  Sameer Barksdale MD   allopurinol (ZYLOPRIM) 300 MG tablet Take 1 tablet by mouth daily 22   Sameer Barksdale MD   Lancets MISC 1 each by Does not apply route daily Test as directed, Dispense according to insurance formulary  Patient not taking: Reported on 2022   JOSE Kaba CNP   Blood Glucose Monitoring Suppl KIT 1 kit by Does not apply route daily Dispense according to insurance formulary  Patient not taking: Reported on 2022   JOSE Kaba CNP   blood glucose test strips (ASCENSIA AUTODISC VI;ONE TOUCH ULTRA TEST VI) strip 1 each by In Vitro route daily Test as directed,Dispense according to insurance formulary  Patient not taking: Reported on 2022   Huang Mckeon APRN - CNP   aspirin 81 MG EC tablet Take 81 mg by mouth daily    Historical Provider, MD   Cholecalciferol 50 MCG (2000) TABS Take 2,000 Units by mouth daily  Patient not taking: Reported on 2022    Historical Provider, MD   Ferrous Sulfate 324 MG TBEC Take 1 tablet by mouth daily (with breakfast)    Historical Provider, MD Monae Million Oil 300 MG CAPS Take 300 mg by mouth nightly    Historical Provider, MD   Multiple Vitamins-Minerals (OCUVITE-LUTEIN) CAPS multivitamin Take 1 tablet by mouth daily    Historical Provider, MD       Current medications:    No current facility-administered medications for this encounter.        Allergies:  No Known Allergies    Problem List:    Patient Active Problem List   Diagnosis Code    Idiopathic gout M10.00    Encounter for therapeutic drug monitoring Z51.81    Nodule of upper lobe of left lung R91.1    High risk medication use Z79.899    Seropositive rheumatoid arthritis of multiple joints (HCC) M05.79       Past Medical History:        Diagnosis Date    Anemia     BPH (benign prostatic hyperplasia)     Diabetes mellitus ([facility] [address]     GERD (gastroesophageal reflux disease)     Gout     Hyperlipidemia     Osteoarthritis        Past Surgical History:        Procedure Laterality Date    HEMORRHOID SURGERY      SKIN CANCER EXCISION         Social History:    Social History     Tobacco Use    Smoking status: Former     Packs/day: 1.00     Years: 15.00     Pack years: 15.00     Types: Cigarettes     Start date: 1968     Quit date: 2000     Years since quittin.8    Smokeless tobacco: Never   Substance Use Topics    Alcohol use: Not Currently                                Counseling given: Not Answered      Vital Signs (Current):   Vitals:    22 1547   Weight: 240 lb (108.9 kg)   Height: 6' (1.829 m)                                              BP Readings from Last 3 Encounters:   11/18/22 120/68   07/25/22 128/82   07/20/22 110/80       NPO Status:                                                                                 BMI:   Wt Readings from Last 3 Encounters:   11/21/22 240 lb (108.9 kg)   11/21/22 247 lb (112 kg)   11/18/22 247 lb 12.8 oz (112.4 kg)     Body mass index is 32.55 kg/m². CBC:   Lab Results   Component Value Date/Time    WBC 6.5 07/25/2022 09:29 AM    RBC 4.32 07/25/2022 09:29 AM    HGB 14.3 07/25/2022 09:29 AM    HCT 41.3 07/25/2022 09:29 AM    MCV 95.7 07/25/2022 09:29 AM    RDW 16.9 07/25/2022 09:29 AM     07/25/2022 09:29 AM       CMP:   Lab Results   Component Value Date/Time     07/25/2022 09:29 AM    K 4.3 07/25/2022 09:29 AM     07/25/2022 09:29 AM    CO2 23 07/25/2022 09:29 AM    BUN 20 07/25/2022 09:29 AM    CREATININE 1.2 07/25/2022 09:29 AM    GFRAA >60 07/25/2022 09:29 AM    AGRATIO 1.9 07/25/2022 09:29 AM    LABGLOM 59 07/25/2022 09:29 AM    GLUCOSE 127 07/25/2022 09:29 AM    PROT 6.7 07/25/2022 09:29 AM    CALCIUM 9.6 07/25/2022 09:29 AM    BILITOT 0.6 07/25/2022 09:29 AM    ALKPHOS 123 07/25/2022 09:29 AM    AST 37 07/25/2022 09:29 AM    ALT 35 07/25/2022 09:29 AM       POC Tests: No results for input(s): POCGLU, POCNA, POCK, POCCL, POCBUN, POCHEMO, POCHCT in the last 72 hours.     Coags: No results found for: PROTIME, INR, APTT    HCG (If Applicable): No results found for: PREGTESTUR, PREGSERUM, HCG, HCGQUANT     ABGs: No results found for: PHART, PO2ART, SNY9LMQ, CXQ7BPW, BEART, K6VZYIQW     Type & Screen (If Applicable):  No results found for: LABABO, LABRH    Drug/Infectious Status (If Applicable):  No results found for: HIV, HEPCAB    COVID-19 Screening (If Applicable): No results found for: COVID19        Anesthesia Evaluation  Patient summary reviewed no history of anesthetic complications:   Airway: Mallampati: II  TM distance: >3 FB   Neck ROM: full  Mouth opening: > = 3 FB   Dental: normal exam   (+) upper dentures and lower dentures      Pulmonary:normal exam  breath sounds clear to auscultation      (-) COPD, asthma and sleep apnea                           Cardiovascular:  Exercise tolerance: good (>4 METS),   (+) hypertension:,     (-) CAD,  angina and  SOLANO      Rhythm: regular  Rate: normal                    Neuro/Psych:      (-) seizures and TIA           GI/Hepatic/Renal:   (+) GERD: well controlled,      (-) liver disease and no renal disease       Endo/Other:    (+) Diabetes, : arthritis:., .                 Abdominal:             Vascular: negative vascular ROS. Other Findings:           Anesthesia Plan      MAC     ASA 2     (I discussed with the patient the risks and benefits of PIV, anesthesia, IV Narcotics, PACU. All questions were answered the patient agrees with the plan and wishes to proceed)  Induction: intravenous.                             Angel Haq MD   11/22/2022

## 2022-11-22 NOTE — PROGRESS NOTES
Wife to bedside updated no questions. Discharge instructions reviewed with patient and responsible adult. Discharge instructions signed and copy given with no additional questions. Patient to be discharged home with belongings. Patient awake alert ready to home.

## 2022-11-22 NOTE — PROGRESS NOTES
Thedora Ines    Age 67 y.o.    male    1950    CHIQUITA [id number]    11/22/2022  Arrival Time_____________  OR Time____________[address]     Procedure(s):  LEFT SMALL FINGER NAIL PLATE REMOVAL WITH POSSIBLE NAIL ABLATION                      Monitor Anesthesia Care    Surgeon(s):  Hamzah Salgado, MD       Phone [phone] ([place])     [address]  Cell         Work  _____________________________________________________________________  _____________________________________________________________________  _____________________________________________________________________  _____________________________________________________________________  _____________________________________________________________________    PCP _____________________________ Phone_________________     H&P__________________Bringing      Chart            Epic   DOS      Called________  EKG__________________Bringing      Chart            Epic   DOS      Called________  LAB__________________ Bringing      Chart            Epic   DOS      Called________  Cardiac Clearance_______Bringing      Chart            Epic      DOS      Called________    Cardiologist________________________ Phone___________________________    ? Zoroastrianism concerns / Waiver on Chart            [employer]________________  ? Pre-op Instructions Given [place]          _________________________________  ? Directions to Surgery Center                          _________________________________  ? Transportation Home_______________      __________________________________  ?  Crutches/Walker__________________        __________________________________    ________Pre-op Orders   _______Transcribed    _______Wt.  ________Pharmacy          _______SCD  ______VTE     ______TED Merlinda Paling  _______  Surgery Consent    _______  Anesthesia Consent         COVID DATE______________LOCATION________________ RESULT__________

## 2022-11-22 NOTE — H&P
Pre-operative Update of H&P:    I  have seen & examined Mr. Katiuska Antonio related solely to his hand and upper extremity conditions, prior to the scheduled procedure on the date of his surgery. The indications for the planned surgical procedure & and his upper-extremity condition are unchanged.

## 2022-11-22 NOTE — TELEPHONE ENCOUNTER
CPT: 26759, 41763  BODY PART: left finger  STATUS: outpatient  LOCATION: Audrey Andino: [facility]    Per Dwain ''R'' , [address] [facility]

## 2022-11-22 NOTE — ANESTHESIA POSTPROCEDURE EVALUATION
Department of Anesthesiology  Postprocedure Note    Patient: Mynor Cheek  MRN: [id number]  YOB: 1950  Date of evaluation: 11/22/2022      Procedure Summary     Date: 11/22/22 Room / Location: [address] [place] [address] [facility] Jillian Lynch [address] / [facility]    Anesthesia Start: 3630 Anesthesia Stop: 7113    Procedure: LEFT SMALL FINGER NAIL PLATE REMOVAL (Left) Diagnosis:       Wound of finger from metal nail, initial encounter      (LEFT SMALL FINGER TRAUMATIC NAIL INJURY)    Surgeons: Trish Dacosta MD Responsible Provider: Cecille Espinoza MD    Anesthesia Type: MAC ASA Status: 2          Anesthesia Type: No value filed.     Audra Phase I: Audra Score: 10    Audra Phase II: Audra Score: 10      Anesthesia Post Evaluation    Comments: Postoperative Anesthesia Note    Name:    Mynor Cheek  MRN:      [id number]    Patient Vitals in the past 12 hrs:  11/22/22 1540, BP:112/66, Pulse:77, Resp:15, SpO2:95 %  11/22/22 1535, BP:101/60, Temp:97 °F (36.1 °C), Pulse:73, Resp:16, SpO2:96 %  11/22/22 1530, BP:(!) 99/59, Pulse:78, Resp:15, SpO2:95 %  11/22/22 1529, BP:86/62, Temp:97 °F (36.1 °C), Temp src:Temporal, Pulse:80, Resp:16, SpO2:95 %  11/22/22 1343, BP:115/70, Temp:97.3 °F (36.3 °C), Pulse:85, Resp:17, SpO2:97 %, Height:6' (1.829 m), Weight:240 lb (108.9 kg)     LABS:    CBC  Lab Results       Component                Value               Date/Time                  WBC                      6.5                 07/25/2022 09:29 AM        HGB                      14.3                07/25/2022 09:29 AM        HCT                      41.3                07/25/2022 09:29 AM        PLT                      189                 07/25/2022 09:29 AM   RENAL  Lab Results       Component                Value               Date/Time                  NA                       140                 07/25/2022 09:29 AM        K                        4.3                 07/25/2022 09:29 AM        CL                       102 07/25/2022 09:29 AM        CO2                      23                  07/25/2022 09:29 AM        BUN                      20                  07/25/2022 09:29 AM        CREATININE               1.2                 07/25/2022 09:29 AM        GLUCOSE                  127 (H)             07/25/2022 09:29 AM   COAGS  No results found for: PROTIME, INR, APTT    Intake & Output:  @99YRZV@    Nausea & Vomiting:  No    Level of Consciousness:  Awake    Pain Assessment:  Adequate analgesia    Anesthesia Complications:  No apparent anesthetic complications    SUMMARY      Vital signs stable  OK to discharge from Stage I post anesthesia care.   Care transferred from Anesthesiology department on discharge from perioperative area

## 2022-11-22 NOTE — OP NOTE
OPERATIVE REPORT          Patient:  Magdaleno Billy    YOB: 1950  Date of Service:  11/22/2022    Location:  [facility]      Preoperative Diagnosis:  Left Small Finger Nail plate partial displacement    Postoperative Diagnosis:  Same. Procedure:  Removal  of Left Small Finger nail plate . Surgeon:    Vikki Hatch. Kiko Rice MD    Surgical Assistant:    MARGARITA Amaya Assistant    Anesthesia:  MAC/TIVA    Blood Loss:  Minimal.     Complications:  None. Tourniquet Time:  0 minutes. Indications:  Mr. Magdaleno Billy is a 67y.o. year old male with a history of traumatic Left Small Finger Nail plate partial displacement. As his symptoms have not responded to conservative treatment, I have discussed with him preoperatively the complications, limitations, expectations, alternatives and risks of the surgical care which he understood. All of his questions have been fully answered, and Mr. Magdaleno Billy has provided written informed consent to proceed. After written consent was obtained and the proper operative site was identified and marked, Mr. Magdaleno Billy was brought to the operating room, placed in the supine position on the operating room table with the Left arm extended upon a hand table. The planned anesthesia was administered by the anesthesia service and the Left upper extremity was prepped and draped in the usual sterile fashion. A blunt freer elevator was used to elevate the nail plate from the sterile matrix. There matrix was inspected and found to be intact. Final inspection revealed no further visible abnormality. The wound was irrigated copiously with sterile saline for irrigation. The fingers  remained pink and well perfused. Hemostasis was easily obtained with direct pressure and a nail fold stent was inserted under the eponychial skin. The wound was dressed with adaptic, dry sterile dressings, and a very well padded hand and finger dressing was applied.        Rodney Gonzalez  was awakened from anesthesia having tolerated the procedure without apparent complication, and was returned to the recovery room in stable condition. At the conclusion of the procedure all needle, instrument, and sponge counts were correct. Denny Kamara MD   11/22/2022 , 3:16 PM

## 2022-11-28 PROBLEM — S61.309A AVULSION OF FINGERNAIL: Status: ACTIVE | Noted: 2022-11-28

## 2022-12-07 ENCOUNTER — OFFICE VISIT (OUTPATIENT)
Dept: ORTHOPEDIC SURGERY | Age: 72
End: 2022-12-07

## 2022-12-07 VITALS — RESPIRATION RATE: 16 BRPM | BODY MASS INDEX: 32.51 KG/M2 | WEIGHT: 240 LBS | HEIGHT: 72 IN

## 2022-12-07 DIAGNOSIS — S69.92XA INJURY OF NAIL BED OF FINGER OF LEFT HAND, INITIAL ENCOUNTER: Primary | ICD-10-CM

## 2022-12-07 PROCEDURE — 99024 POSTOP FOLLOW-UP VISIT: CPT | Performed by: ORTHOPAEDIC SURGERY

## 2022-12-07 NOTE — PROGRESS NOTES
Mr. Flor Luke returns today in follow-up of his recent Removal  of Left Small Finger nail plate  done approximately 2 weeks ago. He has done well noting mild discomfort and no other reported complications. He notes pre-operative symptoms to be completely resolved at this time. Physical Exam:  Bandage intact and well cared for  Skin incision is healing well, without erythema, drainage or sign of infection. Digital range of motion is limited by pain. Wrist range of motion is Full. Sensation is not changed from preoperatvely  Vascular examination reveals normal, good capillary refill, and good color. Swelling is minimal.  Sensory and Motor Median Nerve function is intact. Impression:  Mr. Flor Luke is doing well after recent Removal  of Left Small Finger nail plate     Plan:  Mr. Flor Luke is instructed in work on Active & Passive range of motion of the digits, wrist, & elbow. These modalities were specifically demonstrated to him today. We discussed the appropriateness of gradual resumption of use of the operated hand and the return to normal use as comfort allows. He is given instructions regarding management of the fresh surgical incision and progressive use of desensitization and tissue massage techniques. We discussed the appropriate expectations and timeline for symptom improvement. I have asked Mr. Flor Luke to follow-up with me or contact me by telephone over the next 2-4 weeks if his symptoms have not fully resolved or if he has not regained full & painless return of function. He is also specifically instructed to return to the office or call for an appointment sooner if his symptoms are changing or worsening prior to that time.

## 2022-12-08 DIAGNOSIS — I10 HYPERTENSION, UNSPECIFIED TYPE: ICD-10-CM

## 2022-12-08 DIAGNOSIS — K21.9 GASTROESOPHAGEAL REFLUX DISEASE, UNSPECIFIED WHETHER ESOPHAGITIS PRESENT: ICD-10-CM

## 2022-12-08 DIAGNOSIS — M10.00 IDIOPATHIC GOUT, UNSPECIFIED CHRONICITY, UNSPECIFIED SITE: ICD-10-CM

## 2022-12-08 DIAGNOSIS — N39.44 NOCTURNAL ENURESIS: ICD-10-CM

## 2022-12-08 DIAGNOSIS — E11.9 TYPE 2 DIABETES MELLITUS WITHOUT COMPLICATION, WITHOUT LONG-TERM CURRENT USE OF INSULIN (HCC): ICD-10-CM

## 2022-12-08 RX ORDER — ALLOPURINOL 300 MG/1
300 TABLET ORAL DAILY
Qty: 90 TABLET | Refills: 0 | OUTPATIENT
Start: 2022-12-08

## 2022-12-08 RX ORDER — HYDROCHLOROTHIAZIDE 12.5 MG/1
25 CAPSULE, GELATIN COATED ORAL DAILY
Qty: 90 CAPSULE | Refills: 1 | Status: CANCELLED | OUTPATIENT
Start: 2022-12-08

## 2022-12-08 RX ORDER — METFORMIN HYDROCHLORIDE 500 MG/1
TABLET, EXTENDED RELEASE ORAL
Qty: 90 TABLET | Refills: 1 | Status: CANCELLED | OUTPATIENT
Start: 2022-12-08

## 2022-12-08 RX ORDER — TAMSULOSIN HYDROCHLORIDE 0.4 MG/1
0.4 CAPSULE ORAL DAILY
Qty: 90 CAPSULE | Refills: 1 | Status: CANCELLED | OUTPATIENT
Start: 2022-12-08

## 2022-12-08 RX ORDER — ALLOPURINOL 100 MG/1
100 TABLET ORAL DAILY
Qty: 90 TABLET | Refills: 0 | OUTPATIENT
Start: 2022-12-08 | End: 2023-03-08

## 2022-12-08 RX ORDER — OMEPRAZOLE 20 MG/1
20 CAPSULE, DELAYED RELEASE ORAL 2 TIMES DAILY
Qty: 90 CAPSULE | Refills: 1 | Status: CANCELLED | OUTPATIENT
Start: 2022-12-08

## 2022-12-08 RX ORDER — ATORVASTATIN CALCIUM 40 MG/1
40 TABLET, FILM COATED ORAL DAILY
Qty: 90 TABLET | Refills: 1 | Status: CANCELLED | OUTPATIENT
Start: 2022-12-08

## 2022-12-08 NOTE — TELEPHONE ENCOUNTER
Medication:   Requested Prescriptions     Pending Prescriptions Disp Refills    atorvastatin (LIPITOR) 40 MG tablet 90 tablet 1     Sig: Take 1 tablet by mouth daily    tamsulosin (FLOMAX) 0.4 MG capsule 90 capsule 1     Sig: Take 1 capsule by mouth daily    metFORMIN (GLUCOPHAGE-XR) 500 MG extended release tablet 90 tablet 1     Sig: TAKE 1 TABLET EVERY NIGHT    hydroCHLOROthiazide (MICROZIDE) 12.5 MG capsule 90 capsule 1     Sig: Take 2 capsules by mouth daily    omeprazole (PRILOSEC) 20 MG delayed release capsule 90 capsule 1     Sig: Take 1 capsule by mouth 2 times daily       Last Filled:    Atorvastatin 8/17/2022 #90 w/ 1 refill   Tamsulosin - 8/17/2022 #90 w/ 1 refill   Metformin - 8/17/2022 #90 w/ 1 refill   HCTZ - 8/17/2022 #90 w/ 1 refill   Omeprazole 8/17/2022 #90 w/ 1 refill    Request denied, refill available from pharmacy.      Patient Phone Number: [phone] (home)     Last appt: 11/18/2022   Next appt: 5/17/2023    Last Labs DM:   Lab Results   Component Value Date/Time    LABA1C 6.3 11/18/2022 01:12 PM     Last Lipid:   Lab Results   Component Value Date/Time    CHOL 104 07/25/2022 09:29 AM    TRIG 146 07/25/2022 09:29 AM    HDL 37 07/25/2022 09:29 AM    LDLCALC 38 07/25/2022 09:29 AM     Last PSA: No results found for: PSA  Last Thyroid: No results found for: TSH, FT3, W2FZSPU, T4FREE, G0MOCPD

## 2022-12-15 DIAGNOSIS — I10 HYPERTENSION, UNSPECIFIED TYPE: ICD-10-CM

## 2022-12-15 DIAGNOSIS — K21.9 GASTROESOPHAGEAL REFLUX DISEASE, UNSPECIFIED WHETHER ESOPHAGITIS PRESENT: ICD-10-CM

## 2022-12-15 RX ORDER — OMEPRAZOLE 20 MG/1
CAPSULE, DELAYED RELEASE ORAL
Qty: 180 CAPSULE | Refills: 1 | Status: SHIPPED | OUTPATIENT
Start: 2022-12-15

## 2022-12-15 RX ORDER — HYDROCHLOROTHIAZIDE 12.5 MG/1
CAPSULE, GELATIN COATED ORAL
Qty: 180 CAPSULE | Refills: 1 | Status: SHIPPED | OUTPATIENT
Start: 2022-12-15

## 2022-12-15 NOTE — TELEPHONE ENCOUNTER
Medication:   Requested Prescriptions     Pending Prescriptions Disp Refills    hydroCHLOROthiazide (MICROZIDE) 12.5 MG capsule [Pharmacy Med Name: HYDROCHLOROTHIAZIDE 12.5 MG Capsule] 180 capsule      Sig: TAKE 2 CAPSULES EVERY DAY    omeprazole (PRILOSEC) 20 MG delayed release capsule [Pharmacy Med Name: OMEPRAZOLE 20 MG Capsule Delayed Release] 180 capsule      Sig: TAKE 1 CAPSULE TWICE DAILY        Last Filled: [id number]     Patient Phone Number: [phone] (home)     Last appt: 11/18/2022   Next appt: 5/17/2023    Last OARRS: No flowsheet data found.

## 2022-12-23 DIAGNOSIS — M10.00 IDIOPATHIC GOUT, UNSPECIFIED CHRONICITY, UNSPECIFIED SITE: ICD-10-CM

## 2022-12-27 RX ORDER — ALLOPURINOL 300 MG/1
TABLET ORAL
Qty: 90 TABLET | Refills: 0 | OUTPATIENT
Start: 2022-12-27

## 2023-01-07 DIAGNOSIS — E11.9 TYPE 2 DIABETES MELLITUS WITHOUT COMPLICATION, WITHOUT LONG-TERM CURRENT USE OF INSULIN (HCC): ICD-10-CM

## 2023-01-09 RX ORDER — ATORVASTATIN CALCIUM 40 MG/1
TABLET, FILM COATED ORAL
Qty: 90 TABLET | Refills: 1 | OUTPATIENT
Start: 2023-01-09

## 2023-01-23 DIAGNOSIS — N39.44 NOCTURNAL ENURESIS: ICD-10-CM

## 2023-01-23 RX ORDER — TAMSULOSIN HYDROCHLORIDE 0.4 MG/1
CAPSULE ORAL
Qty: 90 CAPSULE | Refills: 1 | Status: SHIPPED | OUTPATIENT
Start: 2023-01-23

## 2023-01-23 NOTE — TELEPHONE ENCOUNTER
Medication:   Requested Prescriptions     Pending Prescriptions Disp Refills    tamsulosin (FLOMAX) 0.4 MG capsule [Pharmacy Med Name: TAMSULOSIN HYDROCHLORIDE 0.4 MG Capsule] 90 capsule 1     Sig: TAKE 1 CAPSULE EVERY DAY        Last Filled:  8/17/2022 - 90 capsules w/ 1 refill    Patient Phone Number: [phone] (home)     Last appt: 11/18/2022   Next appt: 5/17/2023    Last OARRS: No flowsheet data found.

## 2023-02-27 DIAGNOSIS — E11.9 TYPE 2 DIABETES MELLITUS WITHOUT COMPLICATION, WITHOUT LONG-TERM CURRENT USE OF INSULIN (HCC): ICD-10-CM

## 2023-02-27 RX ORDER — METFORMIN HYDROCHLORIDE 500 MG/1
TABLET, EXTENDED RELEASE ORAL
Qty: 90 TABLET | Refills: 1 | Status: SHIPPED | OUTPATIENT
Start: 2023-02-27

## 2023-02-27 NOTE — TELEPHONE ENCOUNTER
Medication:   Requested Prescriptions     Pending Prescriptions Disp Refills    metFORMIN (GLUCOPHAGE-XR) 500 MG extended release tablet [Pharmacy Med Name: Penny Schulz  MG Tablet Extended Release 24 Hour] 90 tablet 1     Sig: TAKE 1 TABLET EVERY NIGHT       Last Filled:  [id number]    Patient Phone Number: [phone] (home)     Last appt: 11/18/2022   Next appt: 5/17/2023    Last Labs DM:   Lab Results   Component Value Date/Time    LABA1C 6.3 11/18/2022 01:12 PM

## 2023-04-27 RX ORDER — ASCORBIC ACID 500 MG
1 TABLET ORAL DAILY
COMMUNITY
Start: 2010-02-02

## 2023-05-01 ENCOUNTER — OFFICE VISIT (OUTPATIENT)
Dept: PRIMARY CARE CLINIC | Age: 73
End: 2023-05-01
Payer: MEDICARE

## 2023-05-01 VITALS
BODY MASS INDEX: 32.31 KG/M2 | TEMPERATURE: 96.3 F | WEIGHT: 238.2 LBS | DIASTOLIC BLOOD PRESSURE: 68 MMHG | SYSTOLIC BLOOD PRESSURE: 104 MMHG | OXYGEN SATURATION: 98 % | HEART RATE: 78 BPM

## 2023-05-01 DIAGNOSIS — Z01.818 PRE-OP EXAM: ICD-10-CM

## 2023-05-01 DIAGNOSIS — E11.9 TYPE 2 DIABETES MELLITUS WITHOUT COMPLICATION, WITHOUT LONG-TERM CURRENT USE OF INSULIN (HCC): ICD-10-CM

## 2023-05-01 DIAGNOSIS — N40.0 ENLARGED PROSTATE: Primary | ICD-10-CM

## 2023-05-01 DIAGNOSIS — M10.00 IDIOPATHIC GOUT, UNSPECIFIED CHRONICITY, UNSPECIFIED SITE: ICD-10-CM

## 2023-05-01 LAB
ALBUMIN SERPL-MCNC: 4.3 G/DL (ref 3.4–5)
ALBUMIN/GLOB SERPL: 1.7 {RATIO} (ref 1.1–2.2)
ALP SERPL-CCNC: 130 U/L (ref 40–129)
ALT SERPL-CCNC: 20 U/L (ref 10–40)
ANION GAP SERPL CALCULATED.3IONS-SCNC: 15 MMOL/L (ref 3–16)
AST SERPL-CCNC: 19 U/L (ref 15–37)
BILIRUB SERPL-MCNC: 0.6 MG/DL (ref 0–1)
BUN SERPL-MCNC: 24 MG/DL (ref 7–20)
CALCIUM SERPL-MCNC: 9.7 MG/DL (ref 8.3–10.6)
CHLORIDE SERPL-SCNC: 102 MMOL/L (ref 99–110)
CO2 SERPL-SCNC: 23 MMOL/L (ref 21–32)
CREAT SERPL-MCNC: 1.2 MG/DL (ref 0.8–1.3)
GFR SERPLBLD CREATININE-BSD FMLA CKD-EPI: >60 ML/MIN/{1.73_M2}
GLUCOSE SERPL-MCNC: 147 MG/DL (ref 70–99)
POTASSIUM SERPL-SCNC: 4.1 MMOL/L (ref 3.5–5.1)
PROT SERPL-MCNC: 6.9 G/DL (ref 6.4–8.2)
SODIUM SERPL-SCNC: 140 MMOL/L (ref 136–145)

## 2023-05-01 PROCEDURE — 36415 COLL VENOUS BLD VENIPUNCTURE: CPT | Performed by: NURSE PRACTITIONER

## 2023-05-01 PROCEDURE — 99214 OFFICE O/P EST MOD 30 MIN: CPT | Performed by: NURSE PRACTITIONER

## 2023-05-01 PROCEDURE — 3017F COLORECTAL CA SCREEN DOC REV: CPT | Performed by: NURSE PRACTITIONER

## 2023-05-01 PROCEDURE — 93000 ELECTROCARDIOGRAM COMPLETE: CPT | Performed by: NURSE PRACTITIONER

## 2023-05-01 PROCEDURE — 1123F ACP DISCUSS/DSCN MKR DOCD: CPT | Performed by: NURSE PRACTITIONER

## 2023-05-01 PROCEDURE — G8427 DOCREV CUR MEDS BY ELIG CLIN: HCPCS | Performed by: NURSE PRACTITIONER

## 2023-05-01 PROCEDURE — 1036F TOBACCO NON-USER: CPT | Performed by: NURSE PRACTITIONER

## 2023-05-01 PROCEDURE — 3044F HG A1C LEVEL LT 7.0%: CPT | Performed by: NURSE PRACTITIONER

## 2023-05-01 PROCEDURE — G8417 CALC BMI ABV UP PARAM F/U: HCPCS | Performed by: NURSE PRACTITIONER

## 2023-05-01 PROCEDURE — 2022F DILAT RTA XM EVC RTNOPTHY: CPT | Performed by: NURSE PRACTITIONER

## 2023-05-01 RX ORDER — SPIRONOLACTONE 25 MG/1
12.5 TABLET ORAL
COMMUNITY
Start: 2023-01-07

## 2023-05-01 RX ORDER — METOPROLOL SUCCINATE 50 MG/1
25 TABLET, EXTENDED RELEASE ORAL
COMMUNITY
Start: 2022-11-22

## 2023-05-01 RX ORDER — ALLOPURINOL 300 MG/1
300 TABLET ORAL DAILY
Qty: 90 TABLET | Refills: 1 | Status: SHIPPED | OUTPATIENT
Start: 2023-05-01

## 2023-05-01 RX ORDER — ALLOPURINOL 100 MG/1
100 TABLET ORAL DAILY
Qty: 90 TABLET | Refills: 1 | Status: SHIPPED | OUTPATIENT
Start: 2023-05-01 | End: 2023-07-30

## 2023-05-01 SDOH — ECONOMIC STABILITY: FOOD INSECURITY: WITHIN THE PAST 12 MONTHS, YOU WORRIED THAT YOUR FOOD WOULD RUN OUT BEFORE YOU GOT MONEY TO BUY MORE.: NEVER TRUE

## 2023-05-01 SDOH — ECONOMIC STABILITY: HOUSING INSECURITY
IN THE LAST 12 MONTHS, WAS THERE A TIME WHEN YOU DID NOT HAVE A STEADY PLACE TO SLEEP OR SLEPT IN A SHELTER (INCLUDING NOW)?: NO

## 2023-05-01 SDOH — ECONOMIC STABILITY: INCOME INSECURITY: HOW HARD IS IT FOR YOU TO PAY FOR THE VERY BASICS LIKE FOOD, HOUSING, MEDICAL CARE, AND HEATING?: NOT HARD AT ALL

## 2023-05-01 SDOH — ECONOMIC STABILITY: FOOD INSECURITY: WITHIN THE PAST 12 MONTHS, THE FOOD YOU BOUGHT JUST DIDN'T LAST AND YOU DIDN'T HAVE MONEY TO GET MORE.: NEVER TRUE

## 2023-05-01 ASSESSMENT — ENCOUNTER SYMPTOMS
WHEEZING: 0
NAUSEA: 0
CONSTIPATION: 0
VOMITING: 0
DIARRHEA: 0
COUGH: 0
SHORTNESS OF BREATH: 0

## 2023-05-01 ASSESSMENT — PATIENT HEALTH QUESTIONNAIRE - PHQ9
SUM OF ALL RESPONSES TO PHQ9 QUESTIONS 1 & 2: 0
SUM OF ALL RESPONSES TO PHQ QUESTIONS 1-9: 0
1. LITTLE INTEREST OR PLEASURE IN DOING THINGS: 0
2. FEELING DOWN, DEPRESSED OR HOPELESS: 0
SUM OF ALL RESPONSES TO PHQ QUESTIONS 1-9: 0

## 2023-05-01 NOTE — PROGRESS NOTES
Preoperative Consultation      Radha Spann  YOB: 1950  Date of Service: 5/1/2023    Vitals:    05/01/23 1021   BP: 104/68   Site: Left Upper Arm   Position: Sitting   Pulse: 78   Temp: (!) 96.3 °F (35.7 °C)   SpO2: 98%   Weight: 238 lb 3.2 oz (108 kg)     Wt Readings from Last 2 Encounters:   05/01/23 238 lb 3.2 oz (108 kg)   12/07/22 240 lb (108.9 kg)     BP Readings from Last 3 Encounters:   05/01/23 104/68   11/22/22 118/64   11/18/22 120/68        No Known Allergies  Outpatient Medications Marked as Taking for the 5/1/23 encounter (Office Visit) with JOSE Rojas CNP   Medication Sig Dispense Refill    metoprolol succinate (TOPROL XL) 50 MG extended release tablet 0.5 tablets      spironolactone (ALDACTONE) 25 MG tablet 0.5 tablets      allopurinol (ZYLOPRIM) 100 MG tablet Take 1 tablet by mouth daily 90 tablet 1    allopurinol (ZYLOPRIM) 300 MG tablet Take 1 tablet by mouth daily 90 tablet 1    metFORMIN (GLUCOPHAGE-XR) 500 MG extended release tablet TAKE 1 TABLET EVERY NIGHT 90 tablet 1    tamsulosin (FLOMAX) 0.4 MG capsule TAKE 1 CAPSULE EVERY DAY 90 capsule 1    hydroCHLOROthiazide (MICROZIDE) 12.5 MG capsule TAKE 2 CAPSULES EVERY  capsule 1    omeprazole (PRILOSEC) 20 MG delayed release capsule TAKE 1 CAPSULE TWICE DAILY 180 capsule 1    loratadine (CLARITIN REDITABS) 10 MG dissolvable tablet Take 1 tablet by mouth daily      atorvastatin (LIPITOR) 40 MG tablet Take 1 tablet by mouth daily 90 tablet 1    Blood Glucose Monitoring Suppl KIT 1 kit by Does not apply route daily Dispense according to insurance formulary 1 kit 0    blood glucose test strips (ASCENSIA AUTODISC VI;ONE TOUCH ULTRA TEST VI) strip 1 each by In Vitro route daily Test as directed,Dispense according to insurance formulary 300 each 1    aspirin 81 MG EC tablet Take 1 tablet by mouth daily      Multiple Vitamins-Minerals (OCUVITE-LUTEIN) CAPS

## 2023-05-02 LAB
EST. AVERAGE GLUCOSE BLD GHB EST-MCNC: 134.1 MG/DL
HBA1C MFR BLD: 6.3 %

## 2023-05-03 ENCOUNTER — PATIENT MESSAGE (OUTPATIENT)
Dept: PRIMARY CARE CLINIC | Age: 73
End: 2023-05-03

## 2023-05-03 NOTE — TELEPHONE ENCOUNTER
From: Marta Garcia  To: Ky Silveira  Sent: 5/3/2023 7:52 AM EDT  Subject: pre surgery form    can you print out the pre surgery form for me to

## 2023-05-04 DIAGNOSIS — R94.31 ABNORMAL EKG: Primary | ICD-10-CM

## 2023-05-11 NOTE — PROGRESS NOTES
[facility]     Outpatient Cardiology         Patient Name:  Mark Knowles  Requesting Physician: No admitting provider for patient encounter. Primary Care Physician: JOSE Rubin CNP    Reason for Consultation/Chief Complaint:   Chief Complaint   Patient presents with    New Patient    Cardiac Clearance       HPI:     Mark Knowles is a 67 y.o. male with a history of DM, CAD, cardiomyopathy, GERD, HLD, OA and BPH was referred by JOSE Rubin CNP for abnormal EKG and pre-operative risk assessment for prostate surgery. Today he reports that he normally follows up at the [place]. His echocardiogram from 8/2022 showed an EF of 35-40% with aortic dilation of 4.0 cm. He reports that he had a LHC that did not results in any intervention. He feels lightheadedness when he bends down. He also feels occasional shortness of breath with his activities. He denies PND or orthopnea. Patient currently denies any weight gain, edema, palpitations, chest pain, and syncope. EKG 5/1/2023: SR, nonspecific QRS widening, anterior fascicular block. cysto TRUSP scheduled 5/19/2023 at [facility]. Histories:     Past Medical History:   has a past medical history of Anemia, BPH (benign prostatic hyperplasia), Diabetes mellitus ([address] GERD (gastroesophageal reflux disease), Gout, Hyperlipidemia, and Osteoarthritis. Surgical History:   has a past surgical history that includes Hemorrhoid surgery; Skin cancer excision; and Finger nail surgery (Left, 11/22/2022). Social History:   reports that he quit smoking about 23 years ago. His smoking use included cigarettes. He started smoking about 55 years ago. He has a 15.00 pack-year smoking history. He has never used smokeless tobacco. He reports that he does not currently use alcohol. He reports that he does not currently use drugs.      Family History:  No evidence for sudden cardiac death or premature CAD    Medications:

## 2023-05-12 ENCOUNTER — OFFICE VISIT (OUTPATIENT)
Dept: CARDIOLOGY CLINIC | Age: 73
End: 2023-05-12
Payer: MEDICARE

## 2023-05-12 VITALS
SYSTOLIC BLOOD PRESSURE: 104 MMHG | WEIGHT: 232.4 LBS | HEART RATE: 82 BPM | BODY MASS INDEX: 31.48 KG/M2 | DIASTOLIC BLOOD PRESSURE: 62 MMHG | HEIGHT: 72 IN

## 2023-05-12 DIAGNOSIS — R94.31 ABNORMAL EKG: ICD-10-CM

## 2023-05-12 DIAGNOSIS — E78.5 HYPERLIPIDEMIA, UNSPECIFIED HYPERLIPIDEMIA TYPE: ICD-10-CM

## 2023-05-12 DIAGNOSIS — I42.9 CARDIOMYOPATHY, UNSPECIFIED TYPE (HCC): Primary | ICD-10-CM

## 2023-05-12 DIAGNOSIS — Z01.810 PREOP CARDIOVASCULAR EXAM: ICD-10-CM

## 2023-05-12 PROCEDURE — 99204 OFFICE O/P NEW MOD 45 MIN: CPT | Performed by: INTERNAL MEDICINE

## 2023-05-12 PROCEDURE — 1123F ACP DISCUSS/DSCN MKR DOCD: CPT | Performed by: INTERNAL MEDICINE

## 2023-05-12 PROCEDURE — 93000 ELECTROCARDIOGRAM COMPLETE: CPT | Performed by: INTERNAL MEDICINE

## 2023-05-12 PROCEDURE — 3017F COLORECTAL CA SCREEN DOC REV: CPT | Performed by: INTERNAL MEDICINE

## 2023-05-12 PROCEDURE — G8417 CALC BMI ABV UP PARAM F/U: HCPCS | Performed by: INTERNAL MEDICINE

## 2023-05-12 PROCEDURE — 1036F TOBACCO NON-USER: CPT | Performed by: INTERNAL MEDICINE

## 2023-05-12 PROCEDURE — G8427 DOCREV CUR MEDS BY ELIG CLIN: HCPCS | Performed by: INTERNAL MEDICINE

## 2023-05-12 ASSESSMENT — ENCOUNTER SYMPTOMS
VOMITING: 0
ABDOMINAL PAIN: 0

## 2023-05-12 NOTE — PATIENT INSTRUCTIONS
Echocardiogram   Will obtain records from the [facility] regarding cath  If your heart function remains low, will not recommend this proceed be done in a surgery center.    RTC after echo

## 2023-05-13 DIAGNOSIS — E11.9 TYPE 2 DIABETES MELLITUS WITHOUT COMPLICATION, WITHOUT LONG-TERM CURRENT USE OF INSULIN (HCC): ICD-10-CM

## 2023-05-15 RX ORDER — ATORVASTATIN CALCIUM 40 MG/1
TABLET, FILM COATED ORAL
Qty: 90 TABLET | Refills: 1 | Status: SHIPPED | OUTPATIENT
Start: 2023-05-15

## 2023-05-15 NOTE — TELEPHONE ENCOUNTER
Medication:   Requested Prescriptions     Pending Prescriptions Disp Refills    atorvastatin (LIPITOR) 40 MG tablet [Pharmacy Med Name: ATORVASTATIN CALCIUM 40 MG Tablet] 90 tablet 1     Sig: TAKE 1 TABLET EVERY DAY       Last Filled:  [id number]    Patient Phone Number: [phone] (home)     Last appt: 5/1/2023   Next appt: 6/23/2023    Last Lipid:   Lab Results   Component Value Date/Time    CHOL 104 07/25/2022 09:29 AM    TRIG 146 07/25/2022 09:29 AM    HDL 37 07/25/2022 09:29 AM    LDLCALC 38 07/25/2022 09:29 AM

## 2023-05-18 ENCOUNTER — TELEPHONE (OUTPATIENT)
Dept: CARDIOLOGY CLINIC | Age: 73
End: 2023-05-18

## 2023-05-31 ENCOUNTER — PROCEDURE VISIT (OUTPATIENT)
Dept: CARDIOLOGY CLINIC | Age: 73
End: 2023-05-31
Payer: MEDICARE

## 2023-05-31 DIAGNOSIS — R94.31 ABNORMAL EKG: ICD-10-CM

## 2023-05-31 DIAGNOSIS — I42.9 CARDIOMYOPATHY, UNSPECIFIED TYPE (HCC): ICD-10-CM

## 2023-05-31 LAB
LV EF: 48 %
LVEF MODALITY: NORMAL

## 2023-05-31 PROCEDURE — 93306 TTE W/DOPPLER COMPLETE: CPT | Performed by: INTERNAL MEDICINE

## 2023-06-16 PROBLEM — I77.819 AORTIC DILATATION (HCC): Status: ACTIVE | Noted: 2023-06-16

## 2023-06-23 ENCOUNTER — OFFICE VISIT (OUTPATIENT)
Dept: PRIMARY CARE CLINIC | Age: 73
End: 2023-06-23
Payer: MEDICARE

## 2023-06-23 ENCOUNTER — HOSPITAL ENCOUNTER (OUTPATIENT)
Dept: CT IMAGING | Age: 73
Discharge: HOME | End: 2023-06-23
Attending: INTERNAL MEDICINE
Payer: MEDICARE

## 2023-06-23 VITALS
DIASTOLIC BLOOD PRESSURE: 60 MMHG | SYSTOLIC BLOOD PRESSURE: 100 MMHG | BODY MASS INDEX: 31.67 KG/M2 | OXYGEN SATURATION: 98 % | HEART RATE: 75 BPM | TEMPERATURE: 97.1 F | WEIGHT: 233.8 LBS | HEIGHT: 72 IN

## 2023-06-23 DIAGNOSIS — K21.9 GASTROESOPHAGEAL REFLUX DISEASE, UNSPECIFIED WHETHER ESOPHAGITIS PRESENT: ICD-10-CM

## 2023-06-23 DIAGNOSIS — N39.44 NOCTURNAL ENURESIS: ICD-10-CM

## 2023-06-23 DIAGNOSIS — M10.00 IDIOPATHIC GOUT, UNSPECIFIED CHRONICITY, UNSPECIFIED SITE: ICD-10-CM

## 2023-06-23 DIAGNOSIS — Z00.00 INITIAL MEDICARE ANNUAL WELLNESS VISIT: Primary | ICD-10-CM

## 2023-06-23 DIAGNOSIS — E11.9 TYPE 2 DIABETES MELLITUS WITHOUT COMPLICATION, WITHOUT LONG-TERM CURRENT USE OF INSULIN (HCC): ICD-10-CM

## 2023-06-23 DIAGNOSIS — I42.9 CARDIOMYOPATHY, UNSPECIFIED TYPE (HCC): ICD-10-CM

## 2023-06-23 PROCEDURE — 3044F HG A1C LEVEL LT 7.0%: CPT | Performed by: NURSE PRACTITIONER

## 2023-06-23 PROCEDURE — 3017F COLORECTAL CA SCREEN DOC REV: CPT | Performed by: NURSE PRACTITIONER

## 2023-06-23 PROCEDURE — 71250 CT THORAX DX C-: CPT

## 2023-06-23 PROCEDURE — G0438 PPPS, INITIAL VISIT: HCPCS | Performed by: NURSE PRACTITIONER

## 2023-06-23 PROCEDURE — 1123F ACP DISCUSS/DSCN MKR DOCD: CPT | Performed by: NURSE PRACTITIONER

## 2023-06-23 RX ORDER — ALLOPURINOL 300 MG/1
300 TABLET ORAL DAILY
Qty: 90 TABLET | Refills: 1 | Status: SHIPPED | OUTPATIENT
Start: 2023-06-23

## 2023-06-23 RX ORDER — TAMSULOSIN HYDROCHLORIDE 0.4 MG/1
0.4 CAPSULE ORAL DAILY
Qty: 90 CAPSULE | Refills: 1 | Status: SHIPPED | OUTPATIENT
Start: 2023-06-23

## 2023-06-23 RX ORDER — ALLOPURINOL 100 MG/1
100 TABLET ORAL DAILY
Qty: 90 TABLET | Refills: 1 | Status: SHIPPED | OUTPATIENT
Start: 2023-06-23 | End: 2023-12-20

## 2023-06-23 RX ORDER — METFORMIN HYDROCHLORIDE 500 MG/1
500 TABLET, EXTENDED RELEASE ORAL NIGHTLY
Qty: 90 TABLET | Refills: 1 | Status: SHIPPED | OUTPATIENT
Start: 2023-06-23

## 2023-06-23 RX ORDER — ATORVASTATIN CALCIUM 40 MG/1
40 TABLET, FILM COATED ORAL DAILY
Qty: 90 TABLET | Refills: 1 | Status: SHIPPED | OUTPATIENT
Start: 2023-06-23

## 2023-06-23 RX ORDER — OMEPRAZOLE 20 MG/1
20 CAPSULE, DELAYED RELEASE ORAL 2 TIMES DAILY
Qty: 180 CAPSULE | Refills: 1 | Status: SHIPPED | OUTPATIENT
Start: 2023-06-23

## 2023-06-23 SDOH — HEALTH STABILITY: PHYSICAL HEALTH: ON AVERAGE, HOW MANY MINUTES DO YOU ENGAGE IN EXERCISE AT THIS LEVEL?: 10 MIN

## 2023-06-23 SDOH — HEALTH STABILITY: PHYSICAL HEALTH: ON AVERAGE, HOW MANY DAYS PER WEEK DO YOU ENGAGE IN MODERATE TO STRENUOUS EXERCISE (LIKE A BRISK WALK)?: 1 DAY

## 2023-06-23 ASSESSMENT — PATIENT HEALTH QUESTIONNAIRE - PHQ9
SUM OF ALL RESPONSES TO PHQ QUESTIONS 1-9: 0
SUM OF ALL RESPONSES TO PHQ QUESTIONS 1-9: 0
1. LITTLE INTEREST OR PLEASURE IN DOING THINGS: 0
SUM OF ALL RESPONSES TO PHQ9 QUESTIONS 1 & 2: 0
SUM OF ALL RESPONSES TO PHQ QUESTIONS 1-9: 0
SUM OF ALL RESPONSES TO PHQ QUESTIONS 1-9: 0
2. FEELING DOWN, DEPRESSED OR HOPELESS: 0

## 2023-06-23 ASSESSMENT — LIFESTYLE VARIABLES
HOW OFTEN DO YOU HAVE A DRINK CONTAINING ALCOHOL: NEVER
HOW MANY STANDARD DRINKS CONTAINING ALCOHOL DO YOU HAVE ON A TYPICAL DAY: PATIENT DOES NOT DRINK
HOW MANY STANDARD DRINKS CONTAINING ALCOHOL DO YOU HAVE ON A TYPICAL DAY: 0
HOW OFTEN DO YOU HAVE SIX OR MORE DRINKS ON ONE OCCASION: 1
HOW OFTEN DO YOU HAVE A DRINK CONTAINING ALCOHOL: 1

## 2023-06-23 NOTE — PROGRESS NOTES
procedure. Patient has noticed a urinary incontinence. Patient's complete Health Risk Assessment and screening values have been reviewed and are found in Flowsheets. The following problems were reviewed today and where indicated follow up appointments were made and/or referrals ordered. Positive Risk Factor Screenings with Interventions:                 Weight and Activity:  Physical Activity: Insufficiently Active    Days of Exercise per Week: 1 day    Minutes of Exercise per Session: 10 min     On average, how many days per week do you engage in moderate to strenuous exercise (like a brisk walk)?: 1 day  Have you lost any weight without trying in the past 3 months?: No  Body mass index is 31.71 kg/m². (!) Abnormal  Obesity Interventions:  Increasing physical activity              Safety:  Do all of your stairways have a railing or banister?: (!) No  Interventions:  See AVS for additional education material                     Objective   Vitals:    06/23/23 1124   BP: 100/60   Site: Left Upper Arm   Position: Sitting   Pulse: 75   Temp: 97.1 °F (36.2 °C)   SpO2: 98%   Weight: 233 lb 12.8 oz (106.1 kg)   Height: 6' (1.829 m)      Body mass index is 31.71 kg/m².         General Appearance: alert and oriented to person, place and time, well developed and well- nourished, in no acute distress  Skin: warm and dry, no rash or erythema  Head: normocephalic and atraumatic  Eyes: pupils equal, round, and reactive to light, extraocular eye movements intact, conjunctivae normal  ENT: tympanic membrane, external ear and ear canal normal bilaterally, nose without deformity, nasal mucosa and turbinates normal without polyps  Neck: supple and non-tender without mass, no thyromegaly or thyroid nodules, no cervical lymphadenopathy  Pulmonary/Chest: clear to auscultation bilaterally- no wheezes, rales or rhonchi, normal air movement, no respiratory distress  Cardiovascular: normal rate, regular rhythm, normal S1 and S2,

## 2023-06-28 ENCOUNTER — TELEPHONE (OUTPATIENT)
Dept: PULMONOLOGY | Age: 73
End: 2023-06-28

## 2023-06-30 DIAGNOSIS — I42.9 CARDIOMYOPATHY, UNSPECIFIED TYPE (HCC): ICD-10-CM

## 2023-06-30 LAB
ALBUMIN SERPL-MCNC: 3.8 G/DL (ref 3.4–5)
ALBUMIN/GLOB SERPL: 1.7 {RATIO} (ref 1.1–2.2)
ALP SERPL-CCNC: 123 U/L (ref 40–129)
ALT SERPL-CCNC: 13 U/L (ref 10–40)
ANION GAP SERPL CALCULATED.3IONS-SCNC: 10 MMOL/L (ref 3–16)
AST SERPL-CCNC: 16 U/L (ref 15–37)
BILIRUB SERPL-MCNC: 0.5 MG/DL (ref 0–1)
BUN SERPL-MCNC: 18 MG/DL (ref 7–20)
CALCIUM SERPL-MCNC: 9.1 MG/DL (ref 8.3–10.6)
CHLORIDE SERPL-SCNC: 104 MMOL/L (ref 99–110)
CO2 SERPL-SCNC: 24 MMOL/L (ref 21–32)
CREAT SERPL-MCNC: 1.2 MG/DL (ref 0.8–1.3)
GFR SERPLBLD CREATININE-BSD FMLA CKD-EPI: >60 ML/MIN/{1.73_M2}
GLUCOSE SERPL-MCNC: 129 MG/DL (ref 70–99)
POTASSIUM SERPL-SCNC: 4.6 MMOL/L (ref 3.5–5.1)
PROT SERPL-MCNC: 6.1 G/DL (ref 6.4–8.2)
SODIUM SERPL-SCNC: 138 MMOL/L (ref 136–145)

## 2023-07-05 ENCOUNTER — OFFICE VISIT (OUTPATIENT)
Dept: PULMONOLOGY | Age: 73
End: 2023-07-05
Payer: MEDICARE

## 2023-07-05 VITALS
HEIGHT: 75 IN | BODY MASS INDEX: 29.34 KG/M2 | RESPIRATION RATE: 16 BRPM | OXYGEN SATURATION: 94 % | HEART RATE: 103 BPM | SYSTOLIC BLOOD PRESSURE: 104 MMHG | DIASTOLIC BLOOD PRESSURE: 60 MMHG | WEIGHT: 236 LBS

## 2023-07-05 DIAGNOSIS — R91.1 NODULE OF UPPER LOBE OF LEFT LUNG: Primary | ICD-10-CM

## 2023-07-05 PROCEDURE — G8417 CALC BMI ABV UP PARAM F/U: HCPCS | Performed by: INTERNAL MEDICINE

## 2023-07-05 PROCEDURE — 1036F TOBACCO NON-USER: CPT | Performed by: INTERNAL MEDICINE

## 2023-07-05 PROCEDURE — 3017F COLORECTAL CA SCREEN DOC REV: CPT | Performed by: INTERNAL MEDICINE

## 2023-07-05 PROCEDURE — G8427 DOCREV CUR MEDS BY ELIG CLIN: HCPCS | Performed by: INTERNAL MEDICINE

## 2023-07-05 PROCEDURE — 99203 OFFICE O/P NEW LOW 30 MIN: CPT | Performed by: INTERNAL MEDICINE

## 2023-07-05 PROCEDURE — 1123F ACP DISCUSS/DSCN MKR DOCD: CPT | Performed by: INTERNAL MEDICINE

## 2023-07-19 ENCOUNTER — TELEPHONE (OUTPATIENT)
Dept: CARDIOLOGY CLINIC | Age: 73
End: 2023-07-19

## 2023-07-21 ENCOUNTER — OFFICE VISIT (OUTPATIENT)
Dept: PRIMARY CARE CLINIC | Age: 73
End: 2023-07-21
Payer: MEDICARE

## 2023-07-21 VITALS
RESPIRATION RATE: 18 BRPM | SYSTOLIC BLOOD PRESSURE: 110 MMHG | OXYGEN SATURATION: 96 % | BODY MASS INDEX: 29.52 KG/M2 | WEIGHT: 237.4 LBS | TEMPERATURE: 98.1 F | HEART RATE: 81 BPM | DIASTOLIC BLOOD PRESSURE: 66 MMHG | HEIGHT: 75 IN

## 2023-07-21 DIAGNOSIS — Z01.818 PRE-OP EXAM: ICD-10-CM

## 2023-07-21 DIAGNOSIS — R35.1 BENIGN PROSTATIC HYPERPLASIA WITH NOCTURIA: Primary | ICD-10-CM

## 2023-07-21 DIAGNOSIS — N40.1 BENIGN PROSTATIC HYPERPLASIA WITH NOCTURIA: Primary | ICD-10-CM

## 2023-07-21 PROCEDURE — 99213 OFFICE O/P EST LOW 20 MIN: CPT | Performed by: NURSE PRACTITIONER

## 2023-07-21 PROCEDURE — 1036F TOBACCO NON-USER: CPT | Performed by: NURSE PRACTITIONER

## 2023-07-21 PROCEDURE — 3017F COLORECTAL CA SCREEN DOC REV: CPT | Performed by: NURSE PRACTITIONER

## 2023-07-21 PROCEDURE — G8417 CALC BMI ABV UP PARAM F/U: HCPCS | Performed by: NURSE PRACTITIONER

## 2023-07-21 PROCEDURE — G8427 DOCREV CUR MEDS BY ELIG CLIN: HCPCS | Performed by: NURSE PRACTITIONER

## 2023-07-21 PROCEDURE — 1123F ACP DISCUSS/DSCN MKR DOCD: CPT | Performed by: NURSE PRACTITIONER

## 2023-07-21 ASSESSMENT — ENCOUNTER SYMPTOMS
COUGH: 0
SHORTNESS OF BREATH: 0
WHEEZING: 0

## 2023-07-21 NOTE — PROGRESS NOTES
Exam  Vitals reviewed. Constitutional:       Appearance: Normal appearance. HENT:      Head: Normocephalic. Right Ear: Tympanic membrane, ear canal and external ear normal.      Left Ear: Tympanic membrane, ear canal and external ear normal.      Nose: Nose normal.      Mouth/Throat:      Lips: Pink. Mouth: Mucous membranes are moist.      Pharynx: Oropharynx is clear. Uvula midline. Comments: Negative gag reflex   Eyes:      Extraocular Movements: Extraocular movements intact. Conjunctiva/sclera: Conjunctivae normal.      Pupils: Pupils are equal, round, and reactive to light. Cardiovascular:      Rate and Rhythm: Normal rate and regular rhythm. Pulses: Normal pulses. Heart sounds: Normal heart sounds. Pulmonary:      Effort: Pulmonary effort is normal.      Breath sounds: Normal breath sounds. Abdominal:      General: Abdomen is flat. Palpations: Abdomen is soft. Musculoskeletal:      Cervical back: Normal range of motion. Skin:     General: Skin is warm. Capillary Refill: Capillary refill takes less than 2 seconds. Neurological:      General: No focal deficit present. Mental Status: He is alert and oriented to person, place, and time. Psychiatric:         Mood and Affect: Mood normal.         Behavior: Behavior normal.         Thought Content: Thought content normal.         Judgment: Judgment normal.        Assessment/Plan:   1. Benign prostatic hyperplasia with nocturia  Here for pre op    2. Pre-op exam  - patient is intermediate risk for intermediate risk surgery. 1. Preoperative work up as follows: none  2. Change in medication regimen before surgery: Discontinue NSAIDs 7 days before surgery  3. Prophylaxis for cardiac events with perioperative beta-blockers: Not indicated  4. Deep vein thrombosis prophylaxis: regimen to be chosen by surgical team  5. No contraindications to planned surgery, patient will have cardiac clearance as well.

## 2023-07-31 ENCOUNTER — TELEPHONE (OUTPATIENT)
Dept: PRIMARY CARE CLINIC | Age: 73
End: 2023-07-31

## 2023-07-31 NOTE — TELEPHONE ENCOUNTER
Form printed - pre-op OV note printed.   Form states pt needs EKG, pt is established with cardiology

## 2023-07-31 NOTE — TELEPHONE ENCOUNTER
\"Blank\" History & Physical Form received from patient in office. Form scanned into media and attached to encounter. Placed in MA folder for completion.

## 2023-07-31 NOTE — TELEPHONE ENCOUNTER
Patient will have cardiology clearance he will need to contact them. We talked in office about this.

## 2023-09-01 NOTE — TELEPHONE ENCOUNTER
Patient called to request a refill on the following prescription:     sacubitril-valsartan (ENTRESTO) 24-26 MG per tablet [[id number]]     Order Details  Dose: 1 tablet Route: Oral Frequency: 2 TIMES DAILY   Dispense Quantity: 60 tablet Refills: 3          Sig: Take 1 tablet by mouth 2 times daily       Pharmacy    [facility] [phone] - F [phone]   [address] [place] [address]   Phone:  [phone]  Fax:  [phone]       Last Appt: 6/16/23   Last Refill: 6/16/23

## 2023-09-07 RX ORDER — SACUBITRIL AND VALSARTAN 24; 26 MG/1; MG/1
TABLET, FILM COATED ORAL
Qty: 180 TABLET | OUTPATIENT
Start: 2023-09-07

## 2023-10-05 DIAGNOSIS — M10.00 IDIOPATHIC GOUT, UNSPECIFIED CHRONICITY, UNSPECIFIED SITE: ICD-10-CM

## 2023-10-05 RX ORDER — ALLOPURINOL 300 MG/1
300 TABLET ORAL DAILY
Qty: 90 TABLET | Refills: 1 | OUTPATIENT
Start: 2023-10-05

## 2023-10-05 RX ORDER — ALLOPURINOL 100 MG/1
100 TABLET ORAL DAILY
Qty: 90 TABLET | Refills: 1 | OUTPATIENT
Start: 2023-10-05

## 2023-10-05 NOTE — TELEPHONE ENCOUNTER
Medication:   Requested Prescriptions     Pending Prescriptions Disp Refills    allopurinol (ZYLOPRIM) 300 MG tablet [Pharmacy Med Name: ALLOPURINOL 300 MG Tablet] 90 tablet 1     Sig: TAKE 1 TABLET EVERY DAY    allopurinol (ZYLOPRIM) 100 MG tablet [Pharmacy Med Name: ALLOPURINOL 100 MG Tablet] 90 tablet 1     Sig: TAKE 1 TABLET EVERY DAY        Last Filled:  6/23/2023 - 90 tab w/ 1 refill *not due yet - refill at pharmacy*    Patient Phone Number: [phone] (home)     Last appt: 7/21/2023 Return if symptoms worsen or fail to improve  Next appt: 10/30/2023    Last OARRS:        No data to display

## 2023-10-05 NOTE — PROGRESS NOTES
use drugs. Family History:  No evidence for sudden cardiac death or premature CAD    Medications:     Home Medications:  Were reviewed and are listed in nursing record. and/or listed below    Prior to Admission medications    Medication Sig Start Date End Date Taking?  Authorizing Provider   spironolactone (ALDACTONE) 25 MG tablet Take 0.5 tablets by mouth daily 10/6/23  Yes Gian Munroe MD   metoprolol succinate (TOPROL XL) 25 MG extended release tablet Take 1 tablet by mouth daily 10/6/23  Yes Gian Munroe MD   sacubitril-valsartan (ENTRESTO) 24-26 MG per tablet Take 1 tablet by mouth 2 times daily 10/6/23  Yes Gian Munroe MD   allopurinol (ZYLOPRIM) 100 MG tablet Take 1 tablet by mouth daily 6/23/23 12/20/23 Yes JOSE Cespedes CNP   allopurinol (ZYLOPRIM) 300 MG tablet Take 1 tablet by mouth daily 6/23/23  Yes JOSE Cespedes CNP   atorvastatin (LIPITOR) 40 MG tablet Take 1 tablet by mouth daily 6/23/23  Yes JOSE Cespedes CNP   metFORMIN (GLUCOPHAGE-XR) 500 MG extended release tablet Take 1 tablet by mouth nightly 6/23/23  Yes JOSE Cespedes CNP   omeprazole (PRILOSEC) 20 MG delayed release capsule Take 1 capsule by mouth 2 times daily 6/23/23  Yes JOSE Cespedes CNP   tamsulosin [facility]) 0.4 MG capsule Take 1 capsule by mouth daily 6/23/23  Yes JOSE Cespedes CNP   ascorbic acid (VITAMIN C) 500 MG tablet Take 1 tablet by mouth daily 2/2/10  Yes Zack oCllins MD   loratadine (CLARITIN REDITABS) 10 MG dissolvable tablet Take 1 tablet by mouth daily   Yes Zack Collins MD   Blood Glucose Monitoring Suppl KIT 1 kit by Does not apply route daily Dispense according to insurance formulary 2/9/22  Yes JOSE Cespedes CNP   blood glucose test strips (ASCENSIA AUTODISC VI;ONE TOUCH ULTRA TEST VI) strip 1 each by In Vitro route daily Test as directed,Dispense according to insurance formulary

## 2023-10-06 ENCOUNTER — OFFICE VISIT (OUTPATIENT)
Dept: CARDIOLOGY CLINIC | Age: 73
End: 2023-10-06
Payer: MEDICARE

## 2023-10-06 VITALS
HEIGHT: 75 IN | HEART RATE: 75 BPM | DIASTOLIC BLOOD PRESSURE: 68 MMHG | WEIGHT: 234.2 LBS | SYSTOLIC BLOOD PRESSURE: 110 MMHG | BODY MASS INDEX: 29.12 KG/M2 | OXYGEN SATURATION: 96 %

## 2023-10-06 DIAGNOSIS — I77.819 AORTIC DILATATION (HCC): ICD-10-CM

## 2023-10-06 DIAGNOSIS — E78.5 HYPERLIPIDEMIA, UNSPECIFIED HYPERLIPIDEMIA TYPE: ICD-10-CM

## 2023-10-06 DIAGNOSIS — R94.31 ABNORMAL EKG: ICD-10-CM

## 2023-10-06 DIAGNOSIS — R91.1 NODULE OF UPPER LOBE OF LEFT LUNG: ICD-10-CM

## 2023-10-06 DIAGNOSIS — I42.9 CARDIOMYOPATHY, UNSPECIFIED TYPE (HCC): ICD-10-CM

## 2023-10-06 DIAGNOSIS — I42.9 CARDIOMYOPATHY, UNSPECIFIED TYPE (HCC): Primary | ICD-10-CM

## 2023-10-06 LAB
ALBUMIN SERPL-MCNC: 4 G/DL (ref 3.4–5)
ALBUMIN/GLOB SERPL: 1.5 {RATIO} (ref 1.1–2.2)
ALP SERPL-CCNC: 150 U/L (ref 40–129)
ALT SERPL-CCNC: 21 U/L (ref 10–40)
ANION GAP SERPL CALCULATED.3IONS-SCNC: 10 MMOL/L (ref 3–16)
AST SERPL-CCNC: 27 U/L (ref 15–37)
BILIRUB SERPL-MCNC: 0.4 MG/DL (ref 0–1)
BUN SERPL-MCNC: 22 MG/DL (ref 7–20)
CALCIUM SERPL-MCNC: 9.4 MG/DL (ref 8.3–10.6)
CHLORIDE SERPL-SCNC: 109 MMOL/L (ref 99–110)
CHOLEST SERPL-MCNC: 112 MG/DL (ref 0–199)
CO2 SERPL-SCNC: 24 MMOL/L (ref 21–32)
CREAT SERPL-MCNC: 1.1 MG/DL (ref 0.8–1.3)
DEPRECATED RDW RBC AUTO: 14.3 % (ref 12.4–15.4)
GFR SERPLBLD CREATININE-BSD FMLA CKD-EPI: >60 ML/MIN/{1.73_M2}
GLUCOSE SERPL-MCNC: 110 MG/DL (ref 70–99)
HCT VFR BLD AUTO: 41.6 % (ref 40.5–52.5)
HDLC SERPL-MCNC: 35 MG/DL (ref 40–60)
HGB BLD-MCNC: 14.3 G/DL (ref 13.5–17.5)
LDLC SERPL CALC-MCNC: 48 MG/DL
MCH RBC QN AUTO: 32 PG (ref 26–34)
MCHC RBC AUTO-ENTMCNC: 34.3 G/DL (ref 31–36)
MCV RBC AUTO: 93.4 FL (ref 80–100)
PLATELET # BLD AUTO: 160 K/UL (ref 135–450)
PMV BLD AUTO: 9.9 FL (ref 5–10.5)
POTASSIUM SERPL-SCNC: 4.3 MMOL/L (ref 3.5–5.1)
PROT SERPL-MCNC: 6.6 G/DL (ref 6.4–8.2)
RBC # BLD AUTO: 4.46 M/UL (ref 4.2–5.9)
SODIUM SERPL-SCNC: 143 MMOL/L (ref 136–145)
TRIGL SERPL-MCNC: 143 MG/DL (ref 0–150)
TSH SERPL DL<=0.005 MIU/L-ACNC: 1.78 UIU/ML (ref 0.27–4.2)
VLDLC SERPL CALC-MCNC: 29 MG/DL
WBC # BLD AUTO: 6.7 K/UL (ref 4–11)

## 2023-10-06 PROCEDURE — G8417 CALC BMI ABV UP PARAM F/U: HCPCS | Performed by: INTERNAL MEDICINE

## 2023-10-06 PROCEDURE — 1036F TOBACCO NON-USER: CPT | Performed by: INTERNAL MEDICINE

## 2023-10-06 PROCEDURE — G8484 FLU IMMUNIZE NO ADMIN: HCPCS | Performed by: INTERNAL MEDICINE

## 2023-10-06 PROCEDURE — 99214 OFFICE O/P EST MOD 30 MIN: CPT | Performed by: INTERNAL MEDICINE

## 2023-10-06 PROCEDURE — 3017F COLORECTAL CA SCREEN DOC REV: CPT | Performed by: INTERNAL MEDICINE

## 2023-10-06 PROCEDURE — 1123F ACP DISCUSS/DSCN MKR DOCD: CPT | Performed by: INTERNAL MEDICINE

## 2023-10-06 PROCEDURE — G8427 DOCREV CUR MEDS BY ELIG CLIN: HCPCS | Performed by: INTERNAL MEDICINE

## 2023-10-06 RX ORDER — METOPROLOL SUCCINATE 25 MG/1
25 TABLET, EXTENDED RELEASE ORAL DAILY
Qty: 30 TABLET | Refills: 5 | Status: SHIPPED | OUTPATIENT
Start: 2023-10-06

## 2023-10-06 RX ORDER — SPIRONOLACTONE 25 MG/1
12.5 TABLET ORAL DAILY
Qty: 30 TABLET | Refills: 5 | Status: SHIPPED | OUTPATIENT
Start: 2023-10-06

## 2023-10-06 NOTE — PATIENT INSTRUCTIONS
Labs as ordered   STOP Lisinopril   BP at goal, continue to check at home. Call if you your blood pressure is >140/90.    Follow up with me in 3 months

## 2023-10-30 ENCOUNTER — OFFICE VISIT (OUTPATIENT)
Dept: PRIMARY CARE CLINIC | Age: 73
End: 2023-10-30
Payer: MEDICARE

## 2023-10-30 VITALS
SYSTOLIC BLOOD PRESSURE: 118 MMHG | TEMPERATURE: 96.3 F | OXYGEN SATURATION: 98 % | HEART RATE: 76 BPM | HEIGHT: 75 IN | BODY MASS INDEX: 29.42 KG/M2 | WEIGHT: 236.6 LBS | DIASTOLIC BLOOD PRESSURE: 70 MMHG

## 2023-10-30 DIAGNOSIS — I42.9 CARDIOMYOPATHY, UNSPECIFIED TYPE (HCC): ICD-10-CM

## 2023-10-30 DIAGNOSIS — Z01.818 PRE-OP EXAM: ICD-10-CM

## 2023-10-30 DIAGNOSIS — R35.0 URINARY FREQUENCY: Primary | ICD-10-CM

## 2023-10-30 DIAGNOSIS — E11.9 TYPE 2 DIABETES MELLITUS WITHOUT COMPLICATION, WITHOUT LONG-TERM CURRENT USE OF INSULIN (HCC): ICD-10-CM

## 2023-10-30 LAB
CREATININE URINE POCT: NORMAL
HBA1C MFR BLD: 6.6 %
MICROALBUMIN/CREAT 24H UR: NORMAL MG/G{CREAT}
MICROALBUMIN/CREAT UR-RTO: NORMAL

## 2023-10-30 PROCEDURE — G8484 FLU IMMUNIZE NO ADMIN: HCPCS | Performed by: NURSE PRACTITIONER

## 2023-10-30 PROCEDURE — 1036F TOBACCO NON-USER: CPT | Performed by: NURSE PRACTITIONER

## 2023-10-30 PROCEDURE — 3044F HG A1C LEVEL LT 7.0%: CPT | Performed by: NURSE PRACTITIONER

## 2023-10-30 PROCEDURE — G8417 CALC BMI ABV UP PARAM F/U: HCPCS | Performed by: NURSE PRACTITIONER

## 2023-10-30 PROCEDURE — 99214 OFFICE O/P EST MOD 30 MIN: CPT | Performed by: NURSE PRACTITIONER

## 2023-10-30 PROCEDURE — G8427 DOCREV CUR MEDS BY ELIG CLIN: HCPCS | Performed by: NURSE PRACTITIONER

## 2023-10-30 PROCEDURE — 83036 HEMOGLOBIN GLYCOSYLATED A1C: CPT | Performed by: NURSE PRACTITIONER

## 2023-10-30 PROCEDURE — 2022F DILAT RTA XM EVC RTNOPTHY: CPT | Performed by: NURSE PRACTITIONER

## 2023-10-30 PROCEDURE — 1123F ACP DISCUSS/DSCN MKR DOCD: CPT | Performed by: NURSE PRACTITIONER

## 2023-10-30 PROCEDURE — 82044 UR ALBUMIN SEMIQUANTITATIVE: CPT | Performed by: NURSE PRACTITIONER

## 2023-10-30 PROCEDURE — 3017F COLORECTAL CA SCREEN DOC REV: CPT | Performed by: NURSE PRACTITIONER

## 2023-10-30 ASSESSMENT — ENCOUNTER SYMPTOMS
RECTAL PAIN: 0
COUGH: 0
WHEEZING: 0
BLOOD IN STOOL: 0
SHORTNESS OF BREATH: 0
DIARRHEA: 0
VOMITING: 0
NAUSEA: 0
CONSTIPATION: 0

## 2023-10-30 NOTE — PROGRESS NOTES
Problem Relation Age of Onset    Arthritis Mother     High Cholesterol Father     Cancer Brother     Heart Attack Paternal Uncle     Prostate Cancer Brother      Social History     Socioeconomic History    Marital status:      Spouse name: Not on file    Number of children: Not on file    Years of education: Not on file    Highest education level: Not on file   Occupational History    Not on file   Tobacco Use    Smoking status: Former     Packs/day: 1.00     Years: 30.00     Additional pack years: 0.00     Total pack years: 30.00     Types: Cigarettes     Start date: 1968     Quit date: 2000     Years since quittin.7    Smokeless tobacco: Never   Vaping Use    Vaping Use: Never used   Substance and Sexual Activity    Alcohol use: Never    Drug use: Never    Sexual activity: Not Currently   Other Topics Concern    Not on file   Social History Narrative    Not on file     Social Determinants of Health     Financial Resource Strain: Low Risk  (2023)    Overall Financial Resource Strain (CARDIA)     Difficulty of Paying Living Expenses: Not hard at all   Food Insecurity: No Food Insecurity (2023)    Hunger Vital Sign     Worried About Running Out of Food in the Last Year: Never true     [address] in the Last Year: Never true   Transportation Needs: Unknown (2023)    PRAPARE - Transportation     Lack of Transportation (Medical): Not on file     Lack of Transportation (Non-Medical):  No   Physical Activity: Insufficiently Active (2023)    Exercise Vital Sign     Days of Exercise per Week: 1 day     Minutes of Exercise per Session: 10 min   Stress: Not on file   Social Connections: Not on file   Intimate Partner Violence: Not At Risk (2022)    Humiliation, Afraid, Rape, and Kick questionnaire     Fear of Current or Ex-Partner: No     Emotionally Abused: No     Physically Abused: No     Sexually Abused: No   Housing Stability: Unknown (2023)    [address] [place] [address]

## 2023-10-31 ENCOUNTER — TELEPHONE (OUTPATIENT)
Dept: PULMONOLOGY | Age: 73
End: 2023-10-31

## 2023-10-31 NOTE — TELEPHONE ENCOUNTER
Lizzy Subramanian had  CT chest w/o contrast done at [facility] this morning, which was ordered by his PCP. He will bring disc with those images to office for review by Dr. Meet Cid so that timing of his next CT scan could be assessed.

## 2023-11-08 ENCOUNTER — TELEPHONE (OUTPATIENT)
Dept: CARDIOLOGY CLINIC | Age: 73
End: 2023-11-08

## 2023-11-08 NOTE — TELEPHONE ENCOUNTER
Pt called to check on the status of a cardiac clearance for his procedure that he believed his urologist had not received. Cardiac Risk Assessment  for Procedures and Surgery    What type of procedure are you having:    Cystoscopy Transurethral Resection Prostate    When is your procedure scheduled for:    11.28.2023    What physician is performing your procedure:    Lashonda Walker    Phone Number:    ?    PT number [phone]    Fax number to send the letter:    ?    Cardiologist    [place]    Last Appointment:    [phone]    Next Appointment:    [phone]    Are you on any blood thinners: If yes what?     Aspirin 81mg ec    History of Coronary Artery Disease:    Last Stress test:    Last echo:    05.31.2023    Device type and :

## 2023-11-11 DIAGNOSIS — E11.9 TYPE 2 DIABETES MELLITUS WITHOUT COMPLICATION, WITHOUT LONG-TERM CURRENT USE OF INSULIN (HCC): ICD-10-CM

## 2023-11-13 RX ORDER — ATORVASTATIN CALCIUM 40 MG/1
40 TABLET, FILM COATED ORAL DAILY
Qty: 90 TABLET | Refills: 1 | OUTPATIENT
Start: 2023-11-13

## 2023-11-14 NOTE — TELEPHONE ENCOUNTER
I reviewed Corby's CT and the solid nodule is stable and benign (dating back to April 2021). The ground glass nodule is also stable, but I have to conclude it to be new on the June 2023 scan. A repeat CT in June of 2024 is appropriate and I should have follow up with Mr. Leslie Horton before then so I can discuss with him.     [facility] scan 10/31/2023

## 2023-11-28 ENCOUNTER — HOSPITAL ENCOUNTER (INPATIENT)
Age: 73
LOS: 2 days | Discharge: HOME | DRG: 714 | End: 2023-11-30
Attending: UROLOGY | Admitting: UROLOGY
Payer: MEDICARE

## 2023-11-28 ENCOUNTER — ANESTHESIA EVENT (OUTPATIENT)
Dept: OPERATING ROOM | Age: 73
DRG: 714 | End: 2023-11-28
Payer: MEDICARE

## 2023-11-28 ENCOUNTER — ANESTHESIA (OUTPATIENT)
Dept: OPERATING ROOM | Age: 73
DRG: 714 | End: 2023-11-28
Payer: MEDICARE

## 2023-11-28 DIAGNOSIS — R35.0 BENIGN PROSTATIC HYPERPLASIA WITH URINARY FREQUENCY: ICD-10-CM

## 2023-11-28 DIAGNOSIS — N40.1 BENIGN PROSTATIC HYPERPLASIA WITH URINARY FREQUENCY: ICD-10-CM

## 2023-11-28 LAB
GLUCOSE BLD-MCNC: 109 MG/DL (ref 70–99)
GLUCOSE BLD-MCNC: 109 MG/DL (ref 70–99)
PERFORMED ON: ABNORMAL
PERFORMED ON: ABNORMAL

## 2023-11-28 PROCEDURE — 3600000014 HC SURGERY LEVEL 4 ADDTL 15MIN: Performed by: UROLOGY

## 2023-11-28 PROCEDURE — 2580000003 HC RX 258: Performed by: UROLOGY

## 2023-11-28 PROCEDURE — 3700000001 HC ADD 15 MINUTES (ANESTHESIA): Performed by: UROLOGY

## 2023-11-28 PROCEDURE — 6360000002 HC RX W HCPCS: Performed by: UROLOGY

## 2023-11-28 PROCEDURE — 2720000010 HC SURG SUPPLY STERILE: Performed by: UROLOGY

## 2023-11-28 PROCEDURE — 3600000004 HC SURGERY LEVEL 4 BASE: Performed by: UROLOGY

## 2023-11-28 PROCEDURE — 7100000001 HC PACU RECOVERY - ADDTL 15 MIN: Performed by: UROLOGY

## 2023-11-28 PROCEDURE — 2580000003 HC RX 258: Performed by: ANESTHESIOLOGY

## 2023-11-28 PROCEDURE — 88305 TISSUE EXAM BY PATHOLOGIST: CPT

## 2023-11-28 PROCEDURE — 1200000000 HC SEMI PRIVATE

## 2023-11-28 PROCEDURE — 6360000002 HC RX W HCPCS: Performed by: NURSE ANESTHETIST, CERTIFIED REGISTERED

## 2023-11-28 PROCEDURE — 0VB08ZZ EXCISION OF PROSTATE, VIA NATURAL OR ARTIFICIAL OPENING ENDOSCOPIC: ICD-10-PCS | Performed by: UROLOGY

## 2023-11-28 PROCEDURE — 2709999900 HC NON-CHARGEABLE SUPPLY: Performed by: UROLOGY

## 2023-11-28 PROCEDURE — A4217 STERILE WATER/SALINE, 500 ML: HCPCS | Performed by: UROLOGY

## 2023-11-28 PROCEDURE — 7100000000 HC PACU RECOVERY - FIRST 15 MIN: Performed by: UROLOGY

## 2023-11-28 PROCEDURE — 3700000000 HC ANESTHESIA ATTENDED CARE: Performed by: UROLOGY

## 2023-11-28 RX ORDER — ACETAMINOPHEN 325 MG/1
650 TABLET ORAL
Status: DISCONTINUED | OUTPATIENT
Start: 2023-11-28 | End: 2023-11-28 | Stop reason: HOSPADM

## 2023-11-28 RX ORDER — SODIUM CHLORIDE 0.9 % (FLUSH) 0.9 %
5-40 SYRINGE (ML) INJECTION PRN
Status: DISCONTINUED | OUTPATIENT
Start: 2023-11-28 | End: 2023-11-28 | Stop reason: HOSPADM

## 2023-11-28 RX ORDER — PROPOFOL 10 MG/ML
INJECTION, EMULSION INTRAVENOUS PRN
Status: DISCONTINUED | OUTPATIENT
Start: 2023-11-28 | End: 2023-11-28 | Stop reason: SDUPTHER

## 2023-11-28 RX ORDER — IPRATROPIUM BROMIDE AND ALBUTEROL SULFATE 2.5; .5 MG/3ML; MG/3ML
1 SOLUTION RESPIRATORY (INHALATION)
Status: DISCONTINUED | OUTPATIENT
Start: 2023-11-28 | End: 2023-11-28 | Stop reason: HOSPADM

## 2023-11-28 RX ORDER — SODIUM CHLORIDE 9 MG/ML
INJECTION, SOLUTION INTRAVENOUS PRN
Status: DISCONTINUED | OUTPATIENT
Start: 2023-11-28 | End: 2023-11-28 | Stop reason: HOSPADM

## 2023-11-28 RX ORDER — SODIUM CHLORIDE, SODIUM LACTATE, POTASSIUM CHLORIDE, CALCIUM CHLORIDE 600; 310; 30; 20 MG/100ML; MG/100ML; MG/100ML; MG/100ML
INJECTION, SOLUTION INTRAVENOUS CONTINUOUS
Status: DISCONTINUED | OUTPATIENT
Start: 2023-11-28 | End: 2023-11-28 | Stop reason: HOSPADM

## 2023-11-28 RX ORDER — FENTANYL CITRATE 50 UG/ML
INJECTION, SOLUTION INTRAMUSCULAR; INTRAVENOUS PRN
Status: DISCONTINUED | OUTPATIENT
Start: 2023-11-28 | End: 2023-11-28 | Stop reason: SDUPTHER

## 2023-11-28 RX ORDER — LABETALOL HYDROCHLORIDE 5 MG/ML
10 INJECTION, SOLUTION INTRAVENOUS
Status: DISCONTINUED | OUTPATIENT
Start: 2023-11-28 | End: 2023-11-28 | Stop reason: HOSPADM

## 2023-11-28 RX ORDER — HYDROMORPHONE HYDROCHLORIDE 1 MG/ML
0.5 INJECTION, SOLUTION INTRAMUSCULAR; INTRAVENOUS; SUBCUTANEOUS EVERY 5 MIN PRN
Status: DISCONTINUED | OUTPATIENT
Start: 2023-11-28 | End: 2023-11-28 | Stop reason: HOSPADM

## 2023-11-28 RX ORDER — ONDANSETRON 2 MG/ML
INJECTION INTRAMUSCULAR; INTRAVENOUS PRN
Status: DISCONTINUED | OUTPATIENT
Start: 2023-11-28 | End: 2023-11-28 | Stop reason: SDUPTHER

## 2023-11-28 RX ORDER — DOCUSATE SODIUM 100 MG/1
100 CAPSULE, LIQUID FILLED ORAL 2 TIMES DAILY
Status: DISCONTINUED | OUTPATIENT
Start: 2023-11-28 | End: 2023-11-30 | Stop reason: HOSPADM

## 2023-11-28 RX ORDER — CIPROFLOXACIN 2 MG/ML
400 INJECTION, SOLUTION INTRAVENOUS ONCE
Status: COMPLETED | OUTPATIENT
Start: 2023-11-28 | End: 2023-11-28

## 2023-11-28 RX ORDER — CIPROFLOXACIN 2 MG/ML
400 INJECTION, SOLUTION INTRAVENOUS EVERY 12 HOURS
Status: DISCONTINUED | OUTPATIENT
Start: 2023-11-29 | End: 2023-11-30 | Stop reason: HOSPADM

## 2023-11-28 RX ORDER — SODIUM CHLORIDE 0.9 % (FLUSH) 0.9 %
5-40 SYRINGE (ML) INJECTION EVERY 12 HOURS SCHEDULED
Status: DISCONTINUED | OUTPATIENT
Start: 2023-11-28 | End: 2023-11-28 | Stop reason: HOSPADM

## 2023-11-28 RX ORDER — SODIUM CHLORIDE, SODIUM LACTATE, POTASSIUM CHLORIDE, CALCIUM CHLORIDE 600; 310; 30; 20 MG/100ML; MG/100ML; MG/100ML; MG/100ML
INJECTION, SOLUTION INTRAVENOUS CONTINUOUS
Status: DISCONTINUED | OUTPATIENT
Start: 2023-11-28 | End: 2023-11-30 | Stop reason: HOSPADM

## 2023-11-28 RX ORDER — PROCHLORPERAZINE EDISYLATE 5 MG/ML
5 INJECTION INTRAMUSCULAR; INTRAVENOUS
Status: DISCONTINUED | OUTPATIENT
Start: 2023-11-28 | End: 2023-11-28 | Stop reason: HOSPADM

## 2023-11-28 RX ORDER — FENTANYL CITRATE 50 UG/ML
25 INJECTION, SOLUTION INTRAMUSCULAR; INTRAVENOUS EVERY 5 MIN PRN
Status: DISCONTINUED | OUTPATIENT
Start: 2023-11-28 | End: 2023-11-28 | Stop reason: HOSPADM

## 2023-11-28 RX ORDER — MORPHINE SULFATE 2 MG/ML
2 INJECTION, SOLUTION INTRAMUSCULAR; INTRAVENOUS
Status: DISCONTINUED | OUTPATIENT
Start: 2023-11-28 | End: 2023-11-30 | Stop reason: HOSPADM

## 2023-11-28 RX ORDER — ONDANSETRON 2 MG/ML
4 INJECTION INTRAMUSCULAR; INTRAVENOUS
Status: DISCONTINUED | OUTPATIENT
Start: 2023-11-28 | End: 2023-11-28 | Stop reason: HOSPADM

## 2023-11-28 RX ORDER — MAGNESIUM HYDROXIDE 1200 MG/15ML
LIQUID ORAL CONTINUOUS PRN
Status: DISCONTINUED | OUTPATIENT
Start: 2023-11-28 | End: 2023-11-28 | Stop reason: HOSPADM

## 2023-11-28 RX ORDER — LIDOCAINE HYDROCHLORIDE 20 MG/ML
INJECTION, SOLUTION INTRAVENOUS PRN
Status: DISCONTINUED | OUTPATIENT
Start: 2023-11-28 | End: 2023-11-28 | Stop reason: SDUPTHER

## 2023-11-28 RX ADMIN — MORPHINE SULFATE 2 MG: 2 INJECTION, SOLUTION INTRAMUSCULAR; INTRAVENOUS at 21:03

## 2023-11-28 RX ADMIN — FENTANYL CITRATE 50 MCG: 50 INJECTION, SOLUTION INTRAMUSCULAR; INTRAVENOUS at 12:55

## 2023-11-28 RX ADMIN — CIPROFLOXACIN 400 MG: 400 INJECTION, SOLUTION INTRAVENOUS at 12:35

## 2023-11-28 RX ADMIN — SODIUM CHLORIDE, POTASSIUM CHLORIDE, SODIUM LACTATE AND CALCIUM CHLORIDE: 600; 310; 30; 20 INJECTION, SOLUTION INTRAVENOUS at 11:23

## 2023-11-28 RX ADMIN — ONDANSETRON 4 MG: 2 INJECTION INTRAMUSCULAR; INTRAVENOUS at 12:41

## 2023-11-28 RX ADMIN — LIDOCAINE HYDROCHLORIDE 50 MG: 20 INJECTION, SOLUTION INTRAVENOUS at 12:41

## 2023-11-28 RX ADMIN — PHENYLEPHRINE HYDROCHLORIDE 100 MCG: 10 INJECTION, SOLUTION INTRAMUSCULAR; INTRAVENOUS; SUBCUTANEOUS at 12:57

## 2023-11-28 RX ADMIN — PHENYLEPHRINE HYDROCHLORIDE 100 MCG: 10 INJECTION, SOLUTION INTRAMUSCULAR; INTRAVENOUS; SUBCUTANEOUS at 13:12

## 2023-11-28 RX ADMIN — SODIUM CHLORIDE, POTASSIUM CHLORIDE, SODIUM LACTATE AND CALCIUM CHLORIDE: 600; 310; 30; 20 INJECTION, SOLUTION INTRAVENOUS at 16:05

## 2023-11-28 RX ADMIN — PHENYLEPHRINE HYDROCHLORIDE 100 MCG: 10 INJECTION, SOLUTION INTRAMUSCULAR; INTRAVENOUS; SUBCUTANEOUS at 13:11

## 2023-11-28 RX ADMIN — PROPOFOL 200 MG: 10 INJECTION, EMULSION INTRAVENOUS at 12:41

## 2023-11-28 RX ADMIN — FENTANYL CITRATE 50 MCG: 50 INJECTION, SOLUTION INTRAMUSCULAR; INTRAVENOUS at 12:43

## 2023-11-28 ASSESSMENT — PAIN SCALES - GENERAL
PAINLEVEL_OUTOF10: 0
PAINLEVEL_OUTOF10: 9

## 2023-11-28 ASSESSMENT — PAIN - FUNCTIONAL ASSESSMENT
PAIN_FUNCTIONAL_ASSESSMENT: 0-10
PAIN_FUNCTIONAL_ASSESSMENT: ACTIVITIES ARE NOT PREVENTED

## 2023-11-28 ASSESSMENT — PAIN DESCRIPTION - ORIENTATION: ORIENTATION: RIGHT;LEFT

## 2023-11-28 ASSESSMENT — PAIN DESCRIPTION - DESCRIPTORS: DESCRIPTORS: ACHING;SHARP

## 2023-11-28 ASSESSMENT — PAIN DESCRIPTION - LOCATION: LOCATION: ELBOW;SHOULDER

## 2023-11-28 ASSESSMENT — PAIN DESCRIPTION - PAIN TYPE: TYPE: ACUTE PAIN;CHRONIC PAIN

## 2023-11-28 NOTE — BRIEF OP NOTE
Brief Postoperative Note      Patient: Frieda Bruner  YOB: 1950  MRN: [id number]    Date of Procedure: 11/28/2023    Pre-Op Diagnosis Codes: * Benign prostatic hyperplasia with urinary frequency [N40.1, R35.0]    Post-Op Diagnosis: Same       Procedure(s):  CYSTOSCOPY, BIPOLAR TRANSURETHRAL RESECTION OF PROSTATE    Surgeon(s):  Karime Trivedi MD    Assistant:  * No surgical staff found *    Anesthesia: General    Estimated Blood Loss (mL): less than 50     Complications: None    Specimens:   ID Type Source Tests Collected by Time Destination   A : PROSTATE CHIPS Tissue Tissue SURGICAL PATHOLOGY Karime Trivedi MD 11/28/2023 1302        Implants:  * No implants in log *      Drains:   Urinary Catheter 11/28/23 3 Way; Prabhakar (Active)   Urine Color Yellow 11/28/23 1349   Urine Appearance Clear 11/28/23 1349   Collection Container Standard 11/28/23 1349   Securement Method Securing device (Describe) 11/28/23 1349   Catheter Best Practices  Drainage tube clipped to bed;Lack of dependent loop in tubing;Drainage bag less than half full;Catheter secured to thigh; Tamper seal intact; Bag below bladder;Bag not on floor 11/28/23 1349   Status Continuous bladder irrigation;Draining 11/28/23 1349       Findings: large prostate, median lobe. Significant bladder trabeculations    PLAN: VOIDING TRIAL IN AM IF URINE CLEAR.       Electronically signed by Karime Trivedi MD on 11/28/2023 at 1:50 PM

## 2023-11-28 NOTE — PROGRESS NOTES
4 Eyes Skin Assessment     NAME:  Frieda Bruner  YOB: 1950  MEDICAL RECORD NUMBER:  [id number]    The patient is being assessed for  Admission    I agree that at least one RN has performed a thorough Head to Toe Skin Assessment on the patient. ALL assessment sites listed below have been assessed. Areas assessed by both nurses:    Head, Face, Ears, Shoulders, Back, Chest, Arms, Elbows, Hands, Sacrum. Buttock, Coccyx, Ischium, Legs. Feet and Heels, and Under Medical Devices         Does the Patient have a Wound?  No noted wound(s)       Jabier Prevention initiated by RN: No  Wound Care Orders initiated by RN: No    Pressure Injury (Stage 3,4, Unstageable, DTI, NWPT, and Complex wounds) if present, place Wound referral order by RN under : No    New Ostomies, if present place, Ostomy referral order under : No     Nurse 1 eSignature: Electronically signed by Mika Mccullough RN on 11/28/23 at 6:39 PM EST    **SHARE this note so that the co-signing nurse can place an eSignature**    Nurse 2 eSignature: Electronically signed by Marcelino Card RN on 11/28/23 at 7:31 PM EST

## 2023-11-28 NOTE — H&P
10/06/2023 10:30 AM     PT/INR:  No results found for: \"PROTIME\", \"INR\"  PTT:  No results found for: \"APTT\"[APTT        Impression/Plan: 76 yo with bph and LUTs  -here for TURP. -consent signed. Wishes to proceed.     Adriel Tello MD

## 2023-11-28 NOTE — PROGRESS NOTES
CBI output turned dark cherry and bloody despite not adjusting the gtt rate. Dr Lashonda Walker notified. Ordered to irrigate. Irrigate 60ml x3 with clot seen flowing out after second irrigation.  Will conitnue to monitor closely

## 2023-11-28 NOTE — PROGRESS NOTES
Patient CBI running wide open with cherry red urine. Small clots noted. Patient with no discomfort with galindo. Tolerating diet.    Electronically signed by Merlin Galvan RN on 11/28/2023 at 6:40 PM

## 2023-11-28 NOTE — ANESTHESIA POSTPROCEDURE EVALUATION
Department of Anesthesiology  Postprocedure Note    Patient: Deann Manley  MRN: [id number]  YOB: 1950  Date of evaluation: 11/28/2023      Procedure Summary     Date: 11/28/23 Room / Location: [address] [place] [address]    Anesthesia Start: 9324 Anesthesia Stop: 0793    Procedure: CYSTOSCOPY, BIPOLAR TRANSURETHRAL RESECTION OF PROSTATE Diagnosis:       Benign prostatic hyperplasia with urinary frequency      (Benign prostatic hyperplasia with urinary frequency [N40.1, R35.0])    Surgeons: Ritu Christensen MD Responsible Provider: Tamar Small MD    Anesthesia Type: general ASA Status: 3          Anesthesia Type: No value filed.     Audra Phase I: Audra Score: 8    Audra Phase II:        Anesthesia Post Evaluation    Patient location during evaluation: PACU  Level of consciousness: awake  Complications: no  Multimodal analgesia pain management approach

## 2023-11-28 NOTE — PROGRESS NOTES
Patient ADMITTED to room 5314 from pacu. Patient is A&O x 4. VSS. Patient oriented to the room all safety measures in place. Patient given IS and SCDs at this time. Admission orders released and patient 4 eyes completed. Admission documentation completed. No other needs are noted at this time.     [x] Bed alarm on and cord plugged into wall  [x] Bed in lowest position  [x] Call light and bedside table within reach  [x] Patient educated on all safety measures  []Oxygen connected to wall (if applicable)     Nurse 1 Esignature: Electronically signed by Courtney Jenkins RN on 11/28/23 at 6:39 PM EST  Nurse 2 Esignature: Electronically signed by Jared Bahena RN on 11/28/23 at 7:31 PM EST

## 2023-11-29 PROBLEM — N13.8 BPH WITH OBSTRUCTION/LOWER URINARY TRACT SYMPTOMS: Status: ACTIVE | Noted: 2023-11-29

## 2023-11-29 PROBLEM — N40.1 BPH WITH OBSTRUCTION/LOWER URINARY TRACT SYMPTOMS: Status: ACTIVE | Noted: 2023-11-29

## 2023-11-29 LAB
ANION GAP SERPL CALCULATED.3IONS-SCNC: 8 MMOL/L (ref 3–16)
BUN SERPL-MCNC: 14 MG/DL (ref 7–20)
CALCIUM SERPL-MCNC: 8.6 MG/DL (ref 8.3–10.6)
CHLORIDE SERPL-SCNC: 106 MMOL/L (ref 99–110)
CO2 SERPL-SCNC: 25 MMOL/L (ref 21–32)
CREAT SERPL-MCNC: 1.1 MG/DL (ref 0.8–1.3)
DEPRECATED RDW RBC AUTO: 14.3 % (ref 12.4–15.4)
GFR SERPLBLD CREATININE-BSD FMLA CKD-EPI: >60 ML/MIN/{1.73_M2}
GLUCOSE SERPL-MCNC: 127 MG/DL (ref 70–99)
HCT VFR BLD AUTO: 34.3 % (ref 40.5–52.5)
HGB BLD-MCNC: 11.6 G/DL (ref 13.5–17.5)
MCH RBC QN AUTO: 31.6 PG (ref 26–34)
MCHC RBC AUTO-ENTMCNC: 33.8 G/DL (ref 31–36)
MCV RBC AUTO: 93.4 FL (ref 80–100)
PLATELET # BLD AUTO: 122 K/UL (ref 135–450)
PMV BLD AUTO: 8.3 FL (ref 5–10.5)
POTASSIUM SERPL-SCNC: 4 MMOL/L (ref 3.5–5.1)
RBC # BLD AUTO: 3.67 M/UL (ref 4.2–5.9)
SODIUM SERPL-SCNC: 139 MMOL/L (ref 136–145)
WBC # BLD AUTO: 7.3 K/UL (ref 4–11)

## 2023-11-29 PROCEDURE — 6370000000 HC RX 637 (ALT 250 FOR IP): Performed by: UROLOGY

## 2023-11-29 PROCEDURE — 2580000003 HC RX 258: Performed by: UROLOGY

## 2023-11-29 PROCEDURE — 6370000000 HC RX 637 (ALT 250 FOR IP): Performed by: PHYSICIAN ASSISTANT

## 2023-11-29 PROCEDURE — 6360000002 HC RX W HCPCS: Performed by: UROLOGY

## 2023-11-29 PROCEDURE — 80048 BASIC METABOLIC PNL TOTAL CA: CPT

## 2023-11-29 PROCEDURE — 36415 COLL VENOUS BLD VENIPUNCTURE: CPT

## 2023-11-29 PROCEDURE — 85027 COMPLETE CBC AUTOMATED: CPT

## 2023-11-29 PROCEDURE — 1200000000 HC SEMI PRIVATE

## 2023-11-29 RX ORDER — ONDANSETRON 2 MG/ML
4 INJECTION INTRAMUSCULAR; INTRAVENOUS EVERY 6 HOURS PRN
Status: DISCONTINUED | OUTPATIENT
Start: 2023-11-29 | End: 2023-11-30 | Stop reason: HOSPADM

## 2023-11-29 RX ORDER — SPIRONOLACTONE 25 MG/1
25 TABLET ORAL DAILY
Status: DISCONTINUED | OUTPATIENT
Start: 2023-11-29 | End: 2023-11-30 | Stop reason: HOSPADM

## 2023-11-29 RX ORDER — ACETAMINOPHEN 325 MG/1
650 TABLET ORAL EVERY 4 HOURS PRN
Status: DISCONTINUED | OUTPATIENT
Start: 2023-11-29 | End: 2023-11-30 | Stop reason: HOSPADM

## 2023-11-29 RX ORDER — METOPROLOL SUCCINATE 25 MG/1
25 TABLET, EXTENDED RELEASE ORAL NIGHTLY
Status: DISCONTINUED | OUTPATIENT
Start: 2023-11-30 | End: 2023-11-30 | Stop reason: HOSPADM

## 2023-11-29 RX ADMIN — SACUBITRIL AND VALSARTAN 1 TABLET: 24; 26 TABLET, FILM COATED ORAL at 12:56

## 2023-11-29 RX ADMIN — SACUBITRIL AND VALSARTAN 1 TABLET: 24; 26 TABLET, FILM COATED ORAL at 19:57

## 2023-11-29 RX ADMIN — SODIUM CHLORIDE, POTASSIUM CHLORIDE, SODIUM LACTATE AND CALCIUM CHLORIDE: 600; 310; 30; 20 INJECTION, SOLUTION INTRAVENOUS at 00:53

## 2023-11-29 RX ADMIN — CIPROFLOXACIN 400 MG: 2 INJECTION, SOLUTION INTRAVENOUS at 00:54

## 2023-11-29 RX ADMIN — MORPHINE SULFATE 2 MG: 2 INJECTION, SOLUTION INTRAMUSCULAR; INTRAVENOUS at 01:05

## 2023-11-29 RX ADMIN — SODIUM CHLORIDE, POTASSIUM CHLORIDE, SODIUM LACTATE AND CALCIUM CHLORIDE: 600; 310; 30; 20 INJECTION, SOLUTION INTRAVENOUS at 23:07

## 2023-11-29 RX ADMIN — SPIRONOLACTONE 25 MG: 25 TABLET, FILM COATED ORAL at 12:56

## 2023-11-29 RX ADMIN — SODIUM CHLORIDE, POTASSIUM CHLORIDE, SODIUM LACTATE AND CALCIUM CHLORIDE: 600; 310; 30; 20 INJECTION, SOLUTION INTRAVENOUS at 11:04

## 2023-11-29 RX ADMIN — CIPROFLOXACIN 400 MG: 2 INJECTION, SOLUTION INTRAVENOUS at 12:53

## 2023-11-29 RX ADMIN — DOCUSATE SODIUM 100 MG: 100 CAPSULE, LIQUID FILLED ORAL at 10:05

## 2023-11-29 ASSESSMENT — PAIN DESCRIPTION - DESCRIPTORS: DESCRIPTORS: ACHING

## 2023-11-29 ASSESSMENT — PAIN DESCRIPTION - ONSET: ONSET: ON-GOING

## 2023-11-29 ASSESSMENT — PAIN DESCRIPTION - PAIN TYPE: TYPE: ACUTE PAIN;CHRONIC PAIN

## 2023-11-29 ASSESSMENT — PAIN SCALES - GENERAL
PAINLEVEL_OUTOF10: 4
PAINLEVEL_OUTOF10: 3
PAINLEVEL_OUTOF10: 9
PAINLEVEL_OUTOF10: 3

## 2023-11-29 ASSESSMENT — PAIN DESCRIPTION - ORIENTATION: ORIENTATION: LEFT

## 2023-11-29 ASSESSMENT — PAIN DESCRIPTION - LOCATION: LOCATION: SHOULDER

## 2023-11-29 ASSESSMENT — PAIN - FUNCTIONAL ASSESSMENT: PAIN_FUNCTIONAL_ASSESSMENT: ACTIVITIES ARE NOT PREVENTED

## 2023-11-29 ASSESSMENT — PAIN DESCRIPTION - FREQUENCY: FREQUENCY: CONTINUOUS

## 2023-11-29 NOTE — CARE COORDINATION
Case Management Assessment  Initial Evaluation    Date/Time of Evaluation: 11/29/2023 1:58 PM  Assessment Completed by: Maureen Torres RN    If patient is discharged prior to next notation, then this note serves as note for discharge by case management. Patient Name: Bear Bob                   YOB: 1950  Diagnosis: Benign prostatic hyperplasia with urinary frequency [N40.1, R35.0]  BPH with obstruction/lower urinary tract symptoms [N40.1, N13.8]                   Date / Time: 11/28/2023 10:37 AM    Patient Admission Status: Inpatient   Readmission Risk (Low < 19, Mod (19-27), High > 27): Readmission Risk Score: 7.9    Current PCP: Faylene Cushing, APRN - CNP  PCP verified by CM? Yes    Chart Reviewed: Yes      History Provided by: Patient  Patient Orientation: Alert and Oriented    Patient Cognition: Alert    Hospitalization in the last 30 days (Readmission):  No    If yes, Readmission Assessment in CM Navigator will be completed. Advance Directives:      Code Status: Full Code   Patient's Primary Decision Maker is:  (BRING DOS)    Primary Decision Maker: Audrey Penn - Spouse - [phone]    Discharge Planning:    Patient lives with: Spouse/Significant Other Type of Home: House  Primary Care Giver: Self  Patient Support Systems include: Spouse/Significant Other   Current Financial resources: Medicare  Current community resources: None  Current services prior to admission: None            Current DME:              Type of Home Care services:  None    ADLS  Prior functional level: Independent in ADLs/IADLs  Current functional level: Independent in ADLs/IADLs    PT AM-PAC:   /24  OT AM-PAC:   /24    Family can provide assistance at DC: Yes  Would you like Case Management to discuss the discharge plan with any other family members/significant others, and if so, who?  No  Plans to Return to Present Housing: Yes  Other Identified Issues/Barriers to RETURNING to current housing: n/a  Potential

## 2023-11-29 NOTE — PROGRESS NOTES
Patient alert and oriented. VSS Patient c/o pain, Morphine given. CBI in place running wide open. Prabhakar care done. Assessment done. see flowsheet. Patient in bed lowest position call light and bedside table within reach. All needs are met at this time. Patient aware to call if any help needed. Will continue to monitor

## 2023-11-29 NOTE — PROGRESS NOTES
11/29/23 1438   Encounter Summary   Encounter Overview/Reason  Spiritual/Emotional Needs   Service Provided For: Patient   Referral/Consult From: [facility] [place]   Support System Spouse; Children;Family members   Last Encounter  11/29/23  (visit w/pt, support, blessing ke 11/29/23)   Complexity of Encounter Moderate   Begin Time 1400   End Time  1415   Total Time Calculated 15 min   Spiritual/Emotional needs   Type Spiritual Support   Rituals, Rites and Sacraments   Type Blessings   Assessment/Intervention/Outcome   Assessment Calm;Coping; Hopeful   Intervention Active listening;Discussed belief system/Temple practices/antwan; Explored/Affirmed feelings, thoughts, concerns;Prayer (assurance of)/[place]   Outcome Acceptance;Expressed feelings of Suha, Peace and/or Love;Expressed Gratitude     [employer]

## 2023-11-29 NOTE — PLAN OF CARE
Problem: Safety - Adult  Goal: Free from fall injury  Outcome: Progressing  Note: Patient remains free from physical injury. Fall precautions in place: Patient in bed lowest position,wheels locked. 2/4 side rails up Call light and beside table within reach. Will continue to monitor       Problem: Pain  Goal: Verbalizes/displays adequate comfort level or baseline comfort level  Note: Patient c/o pain rated as 9 out of 10 Morphine given per protocol. Upon reassessment patient states being satisfied.  Will continue to monitor

## 2023-11-29 NOTE — PROGRESS NOTES
When getting patient up to walk in the tripathi had him holding onto a walker while I controlled the IV pole behind him. Pt does not typically use any assistive devices but wanted a walker to start with. Pt still has 3-way galindo and CBI running. Pt felt stable and denies any dizziness or lightheaded. [place] was too low for patient he went to grab the IV pole to hold onto and the wall when he felt caught up in the galindo/CBI tubing and was able to catch him to the wall but unable to keep him from sliding down the wall. So he was lowered to the floor by myself and the wall. Pt did not hit his head or any other part of his body. Charge nurse was called, Bethany ROLLE and [place] helped me get pt back on his feet. We adjusted the walker to fit a better height and pt was able to tolerated walking approx 100 ft are returned to chair. Chair alarm activated and call light within reach.

## 2023-11-30 VITALS
HEART RATE: 83 BPM | RESPIRATION RATE: 16 BRPM | DIASTOLIC BLOOD PRESSURE: 70 MMHG | OXYGEN SATURATION: 96 % | HEIGHT: 75 IN | WEIGHT: 228 LBS | TEMPERATURE: 97.3 F | SYSTOLIC BLOOD PRESSURE: 106 MMHG | BODY MASS INDEX: 28.35 KG/M2

## 2023-11-30 LAB
ANION GAP SERPL CALCULATED.3IONS-SCNC: 8 MMOL/L (ref 3–16)
BUN SERPL-MCNC: 13 MG/DL (ref 7–20)
CALCIUM SERPL-MCNC: 8.7 MG/DL (ref 8.3–10.6)
CHLORIDE SERPL-SCNC: 101 MMOL/L (ref 99–110)
CO2 SERPL-SCNC: 25 MMOL/L (ref 21–32)
CREAT SERPL-MCNC: 1 MG/DL (ref 0.8–1.3)
DEPRECATED RDW RBC AUTO: 14.4 % (ref 12.4–15.4)
GFR SERPLBLD CREATININE-BSD FMLA CKD-EPI: >60 ML/MIN/{1.73_M2}
GLUCOSE SERPL-MCNC: 155 MG/DL (ref 70–99)
HCT VFR BLD AUTO: 33.3 % (ref 40.5–52.5)
HGB BLD-MCNC: 11.4 G/DL (ref 13.5–17.5)
MCH RBC QN AUTO: 31.7 PG (ref 26–34)
MCHC RBC AUTO-ENTMCNC: 34.4 G/DL (ref 31–36)
MCV RBC AUTO: 92.2 FL (ref 80–100)
PLATELET # BLD AUTO: 157 K/UL (ref 135–450)
PMV BLD AUTO: 8.3 FL (ref 5–10.5)
POTASSIUM SERPL-SCNC: 3.7 MMOL/L (ref 3.5–5.1)
RBC # BLD AUTO: 3.61 M/UL (ref 4.2–5.9)
SODIUM SERPL-SCNC: 134 MMOL/L (ref 136–145)
WBC # BLD AUTO: 10.3 K/UL (ref 4–11)

## 2023-11-30 PROCEDURE — 51798 US URINE CAPACITY MEASURE: CPT

## 2023-11-30 PROCEDURE — 80048 BASIC METABOLIC PNL TOTAL CA: CPT

## 2023-11-30 PROCEDURE — 2580000003 HC RX 258: Performed by: UROLOGY

## 2023-11-30 PROCEDURE — 85027 COMPLETE CBC AUTOMATED: CPT

## 2023-11-30 PROCEDURE — 6360000002 HC RX W HCPCS: Performed by: UROLOGY

## 2023-11-30 PROCEDURE — 36415 COLL VENOUS BLD VENIPUNCTURE: CPT

## 2023-11-30 PROCEDURE — 6370000000 HC RX 637 (ALT 250 FOR IP): Performed by: PHYSICIAN ASSISTANT

## 2023-11-30 RX ORDER — CIPROFLOXACIN 500 MG/1
500 TABLET, FILM COATED ORAL 2 TIMES DAILY
Qty: 14 TABLET | Refills: 0 | Status: SHIPPED | OUTPATIENT
Start: 2023-11-30 | End: 2023-12-07

## 2023-11-30 RX ADMIN — CIPROFLOXACIN 400 MG: 2 INJECTION, SOLUTION INTRAVENOUS at 12:41

## 2023-11-30 RX ADMIN — CIPROFLOXACIN 400 MG: 2 INJECTION, SOLUTION INTRAVENOUS at 00:34

## 2023-11-30 RX ADMIN — METOPROLOL SUCCINATE 25 MG: 25 TABLET, EXTENDED RELEASE ORAL at 08:39

## 2023-11-30 RX ADMIN — SODIUM CHLORIDE, POTASSIUM CHLORIDE, SODIUM LACTATE AND CALCIUM CHLORIDE: 600; 310; 30; 20 INJECTION, SOLUTION INTRAVENOUS at 11:24

## 2023-11-30 RX ADMIN — SPIRONOLACTONE 25 MG: 25 TABLET, FILM COATED ORAL at 08:40

## 2023-11-30 RX ADMIN — MORPHINE SULFATE 2 MG: 2 INJECTION, SOLUTION INTRAMUSCULAR; INTRAVENOUS at 03:37

## 2023-11-30 RX ADMIN — SACUBITRIL AND VALSARTAN 1 TABLET: 24; 26 TABLET, FILM COATED ORAL at 08:40

## 2023-11-30 ASSESSMENT — PAIN DESCRIPTION - DESCRIPTORS
DESCRIPTORS: ACHING
DESCRIPTORS: ACHING
DESCRIPTORS: ACHING;SHARP
DESCRIPTORS: ACHING

## 2023-11-30 ASSESSMENT — PAIN DESCRIPTION - PAIN TYPE
TYPE: CHRONIC PAIN

## 2023-11-30 ASSESSMENT — PAIN - FUNCTIONAL ASSESSMENT
PAIN_FUNCTIONAL_ASSESSMENT: ACTIVITIES ARE NOT PREVENTED

## 2023-11-30 ASSESSMENT — PAIN DESCRIPTION - ONSET
ONSET: ON-GOING

## 2023-11-30 ASSESSMENT — PAIN DESCRIPTION - ORIENTATION
ORIENTATION: RIGHT

## 2023-11-30 ASSESSMENT — PAIN DESCRIPTION - LOCATION
LOCATION: HAND
LOCATION: SHOULDER
LOCATION: HAND
LOCATION: HAND

## 2023-11-30 ASSESSMENT — PAIN DESCRIPTION - FREQUENCY
FREQUENCY: CONTINUOUS

## 2023-11-30 ASSESSMENT — PAIN SCALES - GENERAL
PAINLEVEL_OUTOF10: 0
PAINLEVEL_OUTOF10: 9
PAINLEVEL_OUTOF10: 2

## 2023-11-30 NOTE — PROGRESS NOTES
Pt is A&Ox4. VSS on RA with exceptions of sinus tach; MD is notified. Pt is staying one more night due to 3-way galindo clamp trial showing dark red urine output. 1x loose BM, large/brown. CBI remains running at a slow rate with pink output with some red flecking. Pt returned to bed. Bed locked and in lowest position with alarm activated. Call light within reach.

## 2023-11-30 NOTE — DISCHARGE SUMMARY
Urology Discharge Summary      Patient Identification  Roderick Boyd is a 75 y.o. male. 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 Kristine Aragon course: Patient was admitted to [facility] for procedure as stated above. Surgery went as planned with no complications. Patient was kept inpatient post-op. He developed a low grade fever and was given IV antibiotics. Prabhakar catheter was removed in the morning and patient was discharged in stable condition. He will follow back in 3-4 weeks for post-op check with Dr Harmony Foster.      [facility] [place]

## 2023-11-30 NOTE — CARE COORDINATION
Case Management Assessment            Discharge Note                    Date / Time of Note: 11/30/2023 10:59 AM                  Discharge Note Completed by: Raimundo Posadas RN    Patient Name: Eddie Smiley   YOB: 1950  Diagnosis: Benign prostatic hyperplasia with urinary frequency [N40.1, R35.0]  BPH with obstruction/lower urinary tract symptoms [N40.1, N13.8]   Date / Time: 11/28/2023 10:37 AM    Current PCP: JOSE Capps CNP  Clinic patient: No    Hospitalization in the last 30 days: No       Advance Directives:  Code Status: Full Code  [place] DNR form completed and on chart: No    Financial:  Payor: Charlotte Farias / Plan: [address] [place] [address] HMO / Product Type: *No Product type* /      Pharmacy:    [facility] [phone] - F [phone]  [address] [facility] [address]  Phone: [phone] Fax: [phone]    [facility] [id number] [facility], [address] [facility] [address] [facility] Unknown Phy [phone]  [address] [facility]  [facility] [address]  Phone: [phone] Fax: [phone]      Assistance purchasing medications?: Potential Assistance Purchasing Medications: No  Assistance provided by Case Management: None at this time    Does patient want to participate in local refill/ meds to beds program?:      Meds To Beds General Rules:  1. Can ONLY be done Monday- Friday between 8:30am-5pm  2. Prescription(s) must be in pharmacy by 3pm to be filled same day  3. Copy of patient's insurance/ prescription drug card and patient face sheet must be sent along with the prescription(s)  4. Cost of Rx cannot be added to hospital bill. If financial assistance is needed, please contact unit  or ;  or  CANNOT provide pharmacy voucher for patients co-pays  5.  Patients can then  the prescription on their way out of the hospital at discharge, or pharmacy can

## 2023-11-30 NOTE — DISCHARGE INSTRUCTIONS
Please call [phone] ([facility]) with any questions/concerns. It is normal to see blood in the urine for up to 6 weeks following procedure. Please avoid heavy lifting/strenuous exercise for 3-4 weeks. If you are unable to urinate for an extended period of time please call the office. We will see you in 1 month for post-op check.

## 2023-11-30 NOTE — PROGRESS NOTES
[address] catheter inserted with no issues. 10ml saline placed into balloon.      Electronically signed by Meek Coombs RN on 11/30/2023 at 3:29 PM

## 2023-12-01 NOTE — PLAN OF CARE
Problem: Chronic Conditions and Co-morbidities  Goal: Patient's chronic conditions and co-morbidity symptoms are monitored and maintained or improved  Outcome: Adequate for Discharge     Problem: Discharge Planning  Goal: Discharge to home or other facility with appropriate resources  Outcome: Adequate for Discharge     Problem: Pain  Goal: Verbalizes/displays adequate comfort level or baseline comfort level  Outcome: Adequate for Discharge     Problem: ABCDS Injury Assessment  Goal: Absence of physical injury  Outcome: Adequate for Discharge     Problem: Safety - Adult  Goal: Free from fall injury  Outcome: Adequate for Discharge

## 2023-12-01 NOTE — PROGRESS NOTES
Pt A&Ox4; VSS on RA. Galindo and CBI stopped by urology. Pt to void and have < 500ml with post voiding bladder scan. Pt voided multiple times with several episodes of diarrhea. Bladder scan still showing a residual of 841ml; Urology notified and orders to replace galindo with a leg bag and allow pt to discharge. Pt explained to call Urology office in the morning to make followup apt. All discharge instructions explained and questions answered. Pt wheeled down to main doors to meet wife with all belongings. Paper prescription for Ciprofloxacin handed to pt.

## 2023-12-04 ENCOUNTER — TELEPHONE (OUTPATIENT)
Dept: PRIMARY CARE CLINIC | Age: 73
End: 2023-12-04

## 2023-12-04 NOTE — TELEPHONE ENCOUNTER
Care Transitions Initial Follow Up Call    Outreach made within 2 business days of discharge: No  Patient does not need OV follow up with PCP, he is to follow up the urology: Follow-up Information    Carina Soni MD Urology Call in 1 day [facility] [address] [facility]  [phone]      Patient: Madi Bridges Patient : 1950   MRN: [id number]  Reason for Admission: There are no discharge diagnoses documented for the most recent discharge.   Discharge Date: 23           Scheduled appointment with PCP within 7-14 days    Follow Up  Future Appointments   Date Time Provider [address]   2024 10:00 AM MD THEE Marquez CARDIO [facility]   2024  9:00 AM MD Fara Stout P/CC [facility]   2024 10:00 AM JOSE Maxwell - CNP [facility]       Jeff Gonzalez MA

## 2023-12-08 ENCOUNTER — OFFICE VISIT (OUTPATIENT)
Dept: PRIMARY CARE CLINIC | Age: 73
End: 2023-12-08
Payer: MEDICARE

## 2023-12-08 VITALS
DIASTOLIC BLOOD PRESSURE: 62 MMHG | BODY MASS INDEX: 28.72 KG/M2 | HEART RATE: 98 BPM | WEIGHT: 231 LBS | TEMPERATURE: 98.1 F | SYSTOLIC BLOOD PRESSURE: 122 MMHG | OXYGEN SATURATION: 96 % | RESPIRATION RATE: 18 BRPM | HEIGHT: 75 IN

## 2023-12-08 DIAGNOSIS — M25.641 STIFFNESS OF RIGHT HAND JOINT: ICD-10-CM

## 2023-12-08 DIAGNOSIS — R20.0 NUMBNESS AND TINGLING IN RIGHT HAND: Primary | ICD-10-CM

## 2023-12-08 DIAGNOSIS — R20.2 NUMBNESS AND TINGLING IN RIGHT HAND: Primary | ICD-10-CM

## 2023-12-08 PROCEDURE — 99214 OFFICE O/P EST MOD 30 MIN: CPT | Performed by: NURSE PRACTITIONER

## 2023-12-08 PROCEDURE — 36415 COLL VENOUS BLD VENIPUNCTURE: CPT | Performed by: NURSE PRACTITIONER

## 2023-12-08 PROCEDURE — 1123F ACP DISCUSS/DSCN MKR DOCD: CPT | Performed by: NURSE PRACTITIONER

## 2023-12-08 PROCEDURE — 1111F DSCHRG MED/CURRENT MED MERGE: CPT | Performed by: NURSE PRACTITIONER

## 2023-12-08 PROCEDURE — 3017F COLORECTAL CA SCREEN DOC REV: CPT | Performed by: NURSE PRACTITIONER

## 2023-12-08 PROCEDURE — 1036F TOBACCO NON-USER: CPT | Performed by: NURSE PRACTITIONER

## 2023-12-08 PROCEDURE — G8427 DOCREV CUR MEDS BY ELIG CLIN: HCPCS | Performed by: NURSE PRACTITIONER

## 2023-12-08 PROCEDURE — G8484 FLU IMMUNIZE NO ADMIN: HCPCS | Performed by: NURSE PRACTITIONER

## 2023-12-08 PROCEDURE — G8417 CALC BMI ABV UP PARAM F/U: HCPCS | Performed by: NURSE PRACTITIONER

## 2023-12-08 RX ORDER — PREDNISONE 10 MG/1
TABLET ORAL
Qty: 55 TABLET | Refills: 0 | Status: SHIPPED | OUTPATIENT
Start: 2023-12-08

## 2023-12-08 ASSESSMENT — PATIENT HEALTH QUESTIONNAIRE - PHQ9
SUM OF ALL RESPONSES TO PHQ QUESTIONS 1-9: 0
SUM OF ALL RESPONSES TO PHQ QUESTIONS 1-9: 0
2. FEELING DOWN, DEPRESSED OR HOPELESS: 0
SUM OF ALL RESPONSES TO PHQ QUESTIONS 1-9: 0
SUM OF ALL RESPONSES TO PHQ QUESTIONS 1-9: 0
1. LITTLE INTEREST OR PLEASURE IN DOING THINGS: 0
SUM OF ALL RESPONSES TO PHQ9 QUESTIONS 1 & 2: 0

## 2023-12-08 NOTE — PROGRESS NOTES
PROGRESS NOTE  Date of Service:  12/8/2023  Address: [address] [place] [address] PRIMARY CARE  [address] [place] [address] [address] [place] [address]  Dept: [phone]  Loc: [phone]    Subjective:      Patient ID: [id number]  Alexandra Veliz is a 75 y.o. male    HPI: patient is here for pain in his right hand started since 11/28. Patient said his right hand is painful and his shoulder and arms. Patient said shoulder hurt a little but not as bad at the hospital. Patient said that he can not button his shirt. He said that is just his right hand. Review of Systems   Constitutional:  Negative for chills, fatigue and fever. Musculoskeletal:  Positive for joint swelling. All other systems reviewed and are negative. Objective:   Physical Exam  Vitals reviewed. Constitutional:       Appearance: Normal appearance. Cardiovascular:      Rate and Rhythm: Normal rate and regular rhythm. Pulses: Normal pulses. Heart sounds: Normal heart sounds. Pulmonary:      Effort: Pulmonary effort is normal.      Breath sounds: Normal breath sounds. Musculoskeletal:      Right wrist: Tenderness present. Decreased range of motion. Right hand: Tenderness present. Decreased range of motion. Skin:     Capillary Refill: Capillary refill takes less than 2 seconds. Neurological:      General: No focal deficit present. Mental Status: He is alert and oriented to person, place, and time. Psychiatric:         Mood and Affect: Mood normal.         Behavior: Behavior normal.         Thought Content: Thought content normal.         Judgment: Judgment normal.         Plan:   1. Numbness and tingling in right hand will for patient to Dr. Deana Brewster for numbness patient has been told he has gout in the past.  Will start on steroids. Patient said that his pain started in his shoulders. But more of his hand right hand. Concern for polymyalgia rheumatica we will get sed rate and CRP.   -     [facility]

## 2023-12-09 LAB
CRP SERPL-MCNC: 8.9 MG/L (ref 0–5.1)
ERYTHROCYTE [SEDIMENTATION RATE] IN BLOOD BY WESTERGREN METHOD: 42 MM/HR (ref 0–20)
URATE SERPL-MCNC: 5 MG/DL (ref 3.5–7.2)

## 2023-12-11 DIAGNOSIS — M35.3 PMR (POLYMYALGIA RHEUMATICA) (HCC): Primary | ICD-10-CM

## 2024-01-09 ENCOUNTER — OFFICE VISIT (OUTPATIENT)
Dept: PULMONOLOGY | Age: 74
End: 2024-01-09
Payer: MEDICARE

## 2024-01-09 VITALS
DIASTOLIC BLOOD PRESSURE: 70 MMHG | HEART RATE: 84 BPM | HEIGHT: 75 IN | BODY MASS INDEX: 27.98 KG/M2 | OXYGEN SATURATION: 99 % | SYSTOLIC BLOOD PRESSURE: 108 MMHG | WEIGHT: 225 LBS | RESPIRATION RATE: 16 BRPM

## 2024-01-09 DIAGNOSIS — R91.1 NODULE OF UPPER LOBE OF LEFT LUNG: Primary | ICD-10-CM

## 2024-01-09 PROCEDURE — G8484 FLU IMMUNIZE NO ADMIN: HCPCS | Performed by: INTERNAL MEDICINE

## 2024-01-09 PROCEDURE — 1036F TOBACCO NON-USER: CPT | Performed by: INTERNAL MEDICINE

## 2024-01-09 PROCEDURE — 99213 OFFICE O/P EST LOW 20 MIN: CPT | Performed by: INTERNAL MEDICINE

## 2024-01-09 PROCEDURE — G8427 DOCREV CUR MEDS BY ELIG CLIN: HCPCS | Performed by: INTERNAL MEDICINE

## 2024-01-09 PROCEDURE — 3017F COLORECTAL CA SCREEN DOC REV: CPT | Performed by: INTERNAL MEDICINE

## 2024-01-09 PROCEDURE — G8417 CALC BMI ABV UP PARAM F/U: HCPCS | Performed by: INTERNAL MEDICINE

## 2024-01-09 PROCEDURE — 1123F ACP DISCUSS/DSCN MKR DOCD: CPT | Performed by: INTERNAL MEDICINE

## 2024-01-09 NOTE — PROGRESS NOTES
(PRILOSEC) 20 MG delayed release capsule TAKE 1 CAPSULE TWICE DAILY 180 capsule 3    predniSONE (DELTASONE) 10 MG tablet 5 tabs for 5 days, 4 tabs po daily for 4 days, then 3 po daily for 3 days, then 2 po daily for 2 days, then 1 po daily for 1 day 55 tablet 0    spironolactone (ALDACTONE) 25 MG tablet Take 0.5 tablets by mouth daily 30 tablet 5    metoprolol succinate (TOPROL XL) 25 MG extended release tablet Take 1 tablet by mouth daily 30 tablet 5    sacubitril-valsartan (ENTRESTO) 24-26 MG per tablet Take 1 tablet by mouth 2 times daily 60 tablet 3    allopurinol (ZYLOPRIM) 300 MG tablet Take 1 tablet by mouth daily 90 tablet 1    atorvastatin (LIPITOR) 40 MG tablet Take 1 tablet by mouth daily 90 tablet 1    metFORMIN (GLUCOPHAGE-XR) 500 MG extended release tablet Take 1 tablet by mouth nightly 90 tablet 1    tamsulosin (FLOMAX) 0.4 MG capsule Take 1 capsule by mouth daily 90 capsule 1    ascorbic acid (VITAMIN C) 500 MG tablet Take 1 tablet by mouth daily      loratadine (CLARITIN REDITABS) 10 MG dissolvable tablet Take 1 tablet by mouth daily      Blood Glucose Monitoring Suppl KIT 1 kit by Does not apply route daily Dispense according to insurance formulary 1 kit 0    blood glucose test strips (ASCENSIA AUTODISC VI;ONE TOUCH ULTRA TEST VI) strip 1 each by In Vitro route daily Test as directed,Dispense according to insurance formulary 300 each 1    aspirin 81 MG EC tablet Take 1 tablet by mouth daily      Krill Oil 300 MG CAPS Take 300 mg by mouth nightly      Multiple Vitamins-Minerals (OCUVITE-LUTEIN) CAPS multivitamin Take 1 capsule by mouth daily      allopurinol (ZYLOPRIM) 100 MG tablet Take 1 tablet by mouth daily 90 tablet 1     No current facility-administered medications on file prior to visit.       Allergies:  No Known Allergies    REVIEW OF SYSTEMS:    CONSTITUTIONAL: Negative for fevers and chills  HEENT: Negative for oropharyngeal exudate, post nasal drip, sinus pain / pressure, nasal

## 2024-01-10 ENCOUNTER — OFFICE VISIT (OUTPATIENT)
Dept: CARDIOLOGY CLINIC | Age: 74
End: 2024-01-10
Payer: MEDICARE

## 2024-01-10 VITALS
HEART RATE: 102 BPM | WEIGHT: 221 LBS | SYSTOLIC BLOOD PRESSURE: 104 MMHG | HEIGHT: 75 IN | BODY MASS INDEX: 27.48 KG/M2 | DIASTOLIC BLOOD PRESSURE: 62 MMHG

## 2024-01-10 DIAGNOSIS — I77.819 AORTIC DILATATION (HCC): ICD-10-CM

## 2024-01-10 DIAGNOSIS — R94.31 ABNORMAL EKG: ICD-10-CM

## 2024-01-10 DIAGNOSIS — I42.9 CARDIOMYOPATHY, UNSPECIFIED TYPE (HCC): Primary | ICD-10-CM

## 2024-01-10 DIAGNOSIS — E78.5 HYPERLIPIDEMIA, UNSPECIFIED HYPERLIPIDEMIA TYPE: ICD-10-CM

## 2024-01-10 PROCEDURE — G8484 FLU IMMUNIZE NO ADMIN: HCPCS | Performed by: INTERNAL MEDICINE

## 2024-01-10 PROCEDURE — 1123F ACP DISCUSS/DSCN MKR DOCD: CPT | Performed by: INTERNAL MEDICINE

## 2024-01-10 PROCEDURE — 3017F COLORECTAL CA SCREEN DOC REV: CPT | Performed by: INTERNAL MEDICINE

## 2024-01-10 PROCEDURE — 99214 OFFICE O/P EST MOD 30 MIN: CPT | Performed by: INTERNAL MEDICINE

## 2024-01-10 PROCEDURE — G8417 CALC BMI ABV UP PARAM F/U: HCPCS | Performed by: INTERNAL MEDICINE

## 2024-01-10 PROCEDURE — G8427 DOCREV CUR MEDS BY ELIG CLIN: HCPCS | Performed by: INTERNAL MEDICINE

## 2024-01-10 PROCEDURE — 1036F TOBACCO NON-USER: CPT | Performed by: INTERNAL MEDICINE

## 2024-01-10 RX ORDER — METHYLPREDNISOLONE 4 MG/1
TABLET ORAL
COMMUNITY
Start: 2023-12-20

## 2024-01-10 NOTE — PROGRESS NOTES
[facility]     Outpatient Cardiology         Patient Name:  Corby Benton  Requesting Physician: No admitting provider for patient encounter.  Primary Care Physician: Melisa Barger APRN - CNP    Chief Complaint:   Chief Complaint   Patient presents with    3 Month Follow-Up    Coronary Artery Disease    Cardiomyopathy    Hyperlipidemia       HPI:     Corby Benton is a 73 y.o. male with a history of DM, CAD, cardiomyopathy, GERD, HLD, OA and BPH.     Today he reports that he has been feeling well. He monitors his blood pressure at home and his log shows this to be at goal in the 120-130s/60-70s. Patient currently denies any weight gain, edema, palpitations, chest pain, shortness of breath, dizziness, and syncope.     He was initially referred by Melisa Barger APRN - CNP for an abnormal EKG and pre-op clearance prior to a urological procedure. [facility] EF of 35-40%. His echo from 5/31/23, showed an EF of 45-50%. His CT Chest 6/23/23 showed a 15 mm spiculated nodule in the let upper lobe concerning for primary lung CA. He followed up with Dr. Walter and will have a repeat scan in 6 months.     cysto TRUSP scheduled 5/19/2023 at [facility].     [facility] Yun    Histories:     Past Medical History:   has a past medical history of Anemia, BPH (benign prostatic hyperplasia), Cancer (HCC), CHF (congestive heart failure) (HCC), Diabetes mellitus (HCC), GERD (gastroesophageal reflux disease), Gout, Hyperlipidemia, Macular degeneration, Osteoarthritis, Wears dentures, and Wears glasses.    Surgical History:   has a past surgical history that includes Hemorrhoid surgery; Skin cancer excision; Finger nail surgery (Left, 11/22/2022); eye surgery; Eye surgery (Left); and TURP (N/A, 11/28/2023).     Social History:   reports that he quit smoking about 23 years ago. His smoking use included cigarettes. He started

## 2024-01-10 NOTE — PATIENT INSTRUCTIONS
Overall doing well  Labs as ordered   Echocardiogram   Labs with PCP   Follow up with me in 6 months

## 2024-01-18 RX ORDER — TAMSULOSIN HYDROCHLORIDE 0.4 MG/1
0.4 CAPSULE ORAL DAILY
Qty: 90 CAPSULE | Refills: 3 | OUTPATIENT
Start: 2024-01-18

## 2024-01-18 NOTE — TELEPHONE ENCOUNTER
Spoke with patient regarding medication, he is no longer taking, no need for refill.    No further action is needed for this encounter.

## 2024-01-26 DIAGNOSIS — M10.00 IDIOPATHIC GOUT, UNSPECIFIED CHRONICITY, UNSPECIFIED SITE: ICD-10-CM

## 2024-01-26 NOTE — TELEPHONE ENCOUNTER
Medication:   Requested Prescriptions     Pending Prescriptions Disp Refills    allopurinol (ZYLOPRIM) 300 MG tablet [Pharmacy Med Name: ALLOPURINOL 300 MG Tablet] 90 tablet 3     Sig: TAKE 1 TABLET EVERY DAY    allopurinol (ZYLOPRIM) 100 MG tablet [Pharmacy Med Name: ALLOPURINOL 100 MG Tablet] 90 tablet 3     Sig: TAKE 1 TABLET EVERY DAY        Last Filled:  [id number]    Patient Phone Number: [phone] (home)     Last appt: 12/8/2023   Next appt: 6/24/2024    Last OARRS:        No data to display

## 2024-01-29 RX ORDER — ALLOPURINOL 300 MG/1
300 TABLET ORAL DAILY
Qty: 90 TABLET | Refills: 3 | Status: SHIPPED | OUTPATIENT
Start: 2024-01-29

## 2024-01-29 RX ORDER — ALLOPURINOL 100 MG/1
100 TABLET ORAL DAILY
Qty: 90 TABLET | Refills: 3 | Status: SHIPPED | OUTPATIENT
Start: 2024-01-29

## 2024-02-29 RX ORDER — METOPROLOL SUCCINATE 25 MG/1
25 TABLET, EXTENDED RELEASE ORAL DAILY
Qty: 90 TABLET | Refills: 3 | Status: SHIPPED | OUTPATIENT
Start: 2024-02-29

## 2024-03-15 RX ORDER — SACUBITRIL AND VALSARTAN 24; 26 MG/1; MG/1
1 TABLET, FILM COATED ORAL 2 TIMES DAILY
Qty: 180 TABLET | Refills: 3 | Status: SHIPPED | OUTPATIENT
Start: 2024-03-15

## 2024-04-07 DIAGNOSIS — E11.9 TYPE 2 DIABETES MELLITUS WITHOUT COMPLICATION, WITHOUT LONG-TERM CURRENT USE OF INSULIN (HCC): ICD-10-CM

## 2024-04-08 RX ORDER — ATORVASTATIN CALCIUM 40 MG/1
40 TABLET, FILM COATED ORAL DAILY
Qty: 90 TABLET | Refills: 3 | Status: SHIPPED | OUTPATIENT
Start: 2024-04-08

## 2024-04-08 NOTE — TELEPHONE ENCOUNTER
Medication:   Requested Prescriptions     Pending Prescriptions Disp Refills    atorvastatin (LIPITOR) 40 MG tablet [Pharmacy Med Name: ATORVASTATIN CALCIUM 40 MG Tablet] 90 tablet 3     Sig: TAKE 1 TABLET EVERY DAY       Last Filled: [id number]     Patient Phone Number: [phone] (home)     Last appt: 12/8/2023   Next appt: 6/24/2024 Medicare AWV    Last Lipid:   Lab Results   Component Value Date/Time    CHOL 112 10/06/2023 10:30 AM    TRIG 143 10/06/2023 10:30 AM    HDL 35 10/06/2023 10:30 AM    LDLCALC 48 10/06/2023 10:30 AM

## 2024-04-16 ENCOUNTER — PROCEDURE VISIT (OUTPATIENT)
Dept: CARDIOLOGY CLINIC | Age: 74
End: 2024-04-16
Payer: MEDICARE

## 2024-04-16 DIAGNOSIS — I77.819 AORTIC DILATATION (HCC): ICD-10-CM

## 2024-04-16 DIAGNOSIS — E78.5 HYPERLIPIDEMIA, UNSPECIFIED HYPERLIPIDEMIA TYPE: ICD-10-CM

## 2024-04-16 DIAGNOSIS — I42.9 CARDIOMYOPATHY, UNSPECIFIED TYPE (HCC): ICD-10-CM

## 2024-04-16 DIAGNOSIS — R94.31 ABNORMAL EKG: ICD-10-CM

## 2024-04-16 PROCEDURE — 93306 TTE W/DOPPLER COMPLETE: CPT | Performed by: INTERNAL MEDICINE

## 2024-05-31 DIAGNOSIS — E11.9 TYPE 2 DIABETES MELLITUS WITHOUT COMPLICATION, WITHOUT LONG-TERM CURRENT USE OF INSULIN (HCC): ICD-10-CM

## 2024-05-31 RX ORDER — METFORMIN HYDROCHLORIDE 500 MG/1
500 TABLET, EXTENDED RELEASE ORAL NIGHTLY
Qty: 90 TABLET | Refills: 1 | Status: CANCELLED | OUTPATIENT
Start: 2024-05-31

## 2024-06-03 DIAGNOSIS — E11.9 TYPE 2 DIABETES MELLITUS WITHOUT COMPLICATION, WITHOUT LONG-TERM CURRENT USE OF INSULIN (HCC): ICD-10-CM

## 2024-06-03 RX ORDER — METFORMIN HYDROCHLORIDE 500 MG/1
500 TABLET, EXTENDED RELEASE ORAL NIGHTLY
Qty: 90 TABLET | Refills: 1 | Status: SHIPPED | OUTPATIENT
Start: 2024-06-03

## 2024-06-19 RX ORDER — CELECOXIB 200 MG/1
CAPSULE ORAL
COMMUNITY
Start: 2024-06-02

## 2024-06-19 RX ORDER — METHOTREXATE 2.5 MG/1
TABLET ORAL
COMMUNITY
Start: 2024-05-28

## 2024-06-19 NOTE — PROGRESS NOTES
Component Value Date    WBC 10.3 11/30/2023    HGB 11.4 (L) 11/30/2023    HCT 33.3 (L) 11/30/2023    MCV 92.2 11/30/2023     11/30/2023     Lab Results   Component Value Date     06/24/2024    K 5.4 (H) 06/24/2024     06/24/2024    CO2 27 06/24/2024    BUN 23 (H) 06/24/2024    CREATININE 1.0 06/24/2024    GLUCOSE 111 (H) 06/24/2024    CALCIUM 10.2 06/24/2024    BILITOT 0.6 06/24/2024    ALKPHOS 136 (H) 06/24/2024    AST 29 06/24/2024    ALT 31 06/24/2024    LABGLOM 79 06/24/2024    GFRAA >60 07/25/2022    AGRATIO 1.6 06/24/2024         Lab Results   Component Value Date    CHOL 112 10/06/2023    CHOL 104 07/25/2022    CHOL 107 08/04/2021     Lab Results   Component Value Date    TRIG 143 10/06/2023    TRIG 146 07/25/2022    TRIG 159 08/04/2021     Lab Results   Component Value Date    HDL 35 (L) 10/06/2023    HDL 37 (L) 07/25/2022    HDL 25 (A) 08/04/2021     No components found for: \"LDLCHOLESTEROL\", \"LDLCALC\"    Lab Results   Component Value Date    VLDL 29 10/06/2023    VLDL 29 07/25/2022     No results found for: \"CHOLHDLRATIO\"    No results found for: \"INR\", \"PROTIME\"    The ASCVD Risk score ([place] DK, et al., 2019) failed to calculate for the following reasons:    The valid total cholesterol range is 130 to 320 mg/dL      Imaging:       ECG (if available, Personally interpreted):    Last Monitor/Holter (if available):    Last Stress (if available):    [facility]      Last TTE/ARABELLA 4/2024  Summary   Left ventricular cavity size is dilated.   Normal left ventricular wall thickness.   Left ventricular function is reduced with ejection fraction estimated at 35-40%.   Diffuse left ventricular hypokinesis.   Diastolic filling parameters suggest grade I diastolic dysfunction.   Mild mitral regurgitation is present.   The aortic root is mildly dilated measuring 3.8 cm. The ascending aorta is   mildly dilated measuring 3.9 cm.   Estimated pulmonary artery systolic pressure is at 23 mmHg

## 2024-06-21 SDOH — ECONOMIC STABILITY: FOOD INSECURITY: WITHIN THE PAST 12 MONTHS, YOU WORRIED THAT YOUR FOOD WOULD RUN OUT BEFORE YOU GOT MONEY TO BUY MORE.: NEVER TRUE

## 2024-06-21 SDOH — ECONOMIC STABILITY: INCOME INSECURITY: HOW HARD IS IT FOR YOU TO PAY FOR THE VERY BASICS LIKE FOOD, HOUSING, MEDICAL CARE, AND HEATING?: NOT HARD AT ALL

## 2024-06-21 SDOH — ECONOMIC STABILITY: TRANSPORTATION INSECURITY
IN THE PAST 12 MONTHS, HAS LACK OF TRANSPORTATION KEPT YOU FROM MEETINGS, WORK, OR FROM GETTING THINGS NEEDED FOR DAILY LIVING?: NO

## 2024-06-21 SDOH — HEALTH STABILITY: PHYSICAL HEALTH: ON AVERAGE, HOW MANY DAYS PER WEEK DO YOU ENGAGE IN MODERATE TO STRENUOUS EXERCISE (LIKE A BRISK WALK)?: 3 DAYS

## 2024-06-21 SDOH — HEALTH STABILITY: PHYSICAL HEALTH: ON AVERAGE, HOW MANY MINUTES DO YOU ENGAGE IN EXERCISE AT THIS LEVEL?: 10 MIN

## 2024-06-21 SDOH — ECONOMIC STABILITY: FOOD INSECURITY: WITHIN THE PAST 12 MONTHS, THE FOOD YOU BOUGHT JUST DIDN'T LAST AND YOU DIDN'T HAVE MONEY TO GET MORE.: NEVER TRUE

## 2024-06-21 ASSESSMENT — PATIENT HEALTH QUESTIONNAIRE - PHQ9
SUM OF ALL RESPONSES TO PHQ QUESTIONS 1-9: 0
SUM OF ALL RESPONSES TO PHQ QUESTIONS 1-9: 0
2. FEELING DOWN, DEPRESSED OR HOPELESS: NOT AT ALL
SUM OF ALL RESPONSES TO PHQ9 QUESTIONS 1 & 2: 0
SUM OF ALL RESPONSES TO PHQ QUESTIONS 1-9: 0
1. LITTLE INTEREST OR PLEASURE IN DOING THINGS: NOT AT ALL
SUM OF ALL RESPONSES TO PHQ QUESTIONS 1-9: 0

## 2024-06-21 ASSESSMENT — LIFESTYLE VARIABLES
HOW OFTEN DO YOU HAVE A DRINK CONTAINING ALCOHOL: 1
HOW OFTEN DO YOU HAVE SIX OR MORE DRINKS ON ONE OCCASION: 1
HOW MANY STANDARD DRINKS CONTAINING ALCOHOL DO YOU HAVE ON A TYPICAL DAY: 0
HOW OFTEN DO YOU HAVE A DRINK CONTAINING ALCOHOL: NEVER
HOW MANY STANDARD DRINKS CONTAINING ALCOHOL DO YOU HAVE ON A TYPICAL DAY: PATIENT DOES NOT DRINK

## 2024-06-24 ENCOUNTER — OFFICE VISIT (OUTPATIENT)
Dept: PRIMARY CARE CLINIC | Age: 74
End: 2024-06-24
Payer: MEDICARE

## 2024-06-24 VITALS
WEIGHT: 216.4 LBS | HEART RATE: 75 BPM | SYSTOLIC BLOOD PRESSURE: 120 MMHG | OXYGEN SATURATION: 95 % | TEMPERATURE: 97.8 F | DIASTOLIC BLOOD PRESSURE: 72 MMHG | BODY MASS INDEX: 26.91 KG/M2 | RESPIRATION RATE: 16 BRPM | HEIGHT: 75 IN

## 2024-06-24 DIAGNOSIS — Z00.00 ENCOUNTER FOR ANNUAL WELLNESS VISIT (AWV) IN MEDICARE PATIENT: ICD-10-CM

## 2024-06-24 DIAGNOSIS — R89.9 ABNORMAL LABORATORY TEST RESULT: ICD-10-CM

## 2024-06-24 DIAGNOSIS — E11.9 TYPE 2 DIABETES MELLITUS WITHOUT COMPLICATION, WITHOUT LONG-TERM CURRENT USE OF INSULIN (HCC): ICD-10-CM

## 2024-06-24 DIAGNOSIS — Z13.6 SCREENING FOR AAA (ABDOMINAL AORTIC ANEURYSM): ICD-10-CM

## 2024-06-24 DIAGNOSIS — K21.9 GASTROESOPHAGEAL REFLUX DISEASE, UNSPECIFIED WHETHER ESOPHAGITIS PRESENT: Primary | ICD-10-CM

## 2024-06-24 LAB
25(OH)D3 SERPL-MCNC: 56.6 NG/ML
ALBUMIN SERPL-MCNC: 4.2 G/DL (ref 3.4–5)
ALBUMIN/GLOB SERPL: 1.6 {RATIO} (ref 1.1–2.2)
ALP SERPL-CCNC: 136 U/L (ref 40–129)
ALT SERPL-CCNC: 31 U/L (ref 10–40)
ANION GAP SERPL CALCULATED.3IONS-SCNC: 10 MMOL/L (ref 3–16)
AST SERPL-CCNC: 29 U/L (ref 15–37)
BILIRUB SERPL-MCNC: 0.6 MG/DL (ref 0–1)
BUN SERPL-MCNC: 23 MG/DL (ref 7–20)
CALCIUM SERPL-MCNC: 10.2 MG/DL (ref 8.3–10.6)
CHLORIDE SERPL-SCNC: 107 MMOL/L (ref 99–110)
CO2 SERPL-SCNC: 27 MMOL/L (ref 21–32)
CREAT SERPL-MCNC: 1 MG/DL (ref 0.8–1.3)
GFR SERPLBLD CREATININE-BSD FMLA CKD-EPI: 79 ML/MIN/{1.73_M2}
GLUCOSE SERPL-MCNC: 111 MG/DL (ref 70–99)
MAGNESIUM SERPL-MCNC: 1.8 MG/DL (ref 1.8–2.4)
POTASSIUM SERPL-SCNC: 5.4 MMOL/L (ref 3.5–5.1)
PROT SERPL-MCNC: 6.8 G/DL (ref 6.4–8.2)
SODIUM SERPL-SCNC: 144 MMOL/L (ref 136–145)
VIT B12 SERPL-MCNC: 668 PG/ML (ref 211–911)

## 2024-06-24 PROCEDURE — 3046F HEMOGLOBIN A1C LEVEL >9.0%: CPT | Performed by: NURSE PRACTITIONER

## 2024-06-24 PROCEDURE — 3017F COLORECTAL CA SCREEN DOC REV: CPT | Performed by: NURSE PRACTITIONER

## 2024-06-24 PROCEDURE — 36415 COLL VENOUS BLD VENIPUNCTURE: CPT | Performed by: NURSE PRACTITIONER

## 2024-06-24 PROCEDURE — G0439 PPPS, SUBSEQ VISIT: HCPCS | Performed by: NURSE PRACTITIONER

## 2024-06-24 PROCEDURE — 1123F ACP DISCUSS/DSCN MKR DOCD: CPT | Performed by: NURSE PRACTITIONER

## 2024-06-24 SDOH — ECONOMIC STABILITY: FOOD INSECURITY: WITHIN THE PAST 12 MONTHS, THE FOOD YOU BOUGHT JUST DIDN'T LAST AND YOU DIDN'T HAVE MONEY TO GET MORE.: NEVER TRUE

## 2024-06-24 SDOH — ECONOMIC STABILITY: FOOD INSECURITY: WITHIN THE PAST 12 MONTHS, YOU WORRIED THAT YOUR FOOD WOULD RUN OUT BEFORE YOU GOT MONEY TO BUY MORE.: NEVER TRUE

## 2024-06-24 SDOH — ECONOMIC STABILITY: INCOME INSECURITY: HOW HARD IS IT FOR YOU TO PAY FOR THE VERY BASICS LIKE FOOD, HOUSING, MEDICAL CARE, AND HEATING?: NOT HARD AT ALL

## 2024-06-24 ASSESSMENT — ENCOUNTER SYMPTOMS
DIARRHEA: 0
CONSTIPATION: 0
CHEST TIGHTNESS: 0
BLOOD IN STOOL: 0
SHORTNESS OF BREATH: 0

## 2024-06-24 ASSESSMENT — PATIENT HEALTH QUESTIONNAIRE - PHQ9
SUM OF ALL RESPONSES TO PHQ QUESTIONS 1-9: 0
SUM OF ALL RESPONSES TO PHQ QUESTIONS 1-9: 0
2. FEELING DOWN, DEPRESSED OR HOPELESS: NOT AT ALL
1. LITTLE INTEREST OR PLEASURE IN DOING THINGS: NOT AT ALL
SUM OF ALL RESPONSES TO PHQ QUESTIONS 1-9: 0
SUM OF ALL RESPONSES TO PHQ9 QUESTIONS 1 & 2: 0
SUM OF ALL RESPONSES TO PHQ QUESTIONS 1-9: 0

## 2024-06-25 LAB
EST. AVERAGE GLUCOSE BLD GHB EST-MCNC: 122.6 MG/DL
HBA1C MFR BLD: 5.9 %

## 2024-06-26 ENCOUNTER — OFFICE VISIT (OUTPATIENT)
Dept: CARDIOLOGY CLINIC | Age: 74
End: 2024-06-26
Payer: MEDICARE

## 2024-06-26 VITALS
OXYGEN SATURATION: 96 % | SYSTOLIC BLOOD PRESSURE: 120 MMHG | HEART RATE: 80 BPM | BODY MASS INDEX: 27 KG/M2 | DIASTOLIC BLOOD PRESSURE: 80 MMHG | WEIGHT: 216 LBS

## 2024-06-26 DIAGNOSIS — I42.9 CARDIOMYOPATHY, UNSPECIFIED TYPE (HCC): Primary | ICD-10-CM

## 2024-06-26 DIAGNOSIS — I77.819 AORTIC DILATATION (HCC): ICD-10-CM

## 2024-06-26 DIAGNOSIS — E78.5 HYPERLIPIDEMIA, UNSPECIFIED HYPERLIPIDEMIA TYPE: ICD-10-CM

## 2024-06-26 PROCEDURE — 1123F ACP DISCUSS/DSCN MKR DOCD: CPT | Performed by: INTERNAL MEDICINE

## 2024-06-26 PROCEDURE — G8427 DOCREV CUR MEDS BY ELIG CLIN: HCPCS | Performed by: INTERNAL MEDICINE

## 2024-06-26 PROCEDURE — 1036F TOBACCO NON-USER: CPT | Performed by: INTERNAL MEDICINE

## 2024-06-26 PROCEDURE — 99214 OFFICE O/P EST MOD 30 MIN: CPT | Performed by: INTERNAL MEDICINE

## 2024-06-26 PROCEDURE — 3017F COLORECTAL CA SCREEN DOC REV: CPT | Performed by: INTERNAL MEDICINE

## 2024-06-26 PROCEDURE — G8417 CALC BMI ABV UP PARAM F/U: HCPCS | Performed by: INTERNAL MEDICINE

## 2024-06-26 RX ORDER — ATORVASTATIN CALCIUM 40 MG/1
40 TABLET, FILM COATED ORAL DAILY
Qty: 90 TABLET | Refills: 3 | Status: SHIPPED | OUTPATIENT
Start: 2024-06-26

## 2024-06-26 RX ORDER — METOPROLOL SUCCINATE 25 MG/1
25 TABLET, EXTENDED RELEASE ORAL DAILY
Qty: 90 TABLET | Refills: 3 | Status: SHIPPED | OUTPATIENT
Start: 2024-06-26

## 2024-06-26 RX ORDER — SPIRONOLACTONE 25 MG/1
12.5 TABLET ORAL DAILY
Qty: 30 TABLET | Refills: 5 | Status: SHIPPED | OUTPATIENT
Start: 2024-06-26

## 2024-06-26 NOTE — PATIENT INSTRUCTIONS
Overall doing well  Bp controlled  Continue entresto/aldactone  Follow up with Dr. Walter as planned  Follow up in 6 months

## 2024-06-28 NOTE — PROGRESS NOTES
CMP reviewed  Mildly elevated K level could be secondary to dehydration.  Will order repeat BMP  
     Palpations: Abdomen is soft.   Musculoskeletal:         General: Normal range of motion.      Cervical back: Normal range of motion.   Skin:     General: Skin is warm.      Capillary Refill: Capillary refill takes less than 2 seconds.   Neurological:      General: No focal deficit present.      Mental Status: He is alert.   Psychiatric:         Mood and Affect: Mood normal.         Behavior: Behavior normal.         Thought Content: Thought content normal.         Judgment: Judgment normal.           Patient's complete Health Risk Assessment and screening values have been reviewed and are found in Flowsheets. The following problems were reviewed today and where indicated follow up appointments were made and/or referrals ordered.    Positive Risk Factor Screenings with Interventions:   Fall Risk:  Do you feel unsteady or are you worried about falling? : (!) yes  2 or more falls in past year?: no  Fall with injury in past year?: no     Interventions:    Reviewed medications, home hazards, visual acuity, and co-morbidities that can increase risk for falls             Personalized Preventive Plan   Current Health Maintenance Status  Immunization History   Administered Date(s) Administered    COVID-19, MODERNA BLUE border, Primary or Immunocompromised, (age 12y+), IM, 100 mcg/0.5mL 02/13/2021, 03/13/2021, 11/11/2021    Influenza A ([id number]),all Formulations 02/02/2010    Influenza Virus Vaccine 10/09/2002, 10/20/2009, 10/16/2010, 10/25/2011, 10/01/2012, 09/19/2013, 10/02/2013, 09/27/2014, 10/03/2015, 09/24/2016, 09/01/2017, 10/11/2017, 10/02/2018, 09/28/2019, 09/23/2021    Influenza, FLUAD, (age 65 y+), Adjuvanted, 0.5mL 09/23/2022    Influenza, FLUZONE (age 65 y+), High Dose, 0.7mL 10/09/2020, 09/12/2023    Influenza, High Dose (Fluzone 65 yrs and older) 10/02/2017, 09/28/2019, 10/09/2020    Influenza, Triv, inactivated, subunit, adjuvanted, IM (Fluad 65 yrs and older) 10/08/2018    Pneumococcal Vaccine 09/14/2009

## 2024-07-02 ENCOUNTER — NURSE ONLY (OUTPATIENT)
Dept: PRIMARY CARE CLINIC | Age: 74
End: 2024-07-02

## 2024-07-02 DIAGNOSIS — Z01.89 ROUTINE LAB DRAW: Primary | ICD-10-CM

## 2024-07-02 DIAGNOSIS — E11.9 TYPE 2 DIABETES MELLITUS WITHOUT COMPLICATION, WITHOUT LONG-TERM CURRENT USE OF INSULIN (HCC): ICD-10-CM

## 2024-07-02 LAB
REASON FOR REJECTION: NORMAL
REJECTED TEST: NORMAL

## 2024-07-03 DIAGNOSIS — E87.5 SERUM POTASSIUM ELEVATED: Primary | ICD-10-CM

## 2024-07-03 RX ORDER — METFORMIN HCL 500 MG
500 TABLET, EXTENDED RELEASE 24 HR ORAL NIGHTLY
Qty: 90 TABLET | Refills: 1 | OUTPATIENT
Start: 2024-07-03

## 2024-07-03 NOTE — TELEPHONE ENCOUNTER
Medication:   Requested Prescriptions     Pending Prescriptions Disp Refills    metFORMIN (GLUCOPHAGE-XR) 500 MG extended release tablet 90 tablet 1     Sig: Take 1 tablet by mouth nightly       Last Filled:      Patient Phone Number: [phone] (home)     Last appt: 6/24/2024   Next appt: 12/23/2024    Last Labs DM:   Lab Results   Component Value Date/Time    LABA1C 5.9 06/24/2024 10:40 AM

## 2024-07-05 DIAGNOSIS — E87.5 SERUM POTASSIUM ELEVATED: ICD-10-CM

## 2024-07-05 LAB
ANION GAP SERPL CALCULATED.3IONS-SCNC: 8 MMOL/L (ref 3–16)
BUN SERPL-MCNC: 15 MG/DL (ref 7–20)
CALCIUM SERPL-MCNC: 9.7 MG/DL (ref 8.3–10.6)
CHLORIDE SERPL-SCNC: 108 MMOL/L (ref 99–110)
CO2 SERPL-SCNC: 29 MMOL/L (ref 21–32)
CREAT SERPL-MCNC: 0.9 MG/DL (ref 0.8–1.3)
GFR SERPLBLD CREATININE-BSD FMLA CKD-EPI: 89 ML/MIN/{1.73_M2}
GLUCOSE SERPL-MCNC: 116 MG/DL (ref 70–99)
POTASSIUM SERPL-SCNC: 4.9 MMOL/L (ref 3.5–5.1)
SODIUM SERPL-SCNC: 145 MMOL/L (ref 136–145)

## 2024-08-04 NOTE — TELEPHONE ENCOUNTER
FYI- PA has been started
[employer] Prior Auth   Freestyle Freedom Lite meter    Prior Auth received from pharmacy, scanned to media, routing to clinical staff for review prior to sending to pa dept.
[facility] - gunner

## 2024-08-15 DIAGNOSIS — E11.9 TYPE 2 DIABETES MELLITUS WITHOUT COMPLICATION, WITHOUT LONG-TERM CURRENT USE OF INSULIN (HCC): ICD-10-CM

## 2024-08-16 RX ORDER — METFORMIN HYDROCHLORIDE 500 MG/1
500 TABLET, EXTENDED RELEASE ORAL NIGHTLY
Qty: 90 TABLET | Refills: 1 | Status: SHIPPED | OUTPATIENT
Start: 2024-08-16

## 2024-08-16 NOTE — TELEPHONE ENCOUNTER
Medication:   Requested Prescriptions     Pending Prescriptions Disp Refills    metFORMIN (GLUCOPHAGE-XR) 500 MG extended release tablet 90 tablet 1     Sig: Take 1 tablet by mouth nightly       Last Filled:  [id number] #90 x 1    Patient Phone Number: [phone] (home)     Last appt: 6/24/2024   Next appt: 12/23/2024-  Return in about 6 months (around 12/24/2024    Last Labs DM:   Lab Results   Component Value Date/Time    LABA1C 5.9 06/24/2024 10:40 AM

## 2024-08-16 NOTE — TELEPHONE ENCOUNTER
Requested Prescriptions     Pending Prescriptions Disp Refills    atorvastatin (LIPITOR) 40 MG tablet 90 tablet 3     Sig: Take 1 tablet by mouth daily            Checked Correct Pharmacy: Yes    Any changes since last refill? No       Last Office Visit: 6/26/2024     Next Office Visit: 10/1/2024

## 2024-08-19 RX ORDER — ATORVASTATIN CALCIUM 40 MG/1
40 TABLET, FILM COATED ORAL DAILY
Qty: 90 TABLET | Refills: 0 | Status: SHIPPED | OUTPATIENT
Start: 2024-08-19

## 2024-08-27 ENCOUNTER — TELEPHONE (OUTPATIENT)
Dept: PRIMARY CARE CLINIC | Age: 74
End: 2024-08-27

## 2024-08-27 DIAGNOSIS — E11.9 TYPE 2 DIABETES MELLITUS WITHOUT COMPLICATION, WITHOUT LONG-TERM CURRENT USE OF INSULIN (HCC): ICD-10-CM

## 2024-08-27 NOTE — TELEPHONE ENCOUNTER
Medication:   Requested Prescriptions     Pending Prescriptions Disp Refills    metFORMIN (GLUCOPHAGE-XR) 500 MG extended release tablet 90 tablet 1     Sig: Take 1 tablet by mouth nightly       Last Filled: [id number] #90 x 1 [facility] refuses to transfer RX Order to new rX     Patient Phone Number: [phone] (home)     Last appt: 6/24/2024   Next appt: 12/23/2024    Last Labs DM:   Lab Results   Component Value Date/Time    LABA1C 5.9 06/24/2024 10:40 AM

## 2024-08-27 NOTE — TELEPHONE ENCOUNTER
[facility] calling stating that they have made several attempts to [facility] to have an Rx transferred.  Krista would like a new Rx for Metformin 500MG.

## 2024-08-28 RX ORDER — METFORMIN HCL 500 MG
500 TABLET, EXTENDED RELEASE 24 HR ORAL NIGHTLY
Qty: 90 TABLET | Refills: 1 | Status: SHIPPED | OUTPATIENT
Start: 2024-08-28

## 2024-09-27 DIAGNOSIS — K21.9 GASTROESOPHAGEAL REFLUX DISEASE, UNSPECIFIED WHETHER ESOPHAGITIS PRESENT: ICD-10-CM

## 2024-09-30 NOTE — PROGRESS NOTES
[facility]     Outpatient Cardiology         Patient Name:  Corby Benton  Requesting Physician: No admitting provider for patient encounter.  Primary Care Physician: Melisa Barger APRN - CNP    Reason for Consultation/Chief Complaint:   Chief Complaint   Patient presents with    Cardiomyopathy         History of Present Illness:    HPI     Corby Barbour a 74 y.o. male with PMH of cardiomyopathy, HLD, GERD, DM II, OA, BPH, left lung nodule.   Here to establish care for cardiomyopathy and HLD.   Cardiomyopathy, EF 35-40% on echo (4/16/24), entresto 24-26 mg BID, aldactone 12.5 mg daily, normal cuff, today we discussed adding Jardiance.  Will check EF in 3 months with a cardiac monitor  HLD, Last LDL 48 (10/6/23), on lipitor 40 mg daily   Dilated ascending aorta, 3.9 cm on echo, reassess with cardiac MRI  Feeling well from a cardiac perspective      PMH  Past Medical History:   Diagnosis Date    Anemia     BPH (benign prostatic hyperplasia)     Cancer (HCC)     MELANOMA    CHF (congestive heart failure) (HCC)     pt denies    Diabetes mellitus (HCC)     GERD (gastroesophageal reflux disease)     Gout     Hyperlipidemia     Macular degeneration     Osteoarthritis     Wears dentures     Wears glasses        PSH  Past Surgical History:   Procedure Laterality Date    EYE SURGERY      cataracts w/ lens    EYE SURGERY Left     injection for macular degeneration - monthly    FINGER NAIL SURGERY Left 11/22/2022    LEFT SMALL FINGER NAIL PLATE REMOVAL performed by Inocencio Mart MD at [facility] OR    HEMORRHOID SURGERY      SKIN CANCER EXCISION      TURP N/A 11/28/2023    CYSTOSCOPY, BIPOLAR TRANSURETHRAL RESECTION OF PROSTATE performed by Romeo Love MD at [facility] OR        Social HIstory  Social History     Tobacco Use    Smoking status: Former     Current packs/day: 0.00     Average packs/day: 1 pack/day for 32.0 years (32.0 ttl pk-yrs)     Types: Cigarettes     Start date: 1/24/1968

## 2024-10-01 ENCOUNTER — OFFICE VISIT (OUTPATIENT)
Dept: CARDIOLOGY CLINIC | Age: 74
End: 2024-10-01
Payer: MEDICARE

## 2024-10-01 VITALS
BODY MASS INDEX: 27.7 KG/M2 | SYSTOLIC BLOOD PRESSURE: 104 MMHG | HEART RATE: 86 BPM | WEIGHT: 221.6 LBS | DIASTOLIC BLOOD PRESSURE: 70 MMHG | OXYGEN SATURATION: 95 %

## 2024-10-01 DIAGNOSIS — I77.819 AORTIC DILATATION (HCC): ICD-10-CM

## 2024-10-01 DIAGNOSIS — E78.2 MIXED HYPERLIPIDEMIA: ICD-10-CM

## 2024-10-01 DIAGNOSIS — I42.9 CARDIOMYOPATHY, UNSPECIFIED TYPE (HCC): Primary | ICD-10-CM

## 2024-10-01 PROCEDURE — 1036F TOBACCO NON-USER: CPT | Performed by: INTERNAL MEDICINE

## 2024-10-01 PROCEDURE — 1123F ACP DISCUSS/DSCN MKR DOCD: CPT | Performed by: INTERNAL MEDICINE

## 2024-10-01 PROCEDURE — G8484 FLU IMMUNIZE NO ADMIN: HCPCS | Performed by: INTERNAL MEDICINE

## 2024-10-01 PROCEDURE — G8427 DOCREV CUR MEDS BY ELIG CLIN: HCPCS | Performed by: INTERNAL MEDICINE

## 2024-10-01 PROCEDURE — 99214 OFFICE O/P EST MOD 30 MIN: CPT | Performed by: INTERNAL MEDICINE

## 2024-10-01 PROCEDURE — 3017F COLORECTAL CA SCREEN DOC REV: CPT | Performed by: INTERNAL MEDICINE

## 2024-10-01 PROCEDURE — G8417 CALC BMI ABV UP PARAM F/U: HCPCS | Performed by: INTERNAL MEDICINE

## 2024-10-01 RX ORDER — FOLIC ACID 1 MG/1
TABLET ORAL
COMMUNITY
Start: 2024-07-19

## 2024-10-01 RX ORDER — SACUBITRIL AND VALSARTAN 24; 26 MG/1; MG/1
1 TABLET, FILM COATED ORAL 2 TIMES DAILY
Qty: 60 TABLET | Refills: 5 | Status: SHIPPED | OUTPATIENT
Start: 2024-10-01

## 2024-10-01 ASSESSMENT — ENCOUNTER SYMPTOMS
VOMITING: 0
FACIAL SWELLING: 0
BLOOD IN STOOL: 0
ABDOMINAL PAIN: 0
BACK PAIN: 0
EYE DISCHARGE: 0
COUGH: 0
ABDOMINAL DISTENTION: 0
CHEST TIGHTNESS: 0
SHORTNESS OF BREATH: 0
WHEEZING: 0
COLOR CHANGE: 0

## 2024-10-01 NOTE — PATIENT INSTRUCTIONS
Please call [phone] to schedule cardiac MRI.       [facility] address  [address] [place] [address]  [address]

## 2024-10-28 ENCOUNTER — HOSPITAL ENCOUNTER (OUTPATIENT)
Age: 74
Discharge: HOME | End: 2024-10-28
Attending: INTERNAL MEDICINE
Payer: MEDICARE

## 2024-10-28 DIAGNOSIS — R91.1 NODULE OF UPPER LOBE OF LEFT LUNG: ICD-10-CM

## 2024-10-28 PROCEDURE — 71250 CT THORAX DX C-: CPT

## 2024-10-31 NOTE — RESULT ENCOUNTER NOTE
Can someone let Corby know that I looked at his CT.  His scan at [place] got put into a different account than the scan from [facility] in June.  The nodule is still there and the same size.  Meaning that it's stable.  We need to follow it, but I do not think it warrants a biopsy at this time, despite the radiology read.  Patient is going to see me in a little over 2 weeks, so he and I can discuss at that time.

## 2024-11-09 DIAGNOSIS — M10.00 IDIOPATHIC GOUT, UNSPECIFIED CHRONICITY, UNSPECIFIED SITE: ICD-10-CM

## 2024-11-11 RX ORDER — ALLOPURINOL 100 MG/1
100 TABLET ORAL DAILY
Qty: 90 TABLET | Refills: 3 | OUTPATIENT
Start: 2024-11-11

## 2024-11-11 RX ORDER — ALLOPURINOL 300 MG/1
300 TABLET ORAL DAILY
Qty: 90 TABLET | Refills: 3 | OUTPATIENT
Start: 2024-11-11

## 2024-11-18 ENCOUNTER — OFFICE VISIT (OUTPATIENT)
Age: 74
End: 2024-11-18
Payer: MEDICARE

## 2024-11-18 VITALS
HEART RATE: 76 BPM | SYSTOLIC BLOOD PRESSURE: 124 MMHG | DIASTOLIC BLOOD PRESSURE: 74 MMHG | BODY MASS INDEX: 27.48 KG/M2 | OXYGEN SATURATION: 95 % | HEIGHT: 75 IN | WEIGHT: 221 LBS

## 2024-11-18 DIAGNOSIS — R91.1 PULMONARY NODULE: ICD-10-CM

## 2024-11-18 DIAGNOSIS — R91.1 NODULE OF UPPER LOBE OF LEFT LUNG: Primary | ICD-10-CM

## 2024-11-18 PROCEDURE — G8427 DOCREV CUR MEDS BY ELIG CLIN: HCPCS | Performed by: INTERNAL MEDICINE

## 2024-11-18 PROCEDURE — 99213 OFFICE O/P EST LOW 20 MIN: CPT | Performed by: INTERNAL MEDICINE

## 2024-11-18 PROCEDURE — 3017F COLORECTAL CA SCREEN DOC REV: CPT | Performed by: INTERNAL MEDICINE

## 2024-11-18 PROCEDURE — 1036F TOBACCO NON-USER: CPT | Performed by: INTERNAL MEDICINE

## 2024-11-18 PROCEDURE — 1123F ACP DISCUSS/DSCN MKR DOCD: CPT | Performed by: INTERNAL MEDICINE

## 2024-11-18 PROCEDURE — 1159F MED LIST DOCD IN RCRD: CPT | Performed by: INTERNAL MEDICINE

## 2024-11-18 PROCEDURE — G8484 FLU IMMUNIZE NO ADMIN: HCPCS | Performed by: INTERNAL MEDICINE

## 2024-11-18 PROCEDURE — G8417 CALC BMI ABV UP PARAM F/U: HCPCS | Performed by: INTERNAL MEDICINE

## 2024-11-18 NOTE — PROGRESS NOTES
palpitations, edema  GASTROINTESTINAL: Negative for nausea, vomiting, diarrhea, constipation and abdominal pain  HEMATOLOGICAL: Negative for adenopathy  SKIN: Negative for clubbing, cyanosis, skin lesions  EXTREMITIES: Negative for weakness, decreased ROM  NEUROLOGICAL: Negative for unilateral weakness, speech or gait abnormalities  PSYCH: Negative for anxiety, depression    Objective:   PHYSICAL EXAM:        VITALS:  /74 (Site: Left Upper Arm, Position: Sitting, Cuff Size: Medium Adult)   Pulse 76   Ht 1.905 m (6' 3\")   Wt 100.2 kg (221 lb)   SpO2 95%   BMI 27.62 kg/m²     CONSTITUTIONAL:  Awake, alert, cooperative, no apparent distress, and appears stated age  HEENT: No oropharyngeal exudate, PERRL, no cervical adenopathy, no tracheal deviation, thyroid size normal  LUNGS:  No increased work of breathing and clear to auscultation, no crackles or wheezing   CARDIOVASCULAR:  normal S1 and S2 and no JVD  ABDOMEN:  Normal bowel sounds, non-distended and non-tender to palpation  EXT: No edema, no calf tenderness. Pulses are present bilaterally.  NEUROLOGIC:  Mental Status Exam:  Level of Alertness:   awake  Orientation:   person, place, time.  SKIN:  normal skin color, texture, turgor, no redness, warmth, or swelling     DATA:      Radiology Review:  Pertinent images / reports were reviewed as a part of this visit.     [facility] 4/2021      [facility]    10/2024    Last PFTs: None on file    Immunizations:   Immunization History   Administered Date(s) Administered    COVID-19, MODERNA BLUE border, Primary or Immunocompromised, (age 12y+), IM, 100 mcg/0.5mL 02/13/2021, 03/13/2021, 11/11/2021    Influenza A (I2t3-61),all Formulations 02/02/2010    Influenza Virus Vaccine 10/09/2002, 10/20/2009, 10/16/2010, 10/25/2011, 10/01/2012, 09/19/2013, 10/02/2013, 09/27/2014, 10/03/2015, 09/24/2016, 09/01/2017, 10/11/2017, 10/02/2018, 09/28/2019, 09/23/2021    Influenza, FLUAD, (age 65 y+), IM, Quadv, 0.5mL 09/23/2022

## 2024-12-23 ENCOUNTER — OFFICE VISIT (OUTPATIENT)
Dept: PRIMARY CARE CLINIC | Age: 74
End: 2024-12-23

## 2024-12-23 VITALS
TEMPERATURE: 97.5 F | OXYGEN SATURATION: 96 % | DIASTOLIC BLOOD PRESSURE: 68 MMHG | RESPIRATION RATE: 18 BRPM | SYSTOLIC BLOOD PRESSURE: 108 MMHG | WEIGHT: 218 LBS | HEIGHT: 75 IN | BODY MASS INDEX: 27.1 KG/M2 | HEART RATE: 101 BPM

## 2024-12-23 DIAGNOSIS — M10.00 IDIOPATHIC GOUT, UNSPECIFIED CHRONICITY, UNSPECIFIED SITE: ICD-10-CM

## 2024-12-23 DIAGNOSIS — K21.9 GASTROESOPHAGEAL REFLUX DISEASE, UNSPECIFIED WHETHER ESOPHAGITIS PRESENT: ICD-10-CM

## 2024-12-23 DIAGNOSIS — E11.9 TYPE 2 DIABETES MELLITUS WITHOUT COMPLICATION, WITHOUT LONG-TERM CURRENT USE OF INSULIN (HCC): Primary | ICD-10-CM

## 2024-12-23 DIAGNOSIS — M05.79 SEROPOSITIVE RHEUMATOID ARTHRITIS OF MULTIPLE JOINTS (HCC): ICD-10-CM

## 2024-12-23 LAB — HBA1C MFR BLD: 5.9 %

## 2024-12-23 RX ORDER — METFORMIN HYDROCHLORIDE 500 MG/1
500 TABLET, EXTENDED RELEASE ORAL NIGHTLY
Qty: 90 TABLET | Refills: 1 | Status: SHIPPED | OUTPATIENT
Start: 2024-12-23

## 2024-12-23 RX ORDER — ALLOPURINOL 100 MG/1
100 TABLET ORAL DAILY
Qty: 90 TABLET | Refills: 3 | Status: SHIPPED | OUTPATIENT
Start: 2024-12-23

## 2024-12-23 RX ORDER — ALLOPURINOL 300 MG/1
300 TABLET ORAL DAILY
Qty: 90 TABLET | Refills: 3 | Status: SHIPPED | OUTPATIENT
Start: 2024-12-23

## 2024-12-23 ASSESSMENT — ENCOUNTER SYMPTOMS
ABDOMINAL PAIN: 1
VOMITING: 0
SORE THROAT: 1
COUGH: 0
DIARRHEA: 1
NAUSEA: 0
WHEEZING: 0
SHORTNESS OF BREATH: 0
RECTAL PAIN: 0

## 2024-12-27 DIAGNOSIS — E11.9 TYPE 2 DIABETES MELLITUS WITHOUT COMPLICATION, WITHOUT LONG-TERM CURRENT USE OF INSULIN (HCC): ICD-10-CM

## 2024-12-27 DIAGNOSIS — M10.00 IDIOPATHIC GOUT, UNSPECIFIED CHRONICITY, UNSPECIFIED SITE: ICD-10-CM

## 2024-12-28 LAB
ALBUMIN SERPL-MCNC: 4 G/DL (ref 3.4–5)
ALBUMIN/GLOB SERPL: 1.7 {RATIO} (ref 1.1–2.2)
ALP SERPL-CCNC: 118 U/L (ref 40–129)
ALT SERPL-CCNC: 36 U/L (ref 10–40)
ANION GAP SERPL CALCULATED.3IONS-SCNC: 11 MMOL/L (ref 3–16)
AST SERPL-CCNC: 27 U/L (ref 15–37)
BILIRUB SERPL-MCNC: 0.7 MG/DL (ref 0–1)
BUN SERPL-MCNC: 18 MG/DL (ref 7–20)
CALCIUM SERPL-MCNC: 9.2 MG/DL (ref 8.3–10.6)
CHLORIDE SERPL-SCNC: 104 MMOL/L (ref 99–110)
CHOLEST SERPL-MCNC: 100 MG/DL (ref 0–199)
CO2 SERPL-SCNC: 26 MMOL/L (ref 21–32)
CREAT SERPL-MCNC: 1.1 MG/DL (ref 0.8–1.3)
CREAT UR-MCNC: 144 MG/DL (ref 39–259)
GFR SERPLBLD CREATININE-BSD FMLA CKD-EPI: 70 ML/MIN/{1.73_M2}
GLUCOSE SERPL-MCNC: 91 MG/DL (ref 70–99)
HDLC SERPL-MCNC: 36 MG/DL (ref 40–60)
LDLC SERPL CALC-MCNC: 49 MG/DL
MICROALBUMIN UR DL<=1MG/L-MCNC: <1.2 MG/DL
MICROALBUMIN/CREAT UR: NORMAL MG/G (ref 0–30)
POTASSIUM SERPL-SCNC: 4.9 MMOL/L (ref 3.5–5.1)
PROT SERPL-MCNC: 6.3 G/DL (ref 6.4–8.2)
SODIUM SERPL-SCNC: 141 MMOL/L (ref 136–145)
TRIGL SERPL-MCNC: 77 MG/DL (ref 0–150)
URATE SERPL-MCNC: 3.3 MG/DL (ref 3.5–7.2)
VLDLC SERPL CALC-MCNC: 15 MG/DL

## 2025-01-07 RX ORDER — SACUBITRIL AND VALSARTAN 24; 26 MG/1; MG/1
1 TABLET, FILM COATED ORAL 2 TIMES DAILY
Qty: 60 TABLET | Refills: 3 | Status: SHIPPED | OUTPATIENT
Start: 2025-01-07 | End: 2025-01-10

## 2025-01-08 DIAGNOSIS — M10.00 IDIOPATHIC GOUT, UNSPECIFIED CHRONICITY, UNSPECIFIED SITE: Primary | ICD-10-CM

## 2025-01-08 NOTE — TELEPHONE ENCOUNTER
Requested Prescriptions      No prescriptions requested or ordered in this encounter            Checked Correct Pharmacy: Yes    Any changes since last refill? No       Last OV: Visit date 10/21/24FRL    Next OV:2/11/25FRL    Last Labs:   BMP:   Lab Results   Component Value Date/Time     12/27/2024 12:11 PM    K 4.9 12/27/2024 12:11 PM     12/27/2024 12:11 PM    CO2 26 12/27/2024 12:11 PM    BUN 18 12/27/2024 12:11 PM    CREATININE 1.1 12/27/2024 12:11 PM    GLUCOSE 91 12/27/2024 12:11 PM    CALCIUM 9.2 12/27/2024 12:11 PM    LABGLOM 70 12/27/2024 12:11 PM    LABGLOM >60 11/30/2023 11:24 AM

## 2025-01-10 RX ORDER — SACUBITRIL AND VALSARTAN 24; 26 MG/1; MG/1
1 TABLET, FILM COATED ORAL 2 TIMES DAILY
Qty: 180 TABLET | Refills: 3 | Status: SHIPPED | OUTPATIENT
Start: 2025-01-10

## 2025-01-10 NOTE — TELEPHONE ENCOUNTER
Requested Prescriptions     Pending Prescriptions Disp Refills    sacubitril-valsartan (ENTRESTO) 24-26 MG per tablet 180 tablet 3     Sig: Take 1 tablet by mouth 2 times daily    empagliflozin (JARDIANCE) 10 MG tablet 90 tablet 3     Sig: Take 1 tablet by mouth daily            Checked Correct Pharmacy: Yes- Pharmacy Mail Delivery requires larger quantity to fill prescription.     Any changes since last refill? No     Number: 180-(ENTRESTO) 90-(JARDIANCE)  Refills: 3-Both Rx.    Last OV: 10/01/2024 with Provider: RUCHI    Next OV: 02/11/2025 Provider: RUCHI    Last Labs:   CMP:   Lab Results   Component Value Date     12/27/2024    K 4.9 12/27/2024     12/27/2024    CO2 26 12/27/2024    BUN 18 12/27/2024    CREATININE 1.1 12/27/2024    GLUCOSE 91 12/27/2024    CALCIUM 9.2 12/27/2024    BILITOT 0.7 12/27/2024    ALKPHOS 118 12/27/2024    AST 27 12/27/2024    ALT 36 12/27/2024    LABGLOM 70 12/27/2024    GFRAA >60 07/25/2022    AGRATIO 1.7 12/27/2024          Lipid:   Lab Results   Component Value Date    CHOL 100 12/27/2024    TRIG 77 12/27/2024    HDL 36 (L) 12/27/2024    LDL 49 12/27/2024    VLDL 15 12/27/2024

## 2025-01-23 RX ORDER — ATORVASTATIN CALCIUM 40 MG/1
40 TABLET, FILM COATED ORAL DAILY
Qty: 90 TABLET | Refills: 3 | Status: SHIPPED | OUTPATIENT
Start: 2025-01-23

## 2025-01-23 NOTE — TELEPHONE ENCOUNTER
Medication:   Requested Prescriptions     Pending Prescriptions Disp Refills    atorvastatin (LIPITOR) 40 MG tablet [Pharmacy Med Name: Atorvastatin Calcium Oral Tablet 40 MG] 90 tablet 3     Sig: TAKE 1 TABLET EVERY DAY        Last Filled:  8/19/2024 #90, 0 ref    Patient Phone Number: [phone] (home)     Last appt: 12/23/2024   Next appt: 6/23/2025    Last OARRS:        No data to display

## 2025-02-03 ENCOUNTER — HOSPITAL ENCOUNTER (OUTPATIENT)
Dept: MRI IMAGING | Age: 75
Discharge: HOME | End: 2025-02-03
Attending: INTERNAL MEDICINE
Payer: MEDICARE

## 2025-02-03 DIAGNOSIS — I42.9 CARDIOMYOPATHY, UNSPECIFIED TYPE (HCC): ICD-10-CM

## 2025-02-03 PROCEDURE — 75565 CARD MRI VELOC FLOW MAPPING: CPT

## 2025-02-03 PROCEDURE — 6360000004 HC RX CONTRAST MEDICATION: Performed by: INTERNAL MEDICINE

## 2025-02-03 PROCEDURE — 75561 CARDIAC MRI FOR MORPH W/DYE: CPT | Performed by: INTERNAL MEDICINE

## 2025-02-03 PROCEDURE — 75565 CARD MRI VELOC FLOW MAPPING: CPT | Performed by: INTERNAL MEDICINE

## 2025-02-03 PROCEDURE — A9585 GADOBUTROL INJECTION: HCPCS | Performed by: INTERNAL MEDICINE

## 2025-02-03 RX ORDER — GADOBUTROL 604.72 MG/ML
18 INJECTION INTRAVENOUS
Status: COMPLETED | OUTPATIENT
Start: 2025-02-03 | End: 2025-02-03

## 2025-02-03 RX ADMIN — GADOBUTROL 18 ML: 604.72 INJECTION INTRAVENOUS at 13:57

## 2025-02-11 ENCOUNTER — OFFICE VISIT (OUTPATIENT)
Dept: CARDIOLOGY CLINIC | Age: 75
End: 2025-02-11
Payer: MEDICARE

## 2025-02-11 VITALS
HEART RATE: 84 BPM | BODY MASS INDEX: 27.67 KG/M2 | WEIGHT: 221.4 LBS | DIASTOLIC BLOOD PRESSURE: 50 MMHG | SYSTOLIC BLOOD PRESSURE: 98 MMHG

## 2025-02-11 DIAGNOSIS — E78.2 MIXED HYPERLIPIDEMIA: ICD-10-CM

## 2025-02-11 DIAGNOSIS — I42.9 CARDIOMYOPATHY, UNSPECIFIED TYPE (HCC): Primary | ICD-10-CM

## 2025-02-11 DIAGNOSIS — I77.819 AORTIC DILATATION (HCC): ICD-10-CM

## 2025-02-11 PROCEDURE — 3017F COLORECTAL CA SCREEN DOC REV: CPT | Performed by: INTERNAL MEDICINE

## 2025-02-11 PROCEDURE — 1123F ACP DISCUSS/DSCN MKR DOCD: CPT | Performed by: INTERNAL MEDICINE

## 2025-02-11 PROCEDURE — 1159F MED LIST DOCD IN RCRD: CPT | Performed by: INTERNAL MEDICINE

## 2025-02-11 PROCEDURE — 99214 OFFICE O/P EST MOD 30 MIN: CPT | Performed by: INTERNAL MEDICINE

## 2025-02-11 PROCEDURE — G8427 DOCREV CUR MEDS BY ELIG CLIN: HCPCS | Performed by: INTERNAL MEDICINE

## 2025-02-11 PROCEDURE — G8417 CALC BMI ABV UP PARAM F/U: HCPCS | Performed by: INTERNAL MEDICINE

## 2025-02-11 PROCEDURE — 1036F TOBACCO NON-USER: CPT | Performed by: INTERNAL MEDICINE

## 2025-02-11 RX ORDER — SACUBITRIL AND VALSARTAN 24; 26 MG/1; MG/1
1 TABLET, FILM COATED ORAL 2 TIMES DAILY
Qty: 180 TABLET | Refills: 3 | Status: SHIPPED | OUTPATIENT
Start: 2025-02-11

## 2025-02-11 RX ORDER — CETIRIZINE HYDROCHLORIDE 10 MG/1
1 TABLET ORAL DAILY
COMMUNITY
Start: 2025-02-07

## 2025-02-11 RX ORDER — FLUOROURACIL 50 MG/G
CREAM TOPICAL
COMMUNITY
Start: 2025-01-03

## 2025-02-11 ASSESSMENT — ENCOUNTER SYMPTOMS
SHORTNESS OF BREATH: 0
BLOOD IN STOOL: 0
FACIAL SWELLING: 0
COLOR CHANGE: 0
BACK PAIN: 0
WHEEZING: 0
ABDOMINAL DISTENTION: 0
VOMITING: 0
ABDOMINAL PAIN: 0
EYE DISCHARGE: 0
CHEST TIGHTNESS: 0
COUGH: 0

## 2025-02-11 NOTE — PROGRESS NOTES
[facility]     Outpatient Cardiology         Patient Name:  Corby Benton  Requesting Physician: No admitting provider for patient encounter.  Primary Care Physician: Melisa Barger APRN - CNP    Reason for Consultation/Chief Complaint:   Chief Complaint   Patient presents with    Cardiomyopathy         History of Present Illness:    HPI     Corby Barbour a 74 y.o. male with PMH of cardiomyopathy, HLD, GERD, DM II, OA, BPH, left lung nodule.   Here for follow up for cardiomyopathy and HLD.   Cardiomyopathy, EF 34% on CMR (2/3/25), entresto 24-26 mg BID, aldactone 12.5 mg daily, jardiance 10 mg daily, discussed possible ICD, will refer to EP.  Compensated otherwise  HLD, Last LDL 49 (12/27/24), on lipitor 40 mg daily no myalgias  Dilated ascending aorta, 3.9 cm on echo, asymptomatic, BP controlled, reassess next year      PMH  Past Medical History:   Diagnosis Date    Anemia     BPH (benign prostatic hyperplasia)     Cancer (HCC)     MELANOMA    CHF (congestive heart failure) (HCC)     pt denies    Diabetes mellitus (HCC)     GERD (gastroesophageal reflux disease)     Gout     Hyperlipidemia     Macular degeneration     Osteoarthritis     Wears dentures     Wears glasses        PSH  Past Surgical History:   Procedure Laterality Date    EYE SURGERY      cataracts w/ lens    EYE SURGERY Left     injection for macular degeneration - monthly    FINGER NAIL SURGERY Left 11/22/2022    LEFT SMALL FINGER NAIL PLATE REMOVAL performed by Inocencio Mart MD at [facility] OR    HEMORRHOID SURGERY      SKIN CANCER EXCISION      TURP N/A 11/28/2023    CYSTOSCOPY, BIPOLAR TRANSURETHRAL RESECTION OF PROSTATE performed by Romeo Love MD at [facility] OR        Social HIstory  Social History     Tobacco Use    Smoking status: Former     Current packs/day: 0.00     Average packs/day: 1 pack/day for 32.0 years (32.0 ttl pk-yrs)     Types: Cigarettes     Start date: 1/24/1968     Quit date: 1/24/2000

## 2025-02-11 NOTE — ASSESSMENT & PLAN NOTE
Elsa is requesting the MRI results for the patient. The MRI was on 7/6/18 and Elsa is wondering if the results are in. Please call to advise.    On Lipitor.  No side effects

## 2025-02-11 NOTE — ASSESSMENT & PLAN NOTE
None ischemic.  Cath in 2022  Compensated.  Currently on spironolactone, Toprol, Entresto, Jardiance.  Reassess LV function with MRI after 3 months of GDMT, EF 34.  Refer to EP for possible ICD

## 2025-02-14 ENCOUNTER — TELEMEDICINE (OUTPATIENT)
Age: 75
End: 2025-02-14
Payer: MEDICARE

## 2025-02-14 DIAGNOSIS — Z20.828 EXPOSURE TO INFLUENZA: Primary | ICD-10-CM

## 2025-02-14 DIAGNOSIS — R05.1 ACUTE COUGH: ICD-10-CM

## 2025-02-14 PROCEDURE — 1036F TOBACCO NON-USER: CPT | Performed by: NURSE PRACTITIONER

## 2025-02-14 PROCEDURE — 99213 OFFICE O/P EST LOW 20 MIN: CPT | Performed by: NURSE PRACTITIONER

## 2025-02-14 PROCEDURE — 1159F MED LIST DOCD IN RCRD: CPT | Performed by: NURSE PRACTITIONER

## 2025-02-14 PROCEDURE — G8427 DOCREV CUR MEDS BY ELIG CLIN: HCPCS | Performed by: NURSE PRACTITIONER

## 2025-02-14 PROCEDURE — G8417 CALC BMI ABV UP PARAM F/U: HCPCS | Performed by: NURSE PRACTITIONER

## 2025-02-14 PROCEDURE — 3017F COLORECTAL CA SCREEN DOC REV: CPT | Performed by: NURSE PRACTITIONER

## 2025-02-14 PROCEDURE — 1123F ACP DISCUSS/DSCN MKR DOCD: CPT | Performed by: NURSE PRACTITIONER

## 2025-02-14 RX ORDER — OSELTAMIVIR PHOSPHATE 75 MG/1
75 CAPSULE ORAL DAILY
Qty: 7 CAPSULE | Refills: 0 | Status: SHIPPED | OUTPATIENT
Start: 2025-02-14 | End: 2025-02-21

## 2025-02-14 RX ORDER — BENZONATATE 100 MG/1
100-200 CAPSULE ORAL 3 TIMES DAILY PRN
Qty: 40 CAPSULE | Refills: 0 | Status: SHIPPED | OUTPATIENT
Start: 2025-02-14 | End: 2025-02-21

## 2025-02-14 ASSESSMENT — ENCOUNTER SYMPTOMS
CHEST TIGHTNESS: 0
GASTROINTESTINAL NEGATIVE: 1
WHEEZING: 0
COUGH: 1
SHORTNESS OF BREATH: 0
RHINORRHEA: 1

## 2025-02-14 NOTE — PATIENT INSTRUCTIONS
Notify office if you do not improve or have worsening of condition.  Take medication as prescribed.   Most consistent with viral illness and antibiotics will not help at this time, however, continue to monitor if no improvement or worsening could develop in to bacterial infection and can be treated with antibiotics.  Tamiflu as prescribed due to exposure to flu.  Coricidin HBP (over the counter at pharmacy) for cold congestion flu  Drink plenty of fluids and rest.  Practice good hand hygiene.  May use Cepacol lozenges, or chloraseptic spray.  May sleep with humidifier as needed.  Gargle with warm salt water 3-4 times a day to help with sore throat. Warm tea with honey.  You can use ice, warm chicken noodle soup, soft foods, popsicles and cough drops to help soothe your throat.  May take Tylenol as needed for fever/pain.  Do not eat or drink after anyone.   Do not share utensils.  Cover your mouth and nose when you cough or sneeze.  Follow up with PCP in 3-4 days if not improving or sooner if worsening.  Please refer to educational handout with AVS.

## 2025-02-14 NOTE — PROGRESS NOTES
no improvement or worsening of condition.   See above plan.  Take medications as prescribed.  Please review educational handouts with AVS.    - benzonatate (TESSALON) 100 MG capsule; Take 1-2 capsules by mouth 3 times daily as needed for Cough  Dispense: 40 capsule; Refill: 0      The patient would benefit from future follow up with their usual PCP. As of the end of their Virtualist Visit today, follow up visit status is as follows: Patient to follow up as previously instructed by PCP      Total time spent on this encounter:  20 minutes    Return follow up with PCP in 3-4 days if symptoms worsen or fail to improve, for Influenza, Cough, URI.    Corby Benton, was evaluated through a synchronous (real-time) audio-video encounter. The patient (or guardian if applicable) is aware that this is a billable service, which includes applicable co-pays. This Virtual Visit was conducted with patient's (and/or legal guardian's) consent. Patient identification was verified, and a caregiver was present when appropriate.   The patient was located at Home: [place] [address]  Provider was located at Other: HOME:[place]  Confirm you are appropriately licensed, registered, or certified to deliver care in the state where the patient is located as indicated above. If you are not or unsure, please re-schedule the visit: Yes, I confirm.        --JOSE Alvarez CNP on 2/14/2025 at 10:45 AM    An electronic signature was used to authenticate this note.

## 2025-02-18 RX ORDER — EMPAGLIFLOZIN 10 MG/1
10 TABLET, FILM COATED ORAL DAILY
Qty: 90 TABLET | Refills: 3 | Status: SHIPPED | OUTPATIENT
Start: 2025-02-18

## 2025-02-18 RX ORDER — SACUBITRIL AND VALSARTAN 24; 26 MG/1; MG/1
1 TABLET, FILM COATED ORAL 2 TIMES DAILY
Qty: 180 TABLET | Refills: 3 | Status: SHIPPED | OUTPATIENT
Start: 2025-02-18

## 2025-03-10 NOTE — PROGRESS NOTES
pressure is at 23 mmHg assuming a right   atrial pressure of 3 mmHg.    Echo 5/31/23: EF 45-50%    2. Cardiac MRI 2/3/25:    Findings are suggestive of a non ischemic/non infiltrative cardiomyopathy with severe LV systolic dysfunction.   Left upper lobe noted, consider follow up with CT     -Dilated left ventricle with severely reduced systolic function with a calculated ejection fraction of 34% by Dubon's method.  -Abnormal LV global longitudinal strain (GLS) -13%  -Native myocardial T1 time is normal.  -Normal right ventricular size and systolic function with a calculated ejection fraction of 57% by Dubon's method.  -No myocardial edema by T2w imaging/mapping. (Normal myocardium/skeletal ratio - normal < 1.9).  -No intracardiac shunt. Calculated Qp:Qs:1.09 (Phase contrast velocity encoding Ao/Pa)  -Normal myocardial resting perfusion imaging.  -On delayed enhancement imaging, there is nonspecific mid myocardial basal septal enhancement, otherwise there is no evidence of prior infarction.     3. [facility] no significant disease    The MCOT, echocardiogram, stress test, and coronary angiography/PCI were reviewed by myself and used for my plan of care.       Thank you for allowing me to participate in the care of Corby Betnon. All questions and concerns were addressed to the patient/family. Alternatives to my treatment were discussed.     I, Patricia Gupta RN, am scribing for and in the presence of Dr. Emilie Diaz. 03/18/25 9:21 AM  ELSIEO Villanueva Uma Lakshmanadoss MD, personally performed the services prescribed in this documentation as scribed by Ms. Patricia Gupta RN in my presence and it is both accurate and complete.     Berta Diaz MD  Cardiac Electrophysiology  [facility]

## 2025-03-18 ENCOUNTER — OFFICE VISIT (OUTPATIENT)
Dept: CARDIOLOGY CLINIC | Age: 75
End: 2025-03-18
Payer: MEDICARE

## 2025-03-18 VITALS
DIASTOLIC BLOOD PRESSURE: 74 MMHG | BODY MASS INDEX: 27.3 KG/M2 | HEART RATE: 87 BPM | SYSTOLIC BLOOD PRESSURE: 108 MMHG | WEIGHT: 218.4 LBS

## 2025-03-18 DIAGNOSIS — I50.20 HFREF (HEART FAILURE WITH REDUCED EJECTION FRACTION) (HCC): ICD-10-CM

## 2025-03-18 DIAGNOSIS — E78.2 MIXED HYPERLIPIDEMIA: ICD-10-CM

## 2025-03-18 DIAGNOSIS — I42.8 NONISCHEMIC CARDIOMYOPATHY (HCC): Primary | ICD-10-CM

## 2025-03-18 DIAGNOSIS — I77.819 AORTIC DILATATION: ICD-10-CM

## 2025-03-18 PROCEDURE — G8417 CALC BMI ABV UP PARAM F/U: HCPCS | Performed by: INTERNAL MEDICINE

## 2025-03-18 PROCEDURE — 1123F ACP DISCUSS/DSCN MKR DOCD: CPT | Performed by: INTERNAL MEDICINE

## 2025-03-18 PROCEDURE — 1036F TOBACCO NON-USER: CPT | Performed by: INTERNAL MEDICINE

## 2025-03-18 PROCEDURE — 3017F COLORECTAL CA SCREEN DOC REV: CPT | Performed by: INTERNAL MEDICINE

## 2025-03-18 PROCEDURE — 99205 OFFICE O/P NEW HI 60 MIN: CPT | Performed by: INTERNAL MEDICINE

## 2025-03-18 PROCEDURE — G8427 DOCREV CUR MEDS BY ELIG CLIN: HCPCS | Performed by: INTERNAL MEDICINE

## 2025-03-18 PROCEDURE — 93000 ELECTROCARDIOGRAM COMPLETE: CPT | Performed by: INTERNAL MEDICINE

## 2025-03-18 PROCEDURE — 1159F MED LIST DOCD IN RCRD: CPT | Performed by: INTERNAL MEDICINE

## 2025-03-18 NOTE — PATIENT INSTRUCTIONS
Implantation of Internal Cardioverter Defibrillator    Date of Procedure:     Time of Arrival:     Cardiologist performing procedure: Dr. Lopez    Arrive at [facility] through the main entrance.  Check in at the Outpatient Diagnostic desk on the 1st floor.    Do not eat or drink anything after midnight the night before the procedure. You may brush your teeth and rinse the morning of the procedure.    Hold Jardiance for 3 days prior to procedure.    Hold Entresto for 2 days prior to procedure.    Take all your other routine medications the morning of the procedure with the following exceptions:  If you are taking diabetic medications, please HOLD on the day of the procedure (including insulin).   If you take a water pill, please HOLD on the day of the procedure.    Do NOT stop taking aspirin.    Lab work is due within a week of the procedure.  You do not need to be fasting for these labs. This can be done at the [facility] Outpatient lab at [address]    Please use Hibiclens soap (or any antibacterial soap such as Dial) to wash neck, chest, and abdomen the night before (or the morning of) the procedure.      Do not apply any lotion, powder, or deodorant after you shower and the morning of the procedure.    Please bring a list of your medications to the hospital with you.    You must have someone available to drive you home the same (or next) day. We recommend no driving until seen at the1 week visit.    If you are discharged the same day, you will need someone to stay with you at home the night of the procedure.    If you are unable to make this appointment, please call [facility], at [phone].

## 2025-05-08 RX ORDER — METOPROLOL SUCCINATE 25 MG/1
25 TABLET, EXTENDED RELEASE ORAL DAILY
Qty: 90 TABLET | Refills: 3 | Status: SHIPPED | OUTPATIENT
Start: 2025-05-08

## 2025-05-08 RX ORDER — SPIRONOLACTONE 25 MG/1
12.5 TABLET ORAL DAILY
Qty: 45 TABLET | Refills: 3 | Status: SHIPPED | OUTPATIENT
Start: 2025-05-08

## 2025-05-08 NOTE — TELEPHONE ENCOUNTER
I Medication Refills:    Medication  spironolactone (ALDACTONE) 25 MG tablet [7437]  spironolactone (ALDACTONE) 25 MG tablet [[id number]]    Order Details    Dose: 12.5 mg Route: Oral Frequency: DAILY   Dispense Quantity: 30 tablet Refills: 5          Sig: Take 0.5 tablets by mouth daily           Medication  metoprolol succinate (TOPROL XL) 25 MG extended release tablet [[id number]]  metoprolol succinate (TOPROL XL) 25 MG extended release tablet [[id number]]    Order Details  Dose: 25 mg Route: Oral Frequency: DAILY   Dispense Quantity: 90 tablet Refills: 3          Sig: Take 1 tablet by mouth daily       Pharmacy    [facility] - [facility] [address] - P [phone] - F [phone]  [address] [facility] [address]  Phone: [phone]  Fax: [phone]       Last Office Visit: 03/18/25    Next Office Visit: 08/26/25    Last Refill: 06/26/24    Last Labs: 12/27/24

## 2025-05-28 DIAGNOSIS — E11.9 TYPE 2 DIABETES MELLITUS WITHOUT COMPLICATION, WITHOUT LONG-TERM CURRENT USE OF INSULIN (HCC): ICD-10-CM

## 2025-05-28 RX ORDER — METFORMIN HYDROCHLORIDE 500 MG/1
500 TABLET, EXTENDED RELEASE ORAL NIGHTLY
Qty: 90 TABLET | Refills: 3 | Status: SHIPPED | OUTPATIENT
Start: 2025-05-28

## 2025-05-28 NOTE — TELEPHONE ENCOUNTER
Medication:   Requested Prescriptions     Pending Prescriptions Disp Refills    metFORMIN (GLUCOPHAGE-XR) 500 MG extended release tablet [Pharmacy Med Name: metFORMIN HCl ER Oral Tablet Extended Release 24 Hour 500 MG] 90 tablet 3     Sig: TAKE 1 TABLET EVERY NIGHT       Last Filled:  12.23.2024 #90 W/ 1 REFILL     Patient Phone Number: [phone] (home)     Last appt: 12/23/2024   Next appt: 6/23/2025    Last Labs DM:   Lab Results   Component Value Date/Time    LABA1C 5.9 12/23/2024 10:10 AM

## 2025-06-12 LAB
ALBUMIN URINE, EXTERNAL: NORMAL
CREATININE, URINE, EXTERNAL: NORMAL
MICROALBUMIN/CREAT UR: 10 MG/G{CREAT}

## 2025-06-13 ENCOUNTER — TELEPHONE (OUTPATIENT)
Dept: PRIMARY CARE CLINIC | Age: 75
End: 2025-06-13

## 2025-06-13 DIAGNOSIS — N39.41 URGE INCONTINENCE OF URINE: Primary | ICD-10-CM

## 2025-06-13 NOTE — TELEPHONE ENCOUNTER
The [facility]rology group is requiring a referral for pt to be seen.  Pt has been leaking urine since prostate surgery and the leakage has increased.      Can send referral to [facility] in [place].      Pt has apt Monday 06/16/25 @ 2:00    Fax: [phone]

## 2025-06-20 SDOH — ECONOMIC STABILITY: FOOD INSECURITY: WITHIN THE PAST 12 MONTHS, THE FOOD YOU BOUGHT JUST DIDN'T LAST AND YOU DIDN'T HAVE MONEY TO GET MORE.: NEVER TRUE

## 2025-06-20 SDOH — ECONOMIC STABILITY: INCOME INSECURITY: IN THE LAST 12 MONTHS, WAS THERE A TIME WHEN YOU WERE NOT ABLE TO PAY THE MORTGAGE OR RENT ON TIME?: YES

## 2025-06-20 SDOH — ECONOMIC STABILITY: FOOD INSECURITY: WITHIN THE PAST 12 MONTHS, YOU WORRIED THAT YOUR FOOD WOULD RUN OUT BEFORE YOU GOT MONEY TO BUY MORE.: NEVER TRUE

## 2025-06-20 ASSESSMENT — PATIENT HEALTH QUESTIONNAIRE - PHQ9
2. FEELING DOWN, DEPRESSED OR HOPELESS: NOT AT ALL
1. LITTLE INTEREST OR PLEASURE IN DOING THINGS: NOT AT ALL
SUM OF ALL RESPONSES TO PHQ QUESTIONS 1-9: 0
1. LITTLE INTEREST OR PLEASURE IN DOING THINGS: NOT AT ALL
SUM OF ALL RESPONSES TO PHQ QUESTIONS 1-9: 0
SUM OF ALL RESPONSES TO PHQ9 QUESTIONS 1 & 2: 0
2. FEELING DOWN, DEPRESSED OR HOPELESS: NOT AT ALL

## 2025-06-23 ENCOUNTER — OFFICE VISIT (OUTPATIENT)
Dept: PRIMARY CARE CLINIC | Age: 75
End: 2025-06-23
Payer: MEDICARE

## 2025-06-23 VITALS
RESPIRATION RATE: 14 BRPM | OXYGEN SATURATION: 98 % | SYSTOLIC BLOOD PRESSURE: 102 MMHG | DIASTOLIC BLOOD PRESSURE: 58 MMHG | BODY MASS INDEX: 27.6 KG/M2 | HEIGHT: 75 IN | HEART RATE: 82 BPM | WEIGHT: 222 LBS | TEMPERATURE: 97 F

## 2025-06-23 DIAGNOSIS — E11.9 TYPE 2 DIABETES MELLITUS WITHOUT COMPLICATION, WITHOUT LONG-TERM CURRENT USE OF INSULIN (HCC): Primary | ICD-10-CM

## 2025-06-23 DIAGNOSIS — M05.79 SEROPOSITIVE RHEUMATOID ARTHRITIS OF MULTIPLE JOINTS (HCC): ICD-10-CM

## 2025-06-23 DIAGNOSIS — K59.04 CHRONIC IDIOPATHIC CONSTIPATION: ICD-10-CM

## 2025-06-23 DIAGNOSIS — M10.00 IDIOPATHIC GOUT, UNSPECIFIED CHRONICITY, UNSPECIFIED SITE: ICD-10-CM

## 2025-06-23 LAB — HBA1C MFR BLD: 5.5 %

## 2025-06-23 PROCEDURE — 1123F ACP DISCUSS/DSCN MKR DOCD: CPT | Performed by: NURSE PRACTITIONER

## 2025-06-23 PROCEDURE — 1036F TOBACCO NON-USER: CPT | Performed by: NURSE PRACTITIONER

## 2025-06-23 PROCEDURE — 99214 OFFICE O/P EST MOD 30 MIN: CPT | Performed by: NURSE PRACTITIONER

## 2025-06-23 PROCEDURE — 1159F MED LIST DOCD IN RCRD: CPT | Performed by: NURSE PRACTITIONER

## 2025-06-23 PROCEDURE — G8417 CALC BMI ABV UP PARAM F/U: HCPCS | Performed by: NURSE PRACTITIONER

## 2025-06-23 PROCEDURE — 1160F RVW MEDS BY RX/DR IN RCRD: CPT | Performed by: NURSE PRACTITIONER

## 2025-06-23 PROCEDURE — 2022F DILAT RTA XM EVC RTNOPTHY: CPT | Performed by: NURSE PRACTITIONER

## 2025-06-23 PROCEDURE — 83036 HEMOGLOBIN GLYCOSYLATED A1C: CPT | Performed by: NURSE PRACTITIONER

## 2025-06-23 PROCEDURE — 3044F HG A1C LEVEL LT 7.0%: CPT | Performed by: NURSE PRACTITIONER

## 2025-06-23 PROCEDURE — 3017F COLORECTAL CA SCREEN DOC REV: CPT | Performed by: NURSE PRACTITIONER

## 2025-06-23 PROCEDURE — G2211 COMPLEX E/M VISIT ADD ON: HCPCS | Performed by: NURSE PRACTITIONER

## 2025-06-23 PROCEDURE — G8427 DOCREV CUR MEDS BY ELIG CLIN: HCPCS | Performed by: NURSE PRACTITIONER

## 2025-06-23 ASSESSMENT — ENCOUNTER SYMPTOMS
WHEEZING: 0
COUGH: 0
SHORTNESS OF BREATH: 0

## 2025-06-23 NOTE — PROGRESS NOTES
Corby Benton (:  1950) is a 74 y.o. male,Established patient, here for evaluation of the following chief complaint(s):  Diabetes      ASSESSMENT/PLAN:  1. Type 2 diabetes mellitus without complication, without long-term current use of insulin (HCC)  -     POCT glycosylated hemoglobin (Hb A1C)  -     Basic Metabolic Panel; Future  -     Uric Acid; Future  2. Idiopathic gout, unspecified chronicity, unspecified site  3. Chronic idiopathic constipation  4. Seropositive rheumatoid arthritis of multiple joints (HCC)      Assessment & Plan  1. Diabetes Mellitus.  - A1c level has improved from 5.9 to 5.5, indicating well-controlled blood glucose levels.  - Currently taking metformin and Jardiance; no adverse effects reported.  - Advised to monitor blood glucose levels if experiencing symptoms such as dizziness, lightheadedness, or excessive thirst.  - Ensure adequate hydration, especially during the summer months.    2. Gout.  - Currently on allopurinol 300 mg.  - Uric acid level was slightly low during the last check.  - Re-evaluation of uric acid level will be conducted today.  - No reported gout pain.    3. Constipation.  - Reports occasional constipation, attributed to long-standing occupation as a .  - Not related to current water intake.  - No specific treatment changes discussed.    4. 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 Health Maintenance.  - Scheduled appointment with cardiologist in 2025; advised to arrange a CT scan without contrast.  - Blood work will be conducted today to assess kidney function.  - Follow-up in 6 months for annual wellness visit.    Return in about 6 months (around 2025) for awv.    SUBJECTIVE/OBJECTIVE:    History of Present Illness  The patient is a 74-year-old male who presents for a diabetes follow-up.    He reports no adverse reactions to his diabetes medications, including metformin, which has not induced

## 2025-06-24 DIAGNOSIS — E11.9 TYPE 2 DIABETES MELLITUS WITHOUT COMPLICATION, WITHOUT LONG-TERM CURRENT USE OF INSULIN (HCC): ICD-10-CM

## 2025-06-25 LAB
ANION GAP SERPL CALCULATED.3IONS-SCNC: 12 MMOL/L (ref 3–16)
BUN SERPL-MCNC: 15 MG/DL (ref 7–20)
CALCIUM SERPL-MCNC: 8.9 MG/DL (ref 8.3–10.6)
CHLORIDE SERPL-SCNC: 104 MMOL/L (ref 99–110)
CO2 SERPL-SCNC: 23 MMOL/L (ref 21–32)
CREAT SERPL-MCNC: 1.1 MG/DL (ref 0.8–1.3)
GFR SERPLBLD CREATININE-BSD FMLA CKD-EPI: 70 ML/MIN/{1.73_M2}
GLUCOSE SERPL-MCNC: 100 MG/DL (ref 70–99)
POTASSIUM SERPL-SCNC: 4.3 MMOL/L (ref 3.5–5.1)
SODIUM SERPL-SCNC: 139 MMOL/L (ref 136–145)
URATE SERPL-MCNC: 3.7 MG/DL (ref 3.5–7.2)

## 2025-06-26 ENCOUNTER — RESULTS FOLLOW-UP (OUTPATIENT)
Dept: PRIMARY CARE CLINIC | Age: 75
End: 2025-06-26

## 2025-08-26 ENCOUNTER — OFFICE VISIT (OUTPATIENT)
Dept: CARDIOLOGY CLINIC | Age: 75
End: 2025-08-26
Payer: MEDICARE

## 2025-08-26 VITALS
SYSTOLIC BLOOD PRESSURE: 96 MMHG | HEART RATE: 84 BPM | DIASTOLIC BLOOD PRESSURE: 50 MMHG | BODY MASS INDEX: 26.82 KG/M2 | OXYGEN SATURATION: 92 % | WEIGHT: 214.6 LBS

## 2025-08-26 DIAGNOSIS — E78.2 MIXED HYPERLIPIDEMIA: ICD-10-CM

## 2025-08-26 DIAGNOSIS — I77.819 AORTIC DILATATION: Primary | ICD-10-CM

## 2025-08-26 DIAGNOSIS — I42.0 DILATED CARDIOMYOPATHY (HCC): ICD-10-CM

## 2025-08-26 PROCEDURE — G8427 DOCREV CUR MEDS BY ELIG CLIN: HCPCS | Performed by: INTERNAL MEDICINE

## 2025-08-26 PROCEDURE — 1123F ACP DISCUSS/DSCN MKR DOCD: CPT | Performed by: INTERNAL MEDICINE

## 2025-08-26 PROCEDURE — G8417 CALC BMI ABV UP PARAM F/U: HCPCS | Performed by: INTERNAL MEDICINE

## 2025-08-26 PROCEDURE — 1159F MED LIST DOCD IN RCRD: CPT | Performed by: INTERNAL MEDICINE

## 2025-08-26 PROCEDURE — 99214 OFFICE O/P EST MOD 30 MIN: CPT | Performed by: INTERNAL MEDICINE

## 2025-08-26 PROCEDURE — 1036F TOBACCO NON-USER: CPT | Performed by: INTERNAL MEDICINE

## (undated) DEVICE — GLOVE ORANGE PI 7   MSG9070

## (undated) DEVICE — GOWN,SIRUS,POLYRNF,BRTHSLV,XL,30/CS: Brand: MEDLINE

## (undated) DEVICE — CATHETER URETH 24FR BLLN 30CC STD LTX 3 W TWO OPP DRNGE EYE

## (undated) DEVICE — Device: Brand: LEVEL 1

## (undated) DEVICE — CYSTO: Brand: MEDLINE INDUSTRIES, INC.

## (undated) DEVICE — SYRINGE MED 30ML STD CLR PLAS LUERLOCK TIP N CTRL DISP

## (undated) DEVICE — GLOVE ORANGE PI 7 1/2   MSG9075

## (undated) DEVICE — NEEDLE HYPO 25GA L1.5IN BLU POLYPR HUB S STL REG BVL STR

## (undated) DEVICE — NEEDLE HYPO 18GA L1.5IN THN WALL PIVOTING SHLD BVL ORIENTED

## (undated) DEVICE — Device

## (undated) DEVICE — BASIC SINGLE BASIN 1-LF: Brand: MEDLINE INDUSTRIES, INC.

## (undated) DEVICE — UNDERGLOVE SURG SZ 8 FNGR THK0.21MIL GRN LTX BEAD CUF

## (undated) DEVICE — SYRINGE MED 10ML LUERLOCK TIP W/O SFTY DISP

## (undated) DEVICE — BANDAGE COMPR W4INXL12FT E DISP ESMARCH EVEN

## (undated) DEVICE — GARMENT,MEDLINE,DVT,INT,CALF,MED, GEN2: Brand: MEDLINE

## (undated) DEVICE — HF-RESECTION ELECTRODE PLASMALOOP LOOP, LARGE, 24 FR., 12°/16°, ESG TURIS: Brand: OLYMPUS

## (undated) DEVICE — ZIMMER® STERILE DISPOSABLE TOURNIQUET CUFF WITH PLC, DUAL PORT, SINGLE BLADDER, 18 IN. (46 CM)

## (undated) DEVICE — SOLUTION ST WATER 1500ML W/H

## (undated) DEVICE — TOWEL,STOP FLAG GOLD N-W: Brand: MEDLINE

## (undated) DEVICE — BANDAGE COMPR W2INXL5YD TAN BRTH SELF ADH WRP W/ HND TEAR

## (undated) DEVICE — SOLUTION IRRIG 3000ML 0.9% SOD CHL USP UROMATIC PLAS CONT

## (undated) DEVICE — BAG DRAINAGE 2000ML UROLOGY ANTI REFLUX CHAMBER SAMPLE PORT

## (undated) DEVICE — GOWN,SIRUS,NON REINFRCD,LARGE,SET IN SL: Brand: MEDLINE

## (undated) DEVICE — SOLUTION IV IRRIG 500ML 0.9% SODIUM CHL 2F7123

## (undated) DEVICE — 60 ML SYRINGE,CATHETER TIP: Brand: MONOJECT